# Patient Record
Sex: FEMALE | Race: WHITE | NOT HISPANIC OR LATINO | Employment: FULL TIME | URBAN - METROPOLITAN AREA
[De-identification: names, ages, dates, MRNs, and addresses within clinical notes are randomized per-mention and may not be internally consistent; named-entity substitution may affect disease eponyms.]

---

## 2017-11-04 ENCOUNTER — TELEPHONE (OUTPATIENT)
Dept: MEDICAL GROUP | Facility: MEDICAL CENTER | Age: 47
End: 2017-11-04

## 2017-11-06 ENCOUNTER — OFFICE VISIT (OUTPATIENT)
Dept: MEDICAL GROUP | Facility: MEDICAL CENTER | Age: 47
End: 2017-11-06
Payer: COMMERCIAL

## 2017-11-06 VITALS
BODY MASS INDEX: 27.94 KG/M2 | TEMPERATURE: 97.7 F | WEIGHT: 148 LBS | SYSTOLIC BLOOD PRESSURE: 122 MMHG | HEIGHT: 61 IN | HEART RATE: 97 BPM | OXYGEN SATURATION: 59 % | DIASTOLIC BLOOD PRESSURE: 74 MMHG

## 2017-11-06 DIAGNOSIS — Z87.898 HISTORY OF VERTIGO: ICD-10-CM

## 2017-11-06 DIAGNOSIS — Z12.31 ENCOUNTER FOR SCREENING MAMMOGRAM FOR BREAST CANCER: ICD-10-CM

## 2017-11-06 DIAGNOSIS — Z00.00 PREVENTATIVE HEALTH CARE: Chronic | ICD-10-CM

## 2017-11-06 DIAGNOSIS — Z90.710 S/P TAH (TOTAL ABDOMINAL HYSTERECTOMY): Chronic | ICD-10-CM

## 2017-11-06 DIAGNOSIS — L65.9 HAIR THINNING: ICD-10-CM

## 2017-11-06 DIAGNOSIS — B35.9 TINEA: ICD-10-CM

## 2017-11-06 PROCEDURE — 99396 PREV VISIT EST AGE 40-64: CPT | Performed by: INTERNAL MEDICINE

## 2017-11-06 RX ORDER — CLOTRIMAZOLE AND BETAMETHASONE DIPROPIONATE 10; .64 MG/G; MG/G
1 CREAM TOPICAL 2 TIMES DAILY
Qty: 45 G | Refills: 0 | Status: SHIPPED | OUTPATIENT
Start: 2017-11-06 | End: 2022-04-29

## 2017-11-06 ASSESSMENT — PATIENT HEALTH QUESTIONNAIRE - PHQ9: CLINICAL INTERPRETATION OF PHQ2 SCORE: 0

## 2017-11-06 ASSESSMENT — ENCOUNTER SYMPTOMS
WEIGHT LOSS: 0
BACK PAIN: 0
HEADACHES: 0
PALPITATIONS: 0
SHORTNESS OF BREATH: 0
DOUBLE VISION: 0
DIZZINESS: 0
INSOMNIA: 0
DEPRESSION: 0
COUGH: 0
HEARTBURN: 0
BLURRED VISION: 0

## 2017-11-06 NOTE — PROGRESS NOTES
Subjective:      Yadira Suarez is a 47 y.o. 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 and goes out of state to travel for 3-6 months, exercising 3-4 times per week 1.5 hours at a time, improved diet and limits sweets and candies, coffee 1 per day, no soda, tea on occasion  FH sisters 3 ,mother in [place], little brother in [place], one son, father   Did get flu shot and tetanus in [place]  Medications, allergies, medical history, surgical history, social history, family history reviewed and updated      Current Outpatient Prescriptions   Medication Sig Dispense Refill   • vitamin D, Ergocalciferol, (DRISDOL) 26989 UNIT CAPS capsule Take 50,000 Units by mouth every Monday.       • Multiple Vitamin (MULTIVITAMIN PO) Take 1 Tab by mouth every day.     • Calcium Carbonate-Vitamin D (CALCIUM 600+D HIGH POTENCY PO) Take 2 Tabs by mouth every day. 2 tabs in am  1 tab in pm       No current facility-administered medications for this visit.      Patient Active Problem List   Diagnosis   • S/P ERICA (total abdominal hysterectomy)   • History of vertigo   • Microscopic hematuria   • History of hypertension   • S/P laparoscopic cholecystectomy   • Preventative health care   • Dyslipidemia             Patient Care Team:  Jak Lacey M.D. as PCP - General    Depression Screening    Little interest or pleasure in doing things?  0 - not at all  Feeling down, depressed , or hopeless? 0 - not at all  Patient Health Questionnaire Score: 0            Health Maintenance Summary                IMM DTaP/Tdap/Td Vaccine Overdue 1990     PAP SMEAR Overdue 1992     MAMMOGRAM Overdue 2015      Done 2014 MA-SCREENING MAMMOGRAM W/ CAD      "Patient has more history with this topic...    IMM INFLUENZA Overdue 9/1/2017      Done 9/30/2014 Imm Admin: Influenza TIV (IM)     Patient has more history with this topic...        Status post ERICA  5/06 hysteroscopy and diagnostic laparoscopy  11/06 laparoscopic hysterectomy  1/11 BSO and lysis of adhesions  On bioidentical hormones estrogen, progesterone and testosterone cream per   11/13 on combipatch  12/26/14 on estrogen patch per gyn  gyn    Status post cholecystectomy    Preventative health  12/9/14 mammogram  12/27/14 vit d 30 on calcium 1000 mg daily, vit d 600 units daily and 5000 units MWF    Microscopic hematuria  1999 dr nino had negative IVP    History of vertigo    History hypertension  2/11 chol 184,trig 57,hdl 61,ldl 112  4/21/11 lisinopril 10 mg  7/11 off lisinopril    Dyslipidemia  12/27/14 chol 197,trig 95,hdl 65,ldl 113                Patient Care Team:  Jak Lacey M.D. as PCP - General    Review of Systems   Constitutional: Negative for weight loss.   Eyes: Negative for blurred vision and double vision.   Respiratory: Negative for cough and shortness of breath.    Cardiovascular: Negative for chest pain and palpitations.   Gastrointestinal: Negative for heartburn.   Genitourinary: Negative for frequency.   Musculoskeletal: Negative for back pain.   Neurological: Negative for dizziness and headaches.   Psychiatric/Behavioral: Negative for depression. The patient does not have insomnia.      Hair thinning no bald patches     Objective:     /74   Pulse 97   Temp 36.5 °C (97.7 °F)   Ht 1.549 m (5' 1\")   Wt 67.1 kg (148 lb)   SpO2 (!) 59%   BMI 27.96 kg/m²      Physical Exam   Constitutional: She appears well-developed and well-nourished. No distress.   HENT:   Head: Normocephalic and atraumatic.   Mouth/Throat: Oropharynx is clear and moist.   Eyes: Conjunctivae are normal. Right eye exhibits no discharge. Left eye exhibits no discharge.   Neck: Neck " supple. No JVD present.   Cardiovascular: Normal rate and regular rhythm.    Pulmonary/Chest: Effort normal and breath sounds normal. No respiratory distress. She has no wheezes.   Abdominal: She exhibits no distension.   Musculoskeletal: She exhibits no edema.   Neurological: She is alert.   Skin: Skin is warm. She is not diaphoretic.   Psychiatric: She has a normal mood and affect. Her behavior is normal.   Nursing note and vitals reviewed.    Right elbow rash consistent with tinea, area posterior occipital scalp mild tinea             Assessment/Plan:     Assessment  #! Annual exam    #2 History hypertension off lisinopril, has lost weight, working on diet and exercise, no medications    #3 Dyslipidemia most recent lipid panel 12/27/14 chol 197,trig 95,hdl 65,ldl 113    #4 Status post ERICA 11/06 laparoscopic hysterectomy, no HRT     #5 Status post cholecystectomy, asymptomatic    #6 Tinea right elbow and posterior occipital scalp region    #7 Preventative health  12/9/14 mammogram  12/27/14 vit d 30 on calcium 1000 mg daily, vit d 600 units daily and 5000 units MWF    #8 hair thinning    Plan  #! 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

## 2017-11-13 LAB
25(OH)D3+25(OH)D2 SERPL-MCNC: 87.6 NG/ML (ref 30–100)
ALBUMIN SERPL-MCNC: 4.3 G/DL (ref 3.5–5.5)
ALBUMIN/GLOB SERPL: 1.6 {RATIO} (ref 1.2–2.2)
ALP SERPL-CCNC: 120 IU/L (ref 39–117)
ALT SERPL-CCNC: 19 IU/L (ref 0–32)
APPEARANCE UR: CLEAR
AST SERPL-CCNC: 19 IU/L (ref 0–40)
BACTERIA #/AREA URNS HPF: ABNORMAL /[HPF]
BASOPHILS # BLD AUTO: 0 X10E3/UL (ref 0–0.2)
BASOPHILS NFR BLD AUTO: 1 %
BILIRUB SERPL-MCNC: 1.1 MG/DL (ref 0–1.2)
BILIRUB UR QL STRIP: NEGATIVE
BIOTIN SERPL-MCNC: 12.68 NG/ML (ref 0.05–0.83)
BUN SERPL-MCNC: 19 MG/DL (ref 6–24)
BUN/CREAT SERPL: 25 (ref 9–23)
CALCIUM SERPL-MCNC: 10.4 MG/DL (ref 8.7–10.2)
CASTS URNS MICRO: ABNORMAL
CASTS URNS QL MICRO: ABNORMAL
CHLORIDE SERPL-SCNC: 104 MMOL/L (ref 96–106)
CHOLEST SERPL-MCNC: 194 MG/DL (ref 100–199)
CO2 SERPL-SCNC: 23 MMOL/L (ref 18–29)
COLOR UR: YELLOW
COMMENT 011824: ABNORMAL
CREAT SERPL-MCNC: 0.75 MG/DL (ref 0.57–1)
CRYSTALS URNS MICRO: ABNORMAL
EOSINOPHIL # BLD AUTO: 0.1 X10E3/UL (ref 0–0.4)
EOSINOPHIL NFR BLD AUTO: 2 %
EPI CELLS #/AREA URNS HPF: ABNORMAL /HPF
ERYTHROCYTE [DISTWIDTH] IN BLOOD BY AUTOMATED COUNT: 13.4 % (ref 12.3–15.4)
GFR SERPLBLD CREATININE-BSD FMLA CKD-EPI: 111 ML/MIN/1.73
GFR SERPLBLD CREATININE-BSD FMLA CKD-EPI: 96 ML/MIN/1.73
GLOBULIN SER CALC-MCNC: 2.7 G/DL (ref 1.5–4.5)
GLUCOSE SERPL-MCNC: 92 MG/DL (ref 65–99)
GLUCOSE UR QL: NEGATIVE
HBA1C MFR BLD: 5.5 % (ref 4.8–5.6)
HCT VFR BLD AUTO: 41.7 % (ref 34–46.6)
HDLC SERPL-MCNC: 76 MG/DL
HGB BLD-MCNC: 14.2 G/DL (ref 11.1–15.9)
HGB UR QL STRIP: NEGATIVE
IMM GRANULOCYTES # BLD: 0 X10E3/UL (ref 0–0.1)
IMM GRANULOCYTES NFR BLD: 0 %
IMMATURE CELLS  115398: NORMAL
KETONES UR QL STRIP: NEGATIVE
LDLC SERPL CALC-MCNC: 102 MG/DL (ref 0–99)
LEUKOCYTE ESTERASE UR QL STRIP: NEGATIVE
LYMPHOCYTES # BLD AUTO: 2.6 X10E3/UL (ref 0.7–3.1)
LYMPHOCYTES NFR BLD AUTO: 50 %
MCH RBC QN AUTO: 31.8 PG (ref 26.6–33)
MCHC RBC AUTO-ENTMCNC: 34.1 G/DL (ref 31.5–35.7)
MCV RBC AUTO: 94 FL (ref 79–97)
MICRO URNS: NORMAL
MICRO URNS: NORMAL
MONOCYTES # BLD AUTO: 0.3 X10E3/UL (ref 0.1–0.9)
MONOCYTES NFR BLD AUTO: 6 %
MORPHOLOGY BLD-IMP: NORMAL
MUCOUS THREADS URNS QL MICRO: PRESENT
NEUTROPHILS # BLD AUTO: 2.1 X10E3/UL (ref 1.4–7)
NEUTROPHILS NFR BLD AUTO: 41 %
NITRITE UR QL STRIP: NEGATIVE
NRBC BLD AUTO-RTO: NORMAL %
PH UR STRIP: 6.5 [PH] (ref 5–7.5)
PLATELET # BLD AUTO: 197 X10E3/UL (ref 150–379)
POTASSIUM SERPL-SCNC: 4 MMOL/L (ref 3.5–5.2)
PROT SERPL-MCNC: 7 G/DL (ref 6–8.5)
PROT UR QL STRIP: NEGATIVE
RBC # BLD AUTO: 4.46 X10E6/UL (ref 3.77–5.28)
RBC #/AREA URNS HPF: ABNORMAL /HPF
RENAL EPI CELLS #/AREA URNS HPF: ABNORMAL /[HPF]
SODIUM SERPL-SCNC: 143 MMOL/L (ref 134–144)
SP GR UR: 1.02 (ref 1–1.03)
T VAGINALIS URNS QL MICRO: ABNORMAL
TRIGL SERPL-MCNC: 81 MG/DL (ref 0–149)
TSH SERPL DL<=0.005 MIU/L-ACNC: 2.75 UIU/ML (ref 0.45–4.5)
UNIDENT CRYS URNS QL MICRO: ABNORMAL
URINALYSIS REFLEX  377202: NORMAL
URNS CMNT MICRO: ABNORMAL
UROBILINOGEN UR STRIP-MCNC: 0.2 MG/DL (ref 0.2–1)
VIT B12 SERPL-MCNC: >2000 PG/ML (ref 211–946)
VLDLC SERPL CALC-MCNC: 16 MG/DL (ref 5–40)
WBC # BLD AUTO: 5.1 X10E3/UL (ref 3.4–10.8)
WBC #/AREA URNS HPF: ABNORMAL /HPF
YEAST #/AREA URNS HPF: ABNORMAL /[HPF]

## 2017-11-15 ENCOUNTER — TELEPHONE (OUTPATIENT)
Dept: MEDICAL GROUP | Facility: MEDICAL CENTER | Age: 47
End: 2017-11-15

## 2017-11-15 DIAGNOSIS — R31.29 MICROSCOPIC HEMATURIA: ICD-10-CM

## 2017-11-16 NOTE — TELEPHONE ENCOUNTER
Notified with results.  Calcium and alkaline phosphatase minimally elevated, will monitor  Microscopic blood in urine, has had previous workup by urology records, will repeat urinalysis  Decrease vitamin D to every other week

## 2017-11-23 ENCOUNTER — TELEPHONE (OUTPATIENT)
Dept: MEDICAL GROUP | Facility: MEDICAL CENTER | Age: 47
End: 2017-11-23

## 2017-11-23 LAB
APPEARANCE UR: CLEAR
BACTERIA #/AREA URNS HPF: ABNORMAL /[HPF]
BILIRUB UR QL STRIP: NEGATIVE
CASTS URNS MICRO: ABNORMAL
CASTS URNS QL MICRO: PRESENT /LPF
COLOR UR: YELLOW
CRYSTALS URNS MICRO: ABNORMAL
EPI CELLS #/AREA URNS HPF: ABNORMAL /HPF
GLUCOSE UR QL: NEGATIVE
HGB UR QL STRIP: NEGATIVE
KETONES UR QL STRIP: NEGATIVE
LEUKOCYTE ESTERASE UR QL STRIP: NEGATIVE
MICRO URNS: NORMAL
MICRO URNS: NORMAL
MUCOUS THREADS URNS QL MICRO: PRESENT
NITRITE UR QL STRIP: NEGATIVE
PH UR STRIP: 6.5 [PH] (ref 5–7.5)
PROT UR QL STRIP: NEGATIVE
RBC #/AREA URNS HPF: ABNORMAL /HPF
RENAL EPI CELLS #/AREA URNS HPF: ABNORMAL /[HPF]
SP GR UR: 1.03 (ref 1–1.03)
T VAGINALIS URNS QL MICRO: ABNORMAL
UNIDENT CRYS URNS QL MICRO: ABNORMAL
URINALYSIS REFLEX  377202: NORMAL
URNS CMNT MICRO: ABNORMAL
UROBILINOGEN UR STRIP-MCNC: 0.2 MG/DL (ref 0.2–1)
WBC #/AREA URNS HPF: ABNORMAL /HPF
YEAST #/AREA URNS HPF: ABNORMAL /[HPF]

## 2017-11-27 ENCOUNTER — TELEPHONE (OUTPATIENT)
Dept: MEDICAL GROUP | Facility: MEDICAL CENTER | Age: 47
End: 2017-11-27

## 2017-11-27 NOTE — TELEPHONE ENCOUNTER
----- Message from Jak Lacey M.D. sent at 11/23/2017  1:55 PM PST -----  Please notify patient that her repeat urinalysis is negative for blood

## 2018-01-29 ENCOUNTER — TELEPHONE (OUTPATIENT)
Dept: MEDICAL GROUP | Facility: MEDICAL CENTER | Age: 48
End: 2018-01-29

## 2018-01-29 DIAGNOSIS — R20.2 PARESTHESIAS: ICD-10-CM

## 2018-01-29 NOTE — TELEPHONE ENCOUNTER
I will order the MRI, but she should follow-up with a neurologist, I will place that referral    She should wait until the insurance approves the MRI before having the test done

## 2018-02-20 ENCOUNTER — HOSPITAL ENCOUNTER (OUTPATIENT)
Dept: RADIOLOGY | Facility: MEDICAL CENTER | Age: 48
End: 2018-02-20
Attending: INTERNAL MEDICINE
Payer: COMMERCIAL

## 2018-02-20 DIAGNOSIS — R20.2 PARESTHESIAS: ICD-10-CM

## 2018-02-20 PROCEDURE — 70551 MRI BRAIN STEM W/O DYE: CPT

## 2018-03-01 ENCOUNTER — TELEPHONE (OUTPATIENT)
Dept: MEDICAL GROUP | Facility: MEDICAL CENTER | Age: 48
End: 2018-03-01

## 2018-03-01 NOTE — TELEPHONE ENCOUNTER
1. Caller Name: Kamila                      Call Back Number: [phone] (home)       2. Message: Pt called and LM. Wants to cancel 3/5 appt and r/s for 3/12 or 3/26. Neither were available. LM for pt to call central scheduling.    3. Patient approves office to leave a detailed voicemail/MyChart message: no

## 2018-05-22 ENCOUNTER — APPOINTMENT (OUTPATIENT)
Dept: NEUROLOGY | Facility: MEDICAL CENTER | Age: 48
End: 2018-05-22
Payer: COMMERCIAL

## 2018-10-09 PROBLEM — G51.39 HEMIFACIAL SPASM: Status: ACTIVE | Noted: 2018-10-09

## 2019-01-23 PROBLEM — E21.3 HYPERPARATHYROIDISM (HCC): Status: ACTIVE | Noted: 2019-01-23

## 2021-02-02 ENCOUNTER — HOSPITAL ENCOUNTER (OUTPATIENT)
Facility: MEDICAL CENTER | Age: 51
End: 2021-02-02
Attending: NURSE PRACTITIONER
Payer: COMMERCIAL

## 2021-02-02 ENCOUNTER — EH NON-PROVIDER (OUTPATIENT)
Dept: OCCUPATIONAL MEDICINE | Facility: CLINIC | Age: 51
End: 2021-02-02

## 2021-02-02 ENCOUNTER — EMPLOYEE HEALTH (OUTPATIENT)
Dept: OCCUPATIONAL MEDICINE | Facility: CLINIC | Age: 51
End: 2021-02-02

## 2021-02-02 DIAGNOSIS — Z02.1 PRE-EMPLOYMENT HEALTH SCREENING EXAMINATION: ICD-10-CM

## 2021-02-02 DIAGNOSIS — Z02.1 PRE-EMPLOYMENT DRUG SCREENING: Primary | ICD-10-CM

## 2021-02-02 DIAGNOSIS — Z02.1 PRE-EMPLOYMENT DRUG SCREENING: ICD-10-CM

## 2021-02-02 LAB
AMP AMPHETAMINE: NORMAL
BAR BARBITURATES: NORMAL
BZO BENZODIAZEPINES: NORMAL
COC COCAINE: NORMAL
INT CON NEG: NORMAL
INT CON POS: NORMAL
MDMA ECSTASY: NORMAL
MET METHAMPHETAMINES: NORMAL
MTD METHADONE: NORMAL
OPI OPIATES: NORMAL
OXY OXYCODONE: NORMAL
PCP PHENCYCLIDINE: NORMAL
POC URINE DRUG SCREEN OCDRS: NEGATIVE
THC: NORMAL

## 2021-02-02 PROCEDURE — 80305 DRUG TEST PRSMV DIR OPT OBS: CPT | Performed by: NURSE PRACTITIONER

## 2021-02-02 PROCEDURE — 86480 TB TEST CELL IMMUN MEASURE: CPT | Performed by: NURSE PRACTITIONER

## 2021-02-02 PROCEDURE — 8915 PR COMPREHENSIVE PHYSICAL: Performed by: NURSE PRACTITIONER

## 2021-02-02 PROCEDURE — 90715 TDAP VACCINE 7 YRS/> IM: CPT | Performed by: NURSE PRACTITIONER

## 2021-02-02 PROCEDURE — 86787 VARICELLA-ZOSTER ANTIBODY: CPT | Performed by: NURSE PRACTITIONER

## 2021-02-02 PROCEDURE — 94375 RESPIRATORY FLOW VOLUME LOOP: CPT | Performed by: NURSE PRACTITIONER

## 2021-02-02 PROCEDURE — 90746 HEPB VACCINE 3 DOSE ADULT IM: CPT | Performed by: NURSE PRACTITIONER

## 2021-02-03 LAB — VZV IGG SER IA-ACNC: 2.47

## 2021-02-04 LAB
GAMMA INTERFERON BACKGROUND BLD IA-ACNC: 0.05 IU/ML
M TB IFN-G BLD-IMP: NEGATIVE
M TB IFN-G CD4+ BCKGRND COR BLD-ACNC: 0.03 IU/ML
MITOGEN IGNF BCKGRD COR BLD-ACNC: >10 IU/ML
QFT TB2 - NIL TBQ2: 0.02 IU/ML

## 2021-02-08 ENCOUNTER — EH NON-PROVIDER (OUTPATIENT)
Dept: OCCUPATIONAL MEDICINE | Facility: CLINIC | Age: 51
End: 2021-02-08

## 2021-02-08 DIAGNOSIS — Z71.85 IMMUNIZATION COUNSELING: ICD-10-CM

## 2021-03-17 ENCOUNTER — EH NON-PROVIDER (OUTPATIENT)
Dept: OCCUPATIONAL MEDICINE | Facility: CLINIC | Age: 51
End: 2021-03-17

## 2021-03-17 DIAGNOSIS — Z23 NEED FOR HEPATITIS B VACCINATION: Primary | ICD-10-CM

## 2021-03-17 PROCEDURE — 90746 HEPB VACCINE 3 DOSE ADULT IM: CPT | Performed by: NURSE PRACTITIONER

## 2021-05-07 ENCOUNTER — HOSPITAL ENCOUNTER (OUTPATIENT)
Dept: LAB | Facility: MEDICAL CENTER | Age: 51
End: 2021-05-07
Attending: NURSE PRACTITIONER
Payer: COMMERCIAL

## 2021-05-07 LAB
BASOPHILS # BLD AUTO: 0.8 % (ref 0–1.8)
BASOPHILS # BLD: 0.04 K/UL (ref 0–0.12)
EOSINOPHIL # BLD AUTO: 0.09 K/UL (ref 0–0.51)
EOSINOPHIL NFR BLD: 1.9 % (ref 0–6.9)
ERYTHROCYTE [DISTWIDTH] IN BLOOD BY AUTOMATED COUNT: 41.3 FL (ref 35.9–50)
HCT VFR BLD AUTO: 45.8 % (ref 37–47)
HGB BLD-MCNC: 15.3 G/DL (ref 12–16)
IMM GRANULOCYTES # BLD AUTO: 0.02 K/UL (ref 0–0.11)
IMM GRANULOCYTES NFR BLD AUTO: 0.4 % (ref 0–0.9)
LYMPHOCYTES # BLD AUTO: 1.47 K/UL (ref 1–4.8)
LYMPHOCYTES NFR BLD: 30.3 % (ref 22–41)
MCH RBC QN AUTO: 31.4 PG (ref 27–33)
MCHC RBC AUTO-ENTMCNC: 33.4 G/DL (ref 33.6–35)
MCV RBC AUTO: 94 FL (ref 81.4–97.8)
MONOCYTES # BLD AUTO: 0.33 K/UL (ref 0–0.85)
MONOCYTES NFR BLD AUTO: 6.8 % (ref 0–13.4)
NEUTROPHILS # BLD AUTO: 2.9 K/UL (ref 2–7.15)
NEUTROPHILS NFR BLD: 59.8 % (ref 44–72)
NRBC # BLD AUTO: 0 K/UL
NRBC BLD-RTO: 0 /100 WBC
PLATELET # BLD AUTO: 214 K/UL (ref 164–446)
PMV BLD AUTO: 10.3 FL (ref 9–12.9)
RBC # BLD AUTO: 4.87 M/UL (ref 4.2–5.4)
WBC # BLD AUTO: 4.9 K/UL (ref 4.8–10.8)

## 2021-05-07 PROCEDURE — 83735 ASSAY OF MAGNESIUM: CPT

## 2021-05-07 PROCEDURE — 84443 ASSAY THYROID STIM HORMONE: CPT

## 2021-05-07 PROCEDURE — 82330 ASSAY OF CALCIUM: CPT

## 2021-05-07 PROCEDURE — 84439 ASSAY OF FREE THYROXINE: CPT

## 2021-05-07 PROCEDURE — 36415 COLL VENOUS BLD VENIPUNCTURE: CPT

## 2021-05-07 PROCEDURE — 80053 COMPREHEN METABOLIC PANEL: CPT

## 2021-05-07 PROCEDURE — 82306 VITAMIN D 25 HYDROXY: CPT

## 2021-05-07 PROCEDURE — 85025 COMPLETE CBC W/AUTO DIFF WBC: CPT

## 2021-05-07 PROCEDURE — 80061 LIPID PANEL: CPT

## 2021-05-07 PROCEDURE — 84100 ASSAY OF PHOSPHORUS: CPT

## 2021-05-07 PROCEDURE — 83970 ASSAY OF PARATHORMONE: CPT

## 2021-05-08 LAB
ALBUMIN SERPL BCP-MCNC: 4.2 G/DL (ref 3.2–4.9)
ALBUMIN/GLOB SERPL: 1.4 G/DL
ALP SERPL-CCNC: 96 U/L (ref 30–99)
ALT SERPL-CCNC: 15 U/L (ref 2–50)
ANION GAP SERPL CALC-SCNC: 8 MMOL/L (ref 7–16)
AST SERPL-CCNC: 16 U/L (ref 12–45)
BILIRUB SERPL-MCNC: 1 MG/DL (ref 0.1–1.5)
BUN SERPL-MCNC: 19 MG/DL (ref 8–22)
CA-I SERPL-SCNC: 1.2 MMOL/L (ref 1.1–1.3)
CALCIUM SERPL-MCNC: 9.7 MG/DL (ref 8.5–10.5)
CHLORIDE SERPL-SCNC: 108 MMOL/L (ref 96–112)
CHOLEST SERPL-MCNC: 212 MG/DL (ref 100–199)
CO2 SERPL-SCNC: 26 MMOL/L (ref 20–33)
CREAT SERPL-MCNC: 0.85 MG/DL (ref 0.5–1.4)
FASTING STATUS PATIENT QL REPORTED: NORMAL
GLOBULIN SER CALC-MCNC: 3.1 G/DL (ref 1.9–3.5)
GLUCOSE SERPL-MCNC: 95 MG/DL (ref 65–99)
HDLC SERPL-MCNC: 66 MG/DL
LDLC SERPL CALC-MCNC: 129 MG/DL
MAGNESIUM SERPL-MCNC: 2.3 MG/DL (ref 1.5–2.5)
PHOSPHATE SERPL-MCNC: 3.7 MG/DL (ref 2.5–4.5)
POTASSIUM SERPL-SCNC: 4.5 MMOL/L (ref 3.6–5.5)
PROT SERPL-MCNC: 7.3 G/DL (ref 6–8.2)
PTH-INTACT SERPL-MCNC: 33.7 PG/ML (ref 14–72)
SODIUM SERPL-SCNC: 142 MMOL/L (ref 135–145)
T4 FREE SERPL-MCNC: 1.07 NG/DL (ref 0.93–1.7)
TRIGL SERPL-MCNC: 83 MG/DL (ref 0–149)
TSH SERPL DL<=0.005 MIU/L-ACNC: 2.09 UIU/ML (ref 0.38–5.33)

## 2021-05-10 LAB — 25(OH)D3 SERPL-MCNC: 30 NG/ML (ref 30–80)

## 2021-08-19 ENCOUNTER — EH NON-PROVIDER (OUTPATIENT)
Dept: OCCUPATIONAL MEDICINE | Facility: CLINIC | Age: 51
End: 2021-08-19

## 2021-08-19 DIAGNOSIS — Z02.89 ENCOUNTER FOR OCCUPATIONAL HEALTH ASSESSMENT: Primary | ICD-10-CM

## 2021-08-19 PROCEDURE — 90746 HEPB VACCINE 3 DOSE ADULT IM: CPT | Performed by: NURSE PRACTITIONER

## 2021-09-01 ENCOUNTER — HOSPITAL ENCOUNTER (EMERGENCY)
Facility: MEDICAL CENTER | Age: 51
End: 2021-09-01
Attending: EMERGENCY MEDICINE
Payer: COMMERCIAL

## 2021-09-01 VITALS
DIASTOLIC BLOOD PRESSURE: 80 MMHG | HEART RATE: 67 BPM | OXYGEN SATURATION: 97 % | RESPIRATION RATE: 20 BRPM | HEIGHT: 61 IN | SYSTOLIC BLOOD PRESSURE: 133 MMHG | WEIGHT: 166 LBS | BODY MASS INDEX: 31.34 KG/M2 | TEMPERATURE: 97.8 F

## 2021-09-01 DIAGNOSIS — R42 DIZZINESS: ICD-10-CM

## 2021-09-01 DIAGNOSIS — I10 HYPERTENSION, UNSPECIFIED TYPE: ICD-10-CM

## 2021-09-01 DIAGNOSIS — R11.0 NAUSEA: ICD-10-CM

## 2021-09-01 LAB
ALBUMIN SERPL BCP-MCNC: 4.2 G/DL (ref 3.2–4.9)
ALBUMIN/GLOB SERPL: 1.4 G/DL
ALP SERPL-CCNC: 112 U/L (ref 30–99)
ALT SERPL-CCNC: 16 U/L (ref 2–50)
ANION GAP SERPL CALC-SCNC: 16 MMOL/L (ref 7–16)
APPEARANCE UR: CLEAR
AST SERPL-CCNC: 16 U/L (ref 12–45)
BACTERIA #/AREA URNS HPF: ABNORMAL /HPF
BASOPHILS # BLD AUTO: 0.7 % (ref 0–1.8)
BASOPHILS # BLD: 0.05 K/UL (ref 0–0.12)
BILIRUB SERPL-MCNC: 1 MG/DL (ref 0.1–1.5)
BILIRUB UR QL STRIP.AUTO: NEGATIVE
BUN SERPL-MCNC: 20 MG/DL (ref 8–22)
CA-I SERPL-SCNC: 1.17 MMOL/L (ref 1.1–1.3)
CALCIUM SERPL-MCNC: 9.9 MG/DL (ref 8.4–10.2)
CHLORIDE SERPL-SCNC: 106 MMOL/L (ref 96–112)
CO2 SERPL-SCNC: 24 MMOL/L (ref 20–33)
COLOR UR: YELLOW
CREAT SERPL-MCNC: 0.88 MG/DL (ref 0.5–1.4)
EKG IMPRESSION: NORMAL
EOSINOPHIL # BLD AUTO: 0.16 K/UL (ref 0–0.51)
EOSINOPHIL NFR BLD: 2.2 % (ref 0–6.9)
EPI CELLS #/AREA URNS HPF: ABNORMAL /HPF
ERYTHROCYTE [DISTWIDTH] IN BLOOD BY AUTOMATED COUNT: 41.4 FL (ref 35.9–50)
GLOBULIN SER CALC-MCNC: 3.1 G/DL (ref 1.9–3.5)
GLUCOSE SERPL-MCNC: 111 MG/DL (ref 65–99)
GLUCOSE UR STRIP.AUTO-MCNC: NEGATIVE MG/DL
HCT VFR BLD AUTO: 46 % (ref 37–47)
HGB BLD-MCNC: 15.9 G/DL (ref 12–16)
IMM GRANULOCYTES # BLD AUTO: 0.03 K/UL (ref 0–0.11)
IMM GRANULOCYTES NFR BLD AUTO: 0.4 % (ref 0–0.9)
KETONES UR STRIP.AUTO-MCNC: NEGATIVE MG/DL
LEUKOCYTE ESTERASE UR QL STRIP.AUTO: NEGATIVE
LYMPHOCYTES # BLD AUTO: 2.51 K/UL (ref 1–4.8)
LYMPHOCYTES NFR BLD: 34.5 % (ref 22–41)
MAGNESIUM SERPL-MCNC: 2.4 MG/DL (ref 1.5–2.5)
MCH RBC QN AUTO: 31.7 PG (ref 27–33)
MCHC RBC AUTO-ENTMCNC: 34.6 G/DL (ref 33.6–35)
MCV RBC AUTO: 91.8 FL (ref 81.4–97.8)
MICRO URNS: ABNORMAL
MONOCYTES # BLD AUTO: 0.51 K/UL (ref 0–0.85)
MONOCYTES NFR BLD AUTO: 7 % (ref 0–13.4)
MUCOUS THREADS #/AREA URNS HPF: ABNORMAL /HPF
NEUTROPHILS # BLD AUTO: 4.01 K/UL (ref 2–7.15)
NEUTROPHILS NFR BLD: 55.2 % (ref 44–72)
NITRITE UR QL STRIP.AUTO: NEGATIVE
NRBC # BLD AUTO: 0 K/UL
NRBC BLD-RTO: 0 /100 WBC
PH UR STRIP.AUTO: 6.5 [PH] (ref 5–8)
PHOSPHATE SERPL-MCNC: 3.9 MG/DL (ref 2.5–4.5)
PLATELET # BLD AUTO: 244 K/UL (ref 164–446)
PMV BLD AUTO: 9.6 FL (ref 9–12.9)
POTASSIUM SERPL-SCNC: 3.8 MMOL/L (ref 3.6–5.5)
PROT SERPL-MCNC: 7.3 G/DL (ref 6–8.2)
PROT UR QL STRIP: NEGATIVE MG/DL
RBC # BLD AUTO: 5.01 M/UL (ref 4.2–5.4)
RBC # URNS HPF: ABNORMAL /HPF
RBC UR QL AUTO: ABNORMAL
SODIUM SERPL-SCNC: 146 MMOL/L (ref 135–145)
SP GR UR STRIP.AUTO: 1.02
TROPONIN T SERPL-MCNC: <6 NG/L (ref 6–19)
WBC # BLD AUTO: 7.3 K/UL (ref 4.8–10.8)
WBC #/AREA URNS HPF: ABNORMAL /HPF

## 2021-09-01 PROCEDURE — 700105 HCHG RX REV CODE 258: Performed by: EMERGENCY MEDICINE

## 2021-09-01 PROCEDURE — 700102 HCHG RX REV CODE 250 W/ 637 OVERRIDE(OP): Performed by: EMERGENCY MEDICINE

## 2021-09-01 PROCEDURE — 96374 THER/PROPH/DIAG INJ IV PUSH: CPT

## 2021-09-01 PROCEDURE — 93005 ELECTROCARDIOGRAM TRACING: CPT

## 2021-09-01 PROCEDURE — 84100 ASSAY OF PHOSPHORUS: CPT

## 2021-09-01 PROCEDURE — 700111 HCHG RX REV CODE 636 W/ 250 OVERRIDE (IP): Performed by: EMERGENCY MEDICINE

## 2021-09-01 PROCEDURE — 80053 COMPREHEN METABOLIC PANEL: CPT

## 2021-09-01 PROCEDURE — U0005 INFEC AGEN DETEC AMPLI PROBE: HCPCS

## 2021-09-01 PROCEDURE — 81001 URINALYSIS AUTO W/SCOPE: CPT

## 2021-09-01 PROCEDURE — 99285 EMERGENCY DEPT VISIT HI MDM: CPT

## 2021-09-01 PROCEDURE — 82330 ASSAY OF CALCIUM: CPT

## 2021-09-01 PROCEDURE — 84484 ASSAY OF TROPONIN QUANT: CPT

## 2021-09-01 PROCEDURE — 93005 ELECTROCARDIOGRAM TRACING: CPT | Performed by: EMERGENCY MEDICINE

## 2021-09-01 PROCEDURE — U0003 INFECTIOUS AGENT DETECTION BY NUCLEIC ACID (DNA OR RNA); SEVERE ACUTE RESPIRATORY SYNDROME CORONAVIRUS 2 (SARS-COV-2) (CORONAVIRUS DISEASE [COVID-19]), AMPLIFIED PROBE TECHNIQUE, MAKING USE OF HIGH THROUGHPUT TECHNOLOGIES AS DESCRIBED BY CMS-2020-01-R: HCPCS

## 2021-09-01 PROCEDURE — 85025 COMPLETE CBC W/AUTO DIFF WBC: CPT

## 2021-09-01 PROCEDURE — A9270 NON-COVERED ITEM OR SERVICE: HCPCS | Performed by: EMERGENCY MEDICINE

## 2021-09-01 PROCEDURE — 83735 ASSAY OF MAGNESIUM: CPT

## 2021-09-01 RX ORDER — ONDANSETRON 2 MG/ML
4 INJECTION INTRAMUSCULAR; INTRAVENOUS ONCE
Status: COMPLETED | OUTPATIENT
Start: 2021-09-01 | End: 2021-09-01

## 2021-09-01 RX ORDER — SODIUM CHLORIDE 9 MG/ML
1000 INJECTION, SOLUTION INTRAVENOUS ONCE
Status: COMPLETED | OUTPATIENT
Start: 2021-09-01 | End: 2021-09-01

## 2021-09-01 RX ORDER — ONDANSETRON 4 MG/1
4 TABLET, ORALLY DISINTEGRATING ORAL EVERY 8 HOURS PRN
Qty: 10 TABLET | Refills: 1 | Status: SHIPPED | OUTPATIENT
Start: 2021-09-01 | End: 2022-04-29

## 2021-09-01 RX ORDER — MECLIZINE HYDROCHLORIDE 25 MG/1
25 TABLET ORAL ONCE
Status: COMPLETED | OUTPATIENT
Start: 2021-09-01 | End: 2021-09-01

## 2021-09-01 RX ADMIN — SODIUM CHLORIDE 1000 ML: 9 INJECTION, SOLUTION INTRAVENOUS at 19:13

## 2021-09-01 RX ADMIN — MECLIZINE HYDROCHLORIDE 25 MG: 25 TABLET ORAL at 19:13

## 2021-09-01 RX ADMIN — ONDANSETRON 4 MG: 2 INJECTION INTRAMUSCULAR; INTRAVENOUS at 19:14

## 2021-09-01 ASSESSMENT — FIBROSIS 4 INDEX: FIB4 SCORE: 0.97

## 2021-09-02 LAB
SARS-COV-2 RNA RESP QL NAA+PROBE: NOTDETECTED
SPECIMEN SOURCE: NORMAL

## 2021-09-02 NOTE — ED PROVIDER NOTES
"ED Provider Note    CHIEF COMPLAINT  Chief Complaint   Patient presents with   • Lightheadedness   • Nausea       HPI  Yadira Suarez is a 51 y.o. female who presents with complaint of dizziness, nausea.  She has a feeling of \"buzzed\" with unsteadiness in her head, denies confusion however.  She has been trying to work but with physical symptoms, has checked into be evaluated.  Patient found to be hypertensive, she states no history of this.  Patient has history of hyperparathyroid tumor removed in 2018.  She has nausea associated.  No diarrhea, no fever.  She denies trauma.  No chest pain, no syncope     REVIEW OF SYSTEMS    Constitutional: Dizziness upon standing  Respiratory: No shortness of breath  Cardiac: Chest pain or syncope  Gastrointestinal: Nausea  Musculoskeletal: No acute neck or back pain  Neurologic: Mild headache  Endocrine: Parathyroid problems       All other systems are negative.       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   • Other specified symptom associated with female genital organs     hysterectomy   • Pain    • Unspecified disorder of thyroid        FAMILY HISTORY  Family History   Problem Relation Age of Onset   • Diabetes Mother         diet controlled   • Hypertension Father    • Heart Disease Father         MI age 61   • Cancer Father         prostate ca   • Cancer Sister         ovarian lesion   • Heart Disease Brother         blood clot heart       SOCIAL HISTORY  Social History     Socioeconomic History   • Marital status:      Spouse name: Not on file   • Number of children: Not on file   • Years of education: Not on file   • Highest education level: Not on file   Occupational History   • Occupation: ER      Employer: [employer]   Tobacco Use   • Smoking status: Never Smoker   • Smokeless tobacco: Never Used   Vaping Use   • Vaping Use: Never used   Substance and Sexual " Activity   • Alcohol use: Yes     Comment: glass of wine every six months   • Drug use: No   • Sexual activity: Yes     Partners: Male     Birth control/protection: Surgical   Other Topics Concern   • Not on file   Social History Narrative   • Not on file     Social Determinants of Health     Financial Resource Strain:    • Difficulty of Paying Living Expenses:    Food Insecurity:    • Worried About Running Out of Food in the Last Year:    • Ran Out of Food in the Last Year:    Transportation Needs:    • Lack of Transportation (Medical):    • Lack of Transportation (Non-Medical):    Physical Activity:    • Days of Exercise per Week:    • Minutes of Exercise per Session:    Stress:    • Feeling of Stress :    Social Connections:    • Frequency of Communication with Friends and Family:    • Frequency of Social Gatherings with Friends and Family:    • Attends [employer] Services:    • Active Member of Clubs or Organizations:    • Attends Club or Organization Meetings:    • Marital Status:    Intimate Partner Violence:    • Fear of Current or Ex-Partner:    • Emotionally Abused:    • Physically Abused:    • Sexually Abused:        SURGICAL HISTORY  Past Surgical History:   Procedure Laterality Date   • ABDOMINOPLASTY  12/11/2013    Performed by Sherrie Knott M.D. at SURGERY [facility] ORS   • FAITH BY LAPAROSCOPY  9/27/2012    Performed by Jana Henriquez M.D. at SURGERY [facility] ORS   • PELVISCOPY  9/16/2010    Performed by LALY FALLON at SURGERY [facility]   • ABDOMINAL HYSTERECTOMY TOTAL  2006    fibroid removal        CURRENT MEDICATIONS  Home Medications     Reviewed by Tonia Cárdenas R.N. (Registered Nurse) on 09/01/21 at 1831  Med List Status: Not Addressed   Medication Last Dose Status   BIOTIN PO  Active   Calcium Carbonate-Vitamin D (CALCIUM 600+D HIGH POTENCY PO)  Active   clotrimazole-betamethasone (LOTRISONE) 1-0.05 % Cream  Active   Cyanocobalamin (VITAMIN B 12 PO)  Active  "  Multiple Vitamin (MULTIVITAMIN PO)  Active   vitamin D, Ergocalciferol, (DRISDOL) 89523 UNIT CAPS capsule  Active                ALLERGIES  Allergies   Allergen Reactions   • Pork Allergy      itching       PHYSICAL EXAM  VITAL SIGNS: /93   Pulse 73   Temp 36.6 °C (97.8 °F) (Temporal)   Resp 17   Ht 1.549 m (5' 1\")   Wt 75.3 kg (166 lb)   SpO2 97%   BMI 31.37 kg/m²   Constitutional:  Non-toxic appearance.   HENT: No facial swelling  Eyes: Anicteric, no conjunctivitis.     Cardiovascular: Normal heart rate, Normal rhythm, no murmur  Pulmonary:  No wheezing, No rales.   Gastrointestinal: Soft, No tenderness, No distention  Skin: Warm, Dry, No cyanosis.  No asymmetric edema  Neurologic: Speech is clear, follows commands, facial expression is symmetrical.  Strength intact.  Psychiatric: Affect normal,  Mood normal.  Patient is calm and cooperative  Musculoskeletal: Neck is nontender    EKG/Labs  Results for orders placed or performed during the hospital encounter of 09/01/21   CBC WITH DIFFERENTIAL   Result Value Ref Range    WBC 7.3 4.8 - 10.8 K/uL    RBC 5.01 4.20 - 5.40 M/uL    Hemoglobin 15.9 12.0 - 16.0 g/dL    Hematocrit 46.0 37.0 - 47.0 %    MCV 91.8 81.4 - 97.8 fL    MCH 31.7 27.0 - 33.0 pg    MCHC 34.6 33.6 - 35.0 g/dL    RDW 41.4 35.9 - 50.0 fL    Platelet Count 244 164 - 446 K/uL    MPV 9.6 9.0 - 12.9 fL    Neutrophils-Polys 55.20 44.00 - 72.00 %    Lymphocytes 34.50 22.00 - 41.00 %    Monocytes 7.00 0.00 - 13.40 %    Eosinophils 2.20 0.00 - 6.90 %    Basophils 0.70 0.00 - 1.80 %    Immature Granulocytes 0.40 0.00 - 0.90 %    Nucleated RBC 0.00 /100 WBC    Neutrophils (Absolute) 4.01 2.00 - 7.15 K/uL    Lymphs (Absolute) 2.51 1.00 - 4.80 K/uL    Monos (Absolute) 0.51 0.00 - 0.85 K/uL    Eos (Absolute) 0.16 0.00 - 0.51 K/uL    Baso (Absolute) 0.05 0.00 - 0.12 K/uL    Immature Granulocytes (abs) 0.03 0.00 - 0.11 K/uL    NRBC (Absolute) 0.00 K/uL   COMP METABOLIC PANEL   Result Value Ref Range    " Sodium 146 (H) 135 - 145 mmol/L    Potassium 3.8 3.6 - 5.5 mmol/L    Chloride 106 96 - 112 mmol/L    Co2 24 20 - 33 mmol/L    Anion Gap 16.0 7.0 - 16.0    Glucose 111 (H) 65 - 99 mg/dL    Bun 20 8 - 22 mg/dL    Creatinine 0.88 0.50 - 1.40 mg/dL    Calcium 9.9 8.4 - 10.2 mg/dL    AST(SGOT) 16 12 - 45 U/L    ALT(SGPT) 16 2 - 50 U/L    Alkaline Phosphatase 112 (H) 30 - 99 U/L    Total Bilirubin 1.0 0.1 - 1.5 mg/dL    Albumin 4.2 3.2 - 4.9 g/dL    Total Protein 7.3 6.0 - 8.2 g/dL    Globulin 3.1 1.9 - 3.5 g/dL    A-G Ratio 1.4 g/dL   TROPONIN   Result Value Ref Range    Troponin T <6 6 - 19 ng/L   URINALYSIS (UA)    Specimen: Urine   Result Value Ref Range    Color Yellow     Character Clear     Specific Gravity 1.020 <1.035    Ph 6.5 5.0 - 8.0    Glucose Negative Negative mg/dL    Ketones Negative Negative mg/dL    Protein Negative Negative mg/dL    Bilirubin Negative Negative    Nitrite Negative Negative    Leukocyte Esterase Negative Negative    Occult Blood Trace (A) Negative    Micro Urine Req Microscopic    PHOSPHORUS   Result Value Ref Range    Phosphorus 3.9 2.5 - 4.5 mg/dL   MAGNESIUM   Result Value Ref Range    Magnesium 2.4 1.5 - 2.5 mg/dL   IONIZED CALCIUM   Result Value Ref Range    Ionized Calcium 1.17 1.10 - 1.30 mmol/L   ESTIMATED GFR   Result Value Ref Range    GFR If African American >60 >60 mL/min/1.73 m 2    GFR If Non African American >60 >60 mL/min/1.73 m 2   URINE MICROSCOPIC (W/UA)   Result Value Ref Range    WBC Rare /hpf    RBC 2-5 (A) /hpf    Bacteria Rare (A) None /hpf    Epithelial Cells Few Few /hpf    Mucous Threads Few /hpf   SARS-CoV-2 PCR (24 hour In-House): Collect NP swab in VTM    Specimen: Respirate   Result Value Ref Range    SARS-CoV-2 Source Nasal Swab    EKG   Result Value Ref Range    Report       [facility] Emergency Dept.    Test Date:  2021-09-01  Pt Name:    ORA LORENZ               Department: EDS  MRN:        [id number]                       [facility]  Gender:     Female                       Technician: IRA  :        [id number]                   Requested By:ER TRIAGE PROTOCOL  Order #:    [id number]                    Reading MD: SIDNEY CONNER MD    Measurements  Intervals                                Axis  Rate:       79                           P:          20  TX:         141                          QRS:        20  QRSD:       99                           T:          37  QT:         410  QTc:        471    Interpretive Statements  Pacemaker spikes or artifacts  Sinus rhythm  Borderline T wave abnormalities  Compared to ECG 2012 15:20:29  T-wave abnormality now present  Sinus bradycardia no longer present  No ischemia  Improved rate, otherwise unchanged from previous  Electronically Signed On 2021 20:19:52 PDT by  SIDNEY CONNER MD             COURSE & MEDICAL DECISION MAKING  Pertinent Labs & Imaging studies reviewed. (See chart for details)  Patient with normal blood counts, normal liver and renal function.  Her calcium is normal.  She states with history of low magnesium, this was also checked, normal.  Blood pressure has gradually improved during her stay, was down to 133/80 prior to discharge.  She is advised to recheck blood pressure follow-up the primary care doctor for recheck.  Etiology of her dizziness, nausea is unknown.  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  COVID-19 test is sent, pending.  She will check the results tomorrow and act accordingly.  Patient treated with Antivert emergency department, Zofran for nausea with some improvement.  She was given IV hydration with some symptomatic relief.    FINAL IMPRESSION     1. Nausea     2. Dizziness     3. Hypertension, unspecified type                     Electronically signed by: Sidney Conner M.D., 2021 8:18 PM

## 2021-09-02 NOTE — ED TRIAGE NOTES
"Pt presents from work on unit for dizziness that began this morning around 0330. Pt feels \"buzzed.\" A&Ox4. High /110 prior to signing in as pt. No hx of htn. Goes on to report posterior left lower rib pain.    hx hyperparathyroid tumor removed in 2018.    Has this patient been vaccinated for COVID yes  If not, would they like to be vaccinated while in the ER if eligible?  n/a  Would the patient like to speak with the ERP about the possibility of receiving the COVID vaccine today before making a decision? n/a     "

## 2021-09-02 NOTE — DISCHARGE INSTRUCTIONS
Return if worse or for any concerns    Check your COVID-19 test results tomorrow, then act accordingly    See your primary doctor soon as possible for blood pressure recheck

## 2021-09-28 ENCOUNTER — IMMUNIZATION (OUTPATIENT)
Dept: OCCUPATIONAL MEDICINE | Facility: CLINIC | Age: 51
End: 2021-09-28

## 2021-09-28 DIAGNOSIS — Z23 NEED FOR VACCINATION: Primary | ICD-10-CM

## 2021-09-28 DIAGNOSIS — Z23 ENCOUNTER FOR VACCINATION: Primary | ICD-10-CM

## 2021-09-28 PROCEDURE — 91300 PFIZER SARS-COV-2 VACCINE: CPT | Performed by: INTERNAL MEDICINE

## 2021-09-28 PROCEDURE — 0003A PFIZER SARS-COV-2 VACCINE: CPT | Performed by: INTERNAL MEDICINE

## 2021-09-28 PROCEDURE — 90686 IIV4 VACC NO PRSV 0.5 ML IM: CPT | Performed by: NURSE PRACTITIONER

## 2022-01-21 ENCOUNTER — HOSPITAL ENCOUNTER (OUTPATIENT)
Dept: RADIOLOGY | Facility: MEDICAL CENTER | Age: 52
End: 2022-01-21
Attending: NURSE PRACTITIONER
Payer: COMMERCIAL

## 2022-01-21 ENCOUNTER — HOSPITAL ENCOUNTER (OUTPATIENT)
Dept: LAB | Facility: MEDICAL CENTER | Age: 52
End: 2022-01-21
Attending: NURSE PRACTITIONER
Payer: COMMERCIAL

## 2022-01-21 DIAGNOSIS — Z12.31 VISIT FOR SCREENING MAMMOGRAM: ICD-10-CM

## 2022-01-21 LAB
25(OH)D3 SERPL-MCNC: 25 NG/ML (ref 30–100)
ALBUMIN SERPL BCP-MCNC: 3.9 G/DL (ref 3.2–4.9)
ALBUMIN/GLOB SERPL: 1.4 G/DL
ALP SERPL-CCNC: 101 U/L (ref 30–99)
ALT SERPL-CCNC: 17 U/L (ref 2–50)
ANION GAP SERPL CALC-SCNC: 11 MMOL/L (ref 7–16)
AST SERPL-CCNC: 18 U/L (ref 12–45)
BASOPHILS # BLD AUTO: 0.7 % (ref 0–1.8)
BASOPHILS # BLD: 0.04 K/UL (ref 0–0.12)
BILIRUB SERPL-MCNC: 0.5 MG/DL (ref 0.1–1.5)
BUN SERPL-MCNC: 17 MG/DL (ref 8–22)
CA-I SERPL-SCNC: 1.2 MMOL/L (ref 1.1–1.3)
CALCIUM SERPL-MCNC: 9.4 MG/DL (ref 8.5–10.5)
CHLORIDE SERPL-SCNC: 108 MMOL/L (ref 96–112)
CO2 SERPL-SCNC: 23 MMOL/L (ref 20–33)
CREAT SERPL-MCNC: 0.86 MG/DL (ref 0.5–1.4)
EOSINOPHIL # BLD AUTO: 0.17 K/UL (ref 0–0.51)
EOSINOPHIL NFR BLD: 2.9 % (ref 0–6.9)
ERYTHROCYTE [DISTWIDTH] IN BLOOD BY AUTOMATED COUNT: 43.1 FL (ref 35.9–50)
GLOBULIN SER CALC-MCNC: 2.8 G/DL (ref 1.9–3.5)
GLUCOSE SERPL-MCNC: 115 MG/DL (ref 65–99)
HCT VFR BLD AUTO: 39.4 % (ref 37–47)
HGB BLD-MCNC: 13.5 G/DL (ref 12–16)
IMM GRANULOCYTES # BLD AUTO: 0.02 K/UL (ref 0–0.11)
IMM GRANULOCYTES NFR BLD AUTO: 0.3 % (ref 0–0.9)
LYMPHOCYTES # BLD AUTO: 1.9 K/UL (ref 1–4.8)
LYMPHOCYTES NFR BLD: 32.5 % (ref 22–41)
MAGNESIUM SERPL-MCNC: 2.3 MG/DL (ref 1.5–2.5)
MCH RBC QN AUTO: 32.3 PG (ref 27–33)
MCHC RBC AUTO-ENTMCNC: 34.3 G/DL (ref 33.6–35)
MCV RBC AUTO: 94.3 FL (ref 81.4–97.8)
MONOCYTES # BLD AUTO: 0.44 K/UL (ref 0–0.85)
MONOCYTES NFR BLD AUTO: 7.5 % (ref 0–13.4)
NEUTROPHILS # BLD AUTO: 3.27 K/UL (ref 2–7.15)
NEUTROPHILS NFR BLD: 56.1 % (ref 44–72)
NRBC # BLD AUTO: 0 K/UL
NRBC BLD-RTO: 0 /100 WBC
PHOSPHATE SERPL-MCNC: 4.5 MG/DL (ref 2.5–4.5)
PLATELET # BLD AUTO: 202 K/UL (ref 164–446)
PMV BLD AUTO: 10.5 FL (ref 9–12.9)
POTASSIUM SERPL-SCNC: 4.4 MMOL/L (ref 3.6–5.5)
PROT SERPL-MCNC: 6.7 G/DL (ref 6–8.2)
PTH-INTACT SERPL-MCNC: 29.8 PG/ML (ref 14–72)
RBC # BLD AUTO: 4.18 M/UL (ref 4.2–5.4)
SODIUM SERPL-SCNC: 142 MMOL/L (ref 135–145)
T3 SERPL-MCNC: 87.3 NG/DL (ref 60–181)
T4 FREE SERPL-MCNC: 1.16 NG/DL (ref 0.93–1.7)
TSH SERPL DL<=0.005 MIU/L-ACNC: 2 UIU/ML (ref 0.38–5.33)
WBC # BLD AUTO: 5.8 K/UL (ref 4.8–10.8)

## 2022-01-21 PROCEDURE — 84480 ASSAY TRIIODOTHYRONINE (T3): CPT

## 2022-01-21 PROCEDURE — 84439 ASSAY OF FREE THYROXINE: CPT

## 2022-01-21 PROCEDURE — 84100 ASSAY OF PHOSPHORUS: CPT

## 2022-01-21 PROCEDURE — 80053 COMPREHEN METABOLIC PANEL: CPT

## 2022-01-21 PROCEDURE — 83970 ASSAY OF PARATHORMONE: CPT

## 2022-01-21 PROCEDURE — 36415 COLL VENOUS BLD VENIPUNCTURE: CPT

## 2022-01-21 PROCEDURE — 82330 ASSAY OF CALCIUM: CPT

## 2022-01-21 PROCEDURE — 84443 ASSAY THYROID STIM HORMONE: CPT

## 2022-01-21 PROCEDURE — 82306 VITAMIN D 25 HYDROXY: CPT

## 2022-01-21 PROCEDURE — 83735 ASSAY OF MAGNESIUM: CPT

## 2022-01-21 PROCEDURE — 85025 COMPLETE CBC W/AUTO DIFF WBC: CPT

## 2022-01-21 PROCEDURE — 77063 BREAST TOMOSYNTHESIS BI: CPT

## 2022-04-16 ENCOUNTER — OFFICE VISIT (OUTPATIENT)
Dept: URGENT CARE | Facility: PHYSICIAN GROUP | Age: 52
End: 2022-04-16
Payer: COMMERCIAL

## 2022-04-16 VITALS
BODY MASS INDEX: 32.65 KG/M2 | OXYGEN SATURATION: 97 % | HEIGHT: 62 IN | SYSTOLIC BLOOD PRESSURE: 122 MMHG | WEIGHT: 177.4 LBS | TEMPERATURE: 97.4 F | DIASTOLIC BLOOD PRESSURE: 84 MMHG | RESPIRATION RATE: 16 BRPM | HEART RATE: 66 BPM

## 2022-04-16 DIAGNOSIS — R11.0 NAUSEA: ICD-10-CM

## 2022-04-16 DIAGNOSIS — K52.9 ACUTE GASTROENTERITIS: ICD-10-CM

## 2022-04-16 DIAGNOSIS — R19.7 DIARRHEA, UNSPECIFIED TYPE: ICD-10-CM

## 2022-04-16 DIAGNOSIS — Z11.52 ENCOUNTER FOR SCREENING FOR COVID-19: ICD-10-CM

## 2022-04-16 LAB
EXTERNAL QUALITY CONTROL: NORMAL
SARS-COV+SARS-COV-2 AG RESP QL IA.RAPID: NEGATIVE

## 2022-04-16 PROCEDURE — 87426 SARSCOV CORONAVIRUS AG IA: CPT | Performed by: FAMILY MEDICINE

## 2022-04-16 PROCEDURE — 99203 OFFICE O/P NEW LOW 30 MIN: CPT | Mod: CS | Performed by: FAMILY MEDICINE

## 2022-04-16 RX ORDER — LOSARTAN POTASSIUM 25 MG/1
25 TABLET ORAL DAILY
COMMUNITY
Start: 2022-02-07 | End: 2023-11-15 | Stop reason: SDUPTHER

## 2022-04-16 RX ORDER — ONDANSETRON 4 MG/1
TABLET, ORALLY DISINTEGRATING ORAL
Qty: 20 TABLET | Refills: 0 | Status: SHIPPED | OUTPATIENT
Start: 2022-04-16 | End: 2022-04-29

## 2022-04-16 RX ORDER — DICYCLOMINE HCL 20 MG
TABLET ORAL
Qty: 20 TABLET | Refills: 0 | Status: SHIPPED | OUTPATIENT
Start: 2022-04-16 | End: 2022-04-29

## 2022-04-16 RX ORDER — VITAMIN E 268 MG
400 CAPSULE ORAL DAILY
COMMUNITY

## 2022-04-16 RX ORDER — OMEPRAZOLE 20 MG/1
20 CAPSULE, DELAYED RELEASE ORAL DAILY
COMMUNITY

## 2022-04-16 ASSESSMENT — FIBROSIS 4 INDEX: FIB4 SCORE: 1.08

## 2022-04-16 NOTE — PROGRESS NOTES
"Chief Complaint:    Chief Complaint   Patient presents with   • Abdominal Pain     All symptoms began Wednesday. Pt states she also feels \"foggy\" in her head.    • Nausea          • Diarrhea       History of Present Illness:    She has chills, nausea, queasy feeling in stomach, and diarrhea for few days. No fever, nasal symptoms, sore throat, cough, vomiting, or urine symptoms. Would like to check Covid test. Reports one time she had similar symptoms, her calcium was low.      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 at SURGERY [facility]   • FAITH BY LAPAROSCOPY  9/27/2012    Performed by Jana Henriquez M.D. at SURGERY [facility]   • PELVISCOPY  9/16/2010    Performed by LALY FALLON at SURGERY [facility]   • ABDOMINAL HYSTERECTOMY TOTAL  2006    fibroid removal      Social History:    Social History     Socioeconomic History   • Marital status:      Spouse name: Not on file   • Number of children: Not on file   • Years of education: Not on file   • Highest education level: Not on file   Occupational History   • Occupation: ER      Employer: [employer]   Tobacco Use   • Smoking status: Never Smoker   • Smokeless tobacco: Never Used   Vaping Use   • Vaping Use: Never used   Substance and Sexual Activity   • Alcohol use: Yes     Comment: glass of wine every six months   • Drug use: No   • Sexual activity: Yes     Partners: Male     Birth control/protection: Surgical   Other Topics Concern   • Not on file   Social History Narrative   • Not on file     Social Determinants of Health     Financial Resource " "Strain: Not on file   Food Insecurity: Not on file   Transportation Needs: Not on file   Physical Activity: Not on file   Stress: Not on file   Social Connections: Not on file   Intimate Partner Violence: Not on file   Housing Stability: Not on file     Family History:    Family History   Problem Relation Age of Onset   • Diabetes Mother         diet controlled   • Hypertension Father    • Heart Disease Father         MI age 61   • Cancer Father         prostate ca   • Cancer Sister         ovarian lesion   • Heart Disease Brother         blood clot heart     Medications:    Current Outpatient Medications on File Prior to Visit   Medication Sig Dispense Refill   • losartan (COZAAR) 25 MG Tab Take 25 mg by mouth every day. FOR 90 DAYS     • omeprazole (PRILOSEC) 20 MG delayed-release capsule      • vitamin e (VITAMIN E) 400 UNIT Cap vitamin E 400 unit capsule   Take 1 capsule every day by oral route.     • BIOTIN PO Take  by mouth.     • Cyanocobalamin (VITAMIN B 12 PO) Take  by mouth.     • Multiple Vitamin (MULTIVITAMIN PO) Take 1 Tab by mouth every day.     • Calcium Carbonate-Vitamin D (CALCIUM 600+D HIGH POTENCY PO) Take 2 Tablets by mouth every day. 2 tabs in am  1 tab in pm     • ondansetron (ZOFRAN ODT) 4 MG TABLET DISPERSIBLE Take 1 Tablet by mouth every 8 hours as needed. 10 Tablet 1   • clotrimazole-betamethasone (LOTRISONE) 1-0.05 % Cream Apply 1 Application to affected area(s) 2 times a day. 45 g 0   • vitamin D, Ergocalciferol, (DRISDOL) 38161 UNIT CAPS capsule Take 50,000 Units by mouth every Monday.         No current facility-administered medications on file prior to visit.     Allergies:    Allergies   Allergen Reactions   • Pork Allergy      itching       Vitals:    Vitals:    04/16/22 1427   BP: 122/84   Pulse: 66   Resp: 16   Temp: 36.3 °C (97.4 °F)   TempSrc: Temporal   SpO2: 97%   Weight: 80.5 kg (177 lb 6.4 oz)   Height: 1.575 m (5' 2\")       Physical Exam:    Constitutional: Vital signs " reviewed. Appears well-developed and well-nourished. No acute distress.   Eyes: Sclera white, conjunctivae clear.   ENT: External ears normal. Hearing normal.  Neck: Neck supple.   Cardiovascular: Regular rate and rhythm. No murmur.  Pulmonary/Chest: Respirations non-labored. Clear to auscultation bilaterally.  Abdomen: Bowel sounds are normal active. Soft, non-distended, and non-tender to palpation.  Musculoskeletal: Normal gait. No muscular atrophy or weakness.  Neurological: Alert and oriented to person, place, and time. Muscle tone normal. Coordination normal.   Skin: No rashes or lesions. Warm, dry, normal turgor.  Psychiatric: Normal mood and affect. Behavior is normal. Judgment and thought content normal.       Diagnostics:    POCT SARS-COV Antigen FABIAN (Symptomatic only)  Order: [id number]   Status: Final result     Visible to patient: No (scheduled for 2022  1:26 PM)     Next appt: 2022 at 08:30 AM in Endocrinology (Eran Regalado M.D.)     Dx: Encounter for screening for COVID-19     0 Result Notes     Ref Range & Units  3:26 PM   Internal   Valid    SARS-COV ANTIGEN FABIAN Negative, Indeterminate, None Detected, Valid, Invalid, Pass Negative    Resulting Agency  [facility]              Specimen Collected: 22  3:26 PM Last Resulted: 22  3:26 PM             Medical Decision Makin. Acute gastroenteritis  - ondansetron (ZOFRAN ODT) 4 MG TABLET DISPERSIBLE; 1 TAB, ALLOW TO DISINTEGRATE IN MOUTH, EVERY 8 HOURS ONLY IF NEEDED FOR NAUSEA OR VOMITING.  Dispense: 20 Tablet; Refill: 0  - dicyclomine (BENTYL) 20 MG Tab; 1 TAB BY MOUTH EVERY 6 HOURS ONLY IF NEEDED FOR DIARRHEA AND/OR ABDOMINAL CRAMPS.  Dispense: 20 Tablet; Refill: 0    2. Nausea  - ondansetron (ZOFRAN ODT) 4 MG TABLET DISPERSIBLE; 1 TAB, ALLOW TO DISINTEGRATE IN MOUTH, EVERY 8 HOURS ONLY IF NEEDED FOR NAUSEA OR VOMITING.  Dispense: 20 Tablet; Refill: 0    3. Diarrhea, unspecified type  - dicyclomine (BENTYL)  20 MG Tab; 1 TAB BY MOUTH EVERY 6 HOURS ONLY IF NEEDED FOR DIARRHEA AND/OR ABDOMINAL CRAMPS.  Dispense: 20 Tablet; Refill: 0    4. Encounter for screening for COVID-19  - POCT SARS-COV Antigen FABIAN (Symptomatic only)      Work note given - She had a negative Covid test in our clinic today. Excuse for 4/15 thru and including 4/18/22. May return sooner if feeling better.    Discussed with her DDX, management options, and risks, benefits, and alternatives to treatment plan agreed upon.    She has chills, nausea, queasy feeling in stomach, and diarrhea for few days. No fever, nasal symptoms, sore throat, cough, vomiting, or urine symptoms. Would like to check Covid test. Reports one time she had similar symptoms, her calcium was low.    POC Covid test is negative.    Advised our lab is not back here until 4/18/22, if she feels need to get her calcium level checked, she should go to Emergency Room. Advised most of the time, her GI symptoms are due to self-limited condition.    Recommended treat symptoms with medication as needed, otherwise further observation and see if symptoms will self-resolve as likely viral etiology at this time.    Agreeable to medications prescribed for symptomatic treatment.    Discussed expected course of duration, time for improvement, and to seek follow-up in Emergency Room, urgent care, or with PCP if getting worse at any time or not improving within expected time frame.

## 2022-04-16 NOTE — LETTER
April 16, 2022         Patient: Yadira Suarez   YOB: 1970   Date of Visit: 4/16/2022           To Whom it May Concern:    Yadira Suarez was seen in my clinic on 4/16/2022.     She had a negative Covid test in our clinic today.    Please excuse from work for 4/15 thru and including 4/18/22 due to medical condition.    May return sooner if feeling better.    If you have any questions or concerns, please don't hesitate to call.        Sincerely,           Abdoulaye Rosales M.D.  Electronically Signed

## 2022-04-29 ENCOUNTER — OFFICE VISIT (OUTPATIENT)
Dept: ENDOCRINOLOGY | Facility: MEDICAL CENTER | Age: 52
End: 2022-04-29
Attending: INTERNAL MEDICINE
Payer: COMMERCIAL

## 2022-04-29 VITALS
DIASTOLIC BLOOD PRESSURE: 70 MMHG | WEIGHT: 180 LBS | OXYGEN SATURATION: 97 % | BODY MASS INDEX: 33.99 KG/M2 | SYSTOLIC BLOOD PRESSURE: 108 MMHG | HEIGHT: 61 IN | HEART RATE: 60 BPM

## 2022-04-29 DIAGNOSIS — E55.9 VITAMIN D DEFICIENCY: ICD-10-CM

## 2022-04-29 DIAGNOSIS — M85.80 OSTEOPENIA, UNSPECIFIED LOCATION: ICD-10-CM

## 2022-04-29 DIAGNOSIS — E89.2 S/P PARATHYROIDECTOMY: ICD-10-CM

## 2022-04-29 PROBLEM — Z90.89 S/P PARATHYROIDECTOMY: Status: ACTIVE | Noted: 2022-04-29

## 2022-04-29 PROBLEM — Z98.890 S/P PARATHYROIDECTOMY: Status: ACTIVE | Noted: 2022-04-29

## 2022-04-29 PROCEDURE — 99204 OFFICE O/P NEW MOD 45 MIN: CPT | Performed by: INTERNAL MEDICINE

## 2022-04-29 PROCEDURE — 99212 OFFICE O/P EST SF 10 MIN: CPT | Performed by: INTERNAL MEDICINE

## 2022-04-29 RX ORDER — CHOLECALCIFEROL (VITAMIN D3) 50 MCG
2000 TABLET ORAL DAILY
COMMUNITY

## 2022-04-29 ASSESSMENT — PATIENT HEALTH QUESTIONNAIRE - PHQ9: CLINICAL INTERPRETATION OF PHQ2 SCORE: 0

## 2022-04-29 ASSESSMENT — FIBROSIS 4 INDEX: FIB4 SCORE: 1.08

## 2022-04-29 NOTE — PROGRESS NOTES
Chief Complaint: Consult requested by KISHOR Castorena for evaluation of primary hyperparathyroidism status post parathyroidectomy, osteoporosis/osteopenia, vitamin D deficiency      HPI:     Yadira Suarez is a 51 y.o. female with history of hypercalcemia secondary to primary hyperparathyroidism diagnosed approximately in [id number] with serum calcium levels of 10.4 and inappropriately elevated intact PTH levels of 90.  Surprisingly she was presenting with blepharospasm and Scout facial spasm and lower extremity spasms.  She was seen by endocrinologist at [facility] and had a complete work-up and was diagnosed with primary hyperparathyroidism secondary to a right inferior parathyroid adenoma and underwent parathyroidectomy on May 2019 by Dr. Castro at [facility]    Prior to surgery she had a complete work-up ruling out thyroid nodules and multiple myeloma as well as FHH.  Her bone density at [facility] on March 2019 showed low bone mass with the lowest T score of -3.6 for the left distal radius.    She is currently not on antiresorptive therapy.    She is taking vitamin D 50,000 units weekly    She is not taking calcium supplements    She denies any falls or fractures.    Her most recent labs show stable normal serum calcium of 9.4 with an ionized calcium 1.2 and normal PTH of 29.  Her vitamin D was low at 25 on January 21, 2022 TSH was normal 2.0.      Currently she reports feeling well she still has some joint pains from her mid torso extending to the hips and lower extremities not explained by her normal calcium levels.  She no longer has the blepharospasm and hemifacial spasms and spasms of her lower extremities        Patient's medications, allergies, and social histories were reviewed and updated as appropriate.      ROS:     CONS:     No fever, no chills, no weight loss, no fatigue   EYES:      No diplopia, no blurry vision, no redness of eyes, no swelling of eyelids   ENT:    No hearing loss, No ear pain, No sore  throat, no dysphagia, no neck swelling   CV:     No chest pain, no palpitations, no claudication, no orthopnea, no PND   PULM:    No SOB, no cough, no hemoptysis, no wheezing    GI:   No nausea, no vomiting, no diarrhea, no constipation, no bloody stools   :  Passing urine well, no dysuria, no hematuria   ENDO:   No polyuria, no polydipsia, no heat intolerance, no cold intolerance   NEURO: No headaches, no dizziness, no convulsions, no tremors   MUSC:  No joint swellings, reports arthralgias, no myalgias, no weakness   SKIN:   No rash, no ulcers, no dry skin   PSYCH:   No depression, no anxiety, no difficulty sleeping       Past Medical History:  Patient Active Problem List    Diagnosis Date Noted   • Osteopenia 04/29/2022   • S/P parathyroidectomy ([facility]) 04/29/2022   • Hyperparathyroidism ([facility]) 01/23/2019   • Hemifacial spasm 10/09/2018   • Dyslipidemia 12/28/2014   • Preventative health care 10/24/2013   • S/P laparoscopic cholecystectomy 09/25/2012   • History of hypertension 04/21/2011   • S/P ERICA (total abdominal hysterectomy) 01/14/2011   • Vitamin D deficiency 01/14/2011   • History of vertigo 01/14/2011   • Microscopic hematuria 01/14/2011       Past Surgical History:  Past Surgical History:   Procedure Laterality Date   • PARATHYROIDECTOMY Right 05/02/2019   • ABDOMINOPLASTY  12/11/2013    Performed by Sherrie Knott M.D. at SURGERY [facility]   • FAITH BY LAPAROSCOPY  9/27/2012    Performed by Jana Henriquez M.D. at SURGERY [facility]   • PELVISCOPY  9/16/2010    Performed by LALY FALLON at SURGERY [facility]   • ABDOMINAL HYSTERECTOMY TOTAL  2006    fibroid removal         Allergies:  Pork allergy     Current Medications:    Current Outpatient Medications:   •  Cholecalciferol (VITAMIN D) 2000 UNIT Tab, Take 2,000 Units by mouth every day., Disp: , Rfl:   •  losartan (COZAAR) 25 MG Tab, Take 25 mg by mouth every day. FOR 90 DAYS, Disp: , Rfl:   •  omeprazole (PRILOSEC) 20  "MG delayed-release capsule, Take 20 mg by mouth every day., Disp: , Rfl:   •  vitamin e (VITAMIN E) 400 UNIT Cap, vitamin E 400 unit capsule  Take 1 capsule every day by oral route., Disp: , Rfl:   •  Cyanocobalamin (VITAMIN B 12 PO), Take  by mouth., Disp: , Rfl:   •  Multiple Vitamin (MULTIVITAMIN PO), Take 1 Tab by mouth every day., Disp: , Rfl:   •  vitamin D, Ergocalciferol, (DRISDOL) 30627 UNIT CAPS capsule, Take 50,000 Units by mouth every Monday.  , Disp: , Rfl:     Social History:  Social History     Socioeconomic History   • Marital status:      Spouse name: Not on file   • Number of children: Not on file   • Years of education: Not on file   • Highest education level: Not on file   Occupational History   • Occupation: ER      Employer: [employer]   Tobacco Use   • Smoking status: Never Smoker   • Smokeless tobacco: Never Used   Vaping Use   • Vaping Use: Never used   Substance and Sexual Activity   • Alcohol use: Yes     Comment: glass of wine every six months   • Drug use: No   • Sexual activity: Yes     Partners: Male     Birth control/protection: Surgical   Other Topics Concern   • Not on file   Social History Narrative   • Not on file     Social Determinants of Health     Financial Resource Strain: Not on file   Food Insecurity: Not on file   Transportation Needs: Not on file   Physical Activity: Not on file   Stress: Not on file   Social Connections: Not on file   Intimate Partner Violence: Not on file   Housing Stability: Not on file        Family History:   Family History   Problem Relation Age of Onset   • Diabetes Mother         diet controlled   • Hypertension Father    • Heart Disease Father         MI age 61   • Cancer Father         prostate ca   • Cancer Sister         ovarian lesion   • Heart Disease Brother         blood clot heart         PHYSICAL EXAM:   Vital signs: /70   Pulse 60   Ht 1.549 m (5' 1\")   Wt 81.6 kg (180 lb)   SpO2 97%   BMI 34.01 kg/m²   GENERAL: " Well-developed, well-nourished  in no apparent distress.   EYE: No ocular and eyelid asymmetry, Anicteric sclerae,  PERRL  HENT: Hearing grossly intact, Normocephalic, atraumatic. Pink, moist mucous membranes, No exudate  NECK: Supple. Trachea midline. thyroid is normal in size without nodules or tenderness, there is a well-healed transverse scar from her parathyroid surgery.  CARDIOVASCULAR: Regular rate and rhythm. No murmurs, rubs, or gallops.   LUNGS: Clear to auscultation bilaterally   ABDOMEN: Soft, nontender with positive bowel sounds.   EXTREMITIES: No clubbing, cyanosis, or edema.   NEUROLOGICAL: Cranial nerves II-XII are grossly intact   Symmetric reflexes at the patella no proximal muscle weakness  LYMPH: No cervical, supraclavicular,  adenopathy palpated.   SKIN: No rashes, lesions. Turgor is normal.    Labs:  Lab Results   Component Value Date/Time    WBC 5.8 01/21/2022 04:40 PM    WBC 5.1 02/04/2011 07:47 AM    RBC 4.18 (L) 01/21/2022 04:40 PM    RBC 4.46 02/04/2011 07:47 AM    HEMOGLOBIN 13.5 01/21/2022 04:40 PM    MCV 94.3 01/21/2022 04:40 PM    MCV 96 02/04/2011 07:47 AM    MCH 32.3 01/21/2022 04:40 PM    MCH 32.1 02/04/2011 07:47 AM    MCHC 34.3 01/21/2022 04:40 PM    RDW 43.1 01/21/2022 04:40 PM    RDW 13.2 02/04/2011 07:47 AM    MPV 10.5 01/21/2022 04:40 PM       Lab Results   Component Value Date/Time    SODIUM 142 01/21/2022 04:40 PM    POTASSIUM 4.4 01/21/2022 04:40 PM    CHLORIDE 108 01/21/2022 04:40 PM    CO2 23 01/21/2022 04:40 PM    ANION 11.0 01/21/2022 04:40 PM    GLUCOSE 115 (H) 01/21/2022 04:40 PM    BUN 17 01/21/2022 04:40 PM    CREATININE 0.86 01/21/2022 04:40 PM    CREATININE 0.91 02/04/2011 07:47 AM    CALCIUM 9.4 01/21/2022 04:40 PM    ASTSGOT 18 01/21/2022 04:40 PM    ALTSGPT 17 01/21/2022 04:40 PM    TBILIRUBIN 0.5 01/21/2022 04:40 PM    ALBUMIN 3.9 01/21/2022 04:40 PM    TOTPROTEIN 6.7 01/21/2022 04:40 PM    GLOBULIN 2.8 01/21/2022 04:40 PM    AGRATIO 1.4 01/21/2022 04:40 PM        Lab Results   Component Value Date/Time    CHOLSTRLTOT 212 (H) 05/07/2021 1143    TRIGLYCERIDE 83 05/07/2021 1143    HDL 66 05/07/2021 1143     (H) 05/07/2021 1143    CHOLHDLRAT 3.1 02/04/2011 0747       Lab Results   Component Value Date/Time    TSHULTRASEN 2.000 01/21/2022 1640     Lab Results   Component Value Date/Time    FREET4 1.16 01/21/2022 1640     No results found for: FREET3  No results found for: THYSTIMIG    No results found for: MICROSOMALA      Imaging:      ASSESSMENT/PLAN:     1. S/P parathyroidectomy (HCC)  Stable  Durable cure of primary hyperparathyroidism noted after parathyroidectomy  Calcium levels are normal  I do not expect her normal calcium levels to explain her joint pains therefore I recommend that she follow-up with her primary care for her joint pains  Recommend follow-up in 1 year with repeat calcium, vitamin D and intact PTH levels    2. Vitamin D deficiency  Recommend she take vitamin D3 5000 IU daily  We will repeat calcium vitamin D levels in 3 months and I will update her  Repeat labs in 1 year    3. Osteopenia, unspecified location  We will schedule for bone density and I will update her and make additional treatment recommendations  Recommend that she take calcium 600 mg twice a day and vitamin D3 5000 IU daily      Return in about 1 year (around 4/29/2023).        Thank you kindly for allowing me to participate in the thyroid care plan for this patient.    Eran Regalado MD, [facility], [facility]  04/29/22    CC:   Lg Carballo, A.P.N.

## 2022-06-07 ENCOUNTER — HOSPITAL ENCOUNTER (OUTPATIENT)
Dept: RADIOLOGY | Facility: MEDICAL CENTER | Age: 52
End: 2022-06-07
Attending: INTERNAL MEDICINE
Payer: COMMERCIAL

## 2022-06-07 DIAGNOSIS — M85.80 OSTEOPENIA, UNSPECIFIED LOCATION: ICD-10-CM

## 2022-06-07 PROCEDURE — 77080 DXA BONE DENSITY AXIAL: CPT

## 2022-08-17 NOTE — TELEPHONE ENCOUNTER
Pt left message returning call. Left message for pt to call back.    [Subsequent Evaluation] : a subsequent evaluation for [FreeTextEntry2] : ears  /  sinuses

## 2022-09-28 ENCOUNTER — IMMUNIZATION (OUTPATIENT)
Dept: OCCUPATIONAL MEDICINE | Facility: CLINIC | Age: 52
End: 2022-09-28

## 2022-09-28 DIAGNOSIS — Z23 NEED FOR VACCINATION: Primary | ICD-10-CM

## 2022-09-28 PROCEDURE — 90686 IIV4 VACC NO PRSV 0.5 ML IM: CPT | Performed by: PREVENTIVE MEDICINE

## 2022-11-09 ENCOUNTER — NON-PROVIDER VISIT (OUTPATIENT)
Dept: OCCUPATIONAL MEDICINE | Facility: CLINIC | Age: 52
End: 2022-11-09

## 2022-11-09 DIAGNOSIS — Z23 ENCOUNTER FOR IMMUNIZATION: ICD-10-CM

## 2022-11-09 DIAGNOSIS — Z71.84 TRAVEL ADVICE ENCOUNTER: ICD-10-CM

## 2022-11-09 PROCEDURE — 90632 HEPA VACCINE ADULT IM: CPT | Performed by: NURSE PRACTITIONER

## 2022-11-09 PROCEDURE — 90472 IMMUNIZATION ADMIN EACH ADD: CPT | Performed by: NURSE PRACTITIONER

## 2022-11-09 PROCEDURE — 90691 TYPHOID VACCINE IM: CPT | Performed by: NURSE PRACTITIONER

## 2022-11-09 PROCEDURE — 90471 IMMUNIZATION ADMIN: CPT | Performed by: NURSE PRACTITIONER

## 2022-11-10 NOTE — PROGRESS NOTES
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 No  Sick today? No  Allergies: Pork    The screening intake form was reviewed with the traveler. Health risks associated with their travel plans have been reviewed and discussed with the traveler. The traveler has been provided with vaccine information statements for the vaccines that are recommended and given the opportunity to discuss risks and benefits of vaccination and or medications. The traveler has received education on the travel itinerary provided and on the following topics.    Personal safety precautions: Yes  Food and water precautions: Yes  Management of traveler's diarrhea: Yes  Mosquito/insect bite prevention:Yes  Animal bites/Rabies prevention: No  High altitude precautions: No      RN comments:     Hep A #1 and typhoid vaccines administered, vis given.    Malaria prophylaxis recommended. Patient is aware of risks and benefits but declines medication at this time.  Will call for an appointment later in December once she knows how many days she will be in a transmission area.           Physician consultation required: no

## 2022-11-15 ENCOUNTER — PHARMACY VISIT (OUTPATIENT)
Dept: PHARMACY | Facility: MEDICAL CENTER | Age: 52
End: 2022-11-15
Payer: COMMERCIAL

## 2022-11-15 PROCEDURE — RXMED WILLOW AMBULATORY MEDICATION CHARGE: Performed by: INTERNAL MEDICINE

## 2023-01-16 ENCOUNTER — HOSPITAL ENCOUNTER (OUTPATIENT)
Dept: LAB | Facility: MEDICAL CENTER | Age: 53
End: 2023-01-16
Attending: INTERNAL MEDICINE
Payer: COMMERCIAL

## 2023-01-16 DIAGNOSIS — M85.80 OSTEOPENIA, UNSPECIFIED LOCATION: ICD-10-CM

## 2023-01-16 DIAGNOSIS — E89.2 S/P PARATHYROIDECTOMY: ICD-10-CM

## 2023-01-16 DIAGNOSIS — E55.9 VITAMIN D DEFICIENCY: ICD-10-CM

## 2023-01-16 LAB
25(OH)D3 SERPL-MCNC: 27 NG/ML (ref 30–100)
ALBUMIN SERPL BCP-MCNC: 4.2 G/DL (ref 3.2–4.9)
ALBUMIN/GLOB SERPL: 1.4 G/DL
ALP SERPL-CCNC: 92 U/L (ref 30–99)
ALT SERPL-CCNC: 16 U/L (ref 2–50)
ANION GAP SERPL CALC-SCNC: 12 MMOL/L (ref 7–16)
AST SERPL-CCNC: 15 U/L (ref 12–45)
BILIRUB SERPL-MCNC: 0.9 MG/DL (ref 0.1–1.5)
BUN SERPL-MCNC: 19 MG/DL (ref 8–22)
CALCIUM ALBUM COR SERPL-MCNC: 9.1 MG/DL (ref 8.5–10.5)
CALCIUM SERPL-MCNC: 9.3 MG/DL (ref 8.5–10.5)
CHLORIDE SERPL-SCNC: 113 MMOL/L (ref 96–112)
CO2 SERPL-SCNC: 23 MMOL/L (ref 20–33)
CREAT SERPL-MCNC: 0.79 MG/DL (ref 0.5–1.4)
GFR SERPLBLD CREATININE-BSD FMLA CKD-EPI: 90 ML/MIN/1.73 M 2
GLOBULIN SER CALC-MCNC: 2.9 G/DL (ref 1.9–3.5)
GLUCOSE SERPL-MCNC: 96 MG/DL (ref 65–99)
PHOSPHATE SERPL-MCNC: 2.9 MG/DL (ref 2.5–4.5)
POTASSIUM SERPL-SCNC: 4.2 MMOL/L (ref 3.6–5.5)
PROT SERPL-MCNC: 7.1 G/DL (ref 6–8.2)
PTH-INTACT SERPL-MCNC: 42.6 PG/ML (ref 14–72)
SODIUM SERPL-SCNC: 148 MMOL/L (ref 135–145)
T4 FREE SERPL-MCNC: 1.16 NG/DL (ref 0.93–1.7)
TSH SERPL DL<=0.005 MIU/L-ACNC: 1.29 UIU/ML (ref 0.38–5.33)

## 2023-01-16 PROCEDURE — 84100 ASSAY OF PHOSPHORUS: CPT

## 2023-01-16 PROCEDURE — 84165 PROTEIN E-PHORESIS SERUM: CPT

## 2023-01-16 PROCEDURE — 84443 ASSAY THYROID STIM HORMONE: CPT

## 2023-01-16 PROCEDURE — 83970 ASSAY OF PARATHORMONE: CPT

## 2023-01-16 PROCEDURE — 80053 COMPREHEN METABOLIC PANEL: CPT

## 2023-01-16 PROCEDURE — 84155 ASSAY OF PROTEIN SERUM: CPT

## 2023-01-16 PROCEDURE — 82306 VITAMIN D 25 HYDROXY: CPT

## 2023-01-16 PROCEDURE — 36415 COLL VENOUS BLD VENIPUNCTURE: CPT

## 2023-01-16 PROCEDURE — 84439 ASSAY OF FREE THYROXINE: CPT

## 2023-01-17 ENCOUNTER — OFFICE VISIT (OUTPATIENT)
Dept: ENDOCRINOLOGY | Facility: MEDICAL CENTER | Age: 53
End: 2023-01-17
Attending: INTERNAL MEDICINE
Payer: COMMERCIAL

## 2023-01-17 VITALS
WEIGHT: 178.3 LBS | DIASTOLIC BLOOD PRESSURE: 78 MMHG | OXYGEN SATURATION: 96 % | HEART RATE: 89 BPM | BODY MASS INDEX: 33.66 KG/M2 | SYSTOLIC BLOOD PRESSURE: 128 MMHG | HEIGHT: 61 IN

## 2023-01-17 DIAGNOSIS — M81.0 AGE-RELATED OSTEOPOROSIS WITHOUT CURRENT PATHOLOGICAL FRACTURE: ICD-10-CM

## 2023-01-17 DIAGNOSIS — E89.2 S/P PARATHYROIDECTOMY: ICD-10-CM

## 2023-01-17 DIAGNOSIS — E55.9 VITAMIN D DEFICIENCY: ICD-10-CM

## 2023-01-17 DIAGNOSIS — G51.4 MYOKYMIA OF LEFT SIDE OF FACE: ICD-10-CM

## 2023-01-17 PROBLEM — M85.80 OSTEOPENIA: Status: RESOLVED | Noted: 2022-04-29 | Resolved: 2023-01-17

## 2023-01-17 PROCEDURE — 99214 OFFICE O/P EST MOD 30 MIN: CPT | Performed by: INTERNAL MEDICINE

## 2023-01-17 PROCEDURE — 99211 OFF/OP EST MAY X REQ PHY/QHP: CPT | Performed by: INTERNAL MEDICINE

## 2023-01-17 RX ORDER — ALENDRONATE SODIUM 70 MG/1
70 TABLET ORAL
Qty: 12 TABLET | Refills: 3 | Status: SHIPPED | OUTPATIENT
Start: 2023-01-17 | End: 2023-06-20 | Stop reason: SDUPTHER

## 2023-01-17 ASSESSMENT — PATIENT HEALTH QUESTIONNAIRE - PHQ9: CLINICAL INTERPRETATION OF PHQ2 SCORE: 0

## 2023-01-17 ASSESSMENT — FIBROSIS 4 INDEX: FIB4 SCORE: 0.95

## 2023-01-17 NOTE — PROGRESS NOTES
Chief Complaint: Follow up for Hypercalcemia from Primary Hyperparathyroidism    HPI:     Yadira Suarez is a 52 y.o. female here for follow up of hypercalcemia from primary hyperparathyroidism status post parathyroid surgery in 2019 at an outside facility.    She has a history of left-sided hemifacial spasm and originally her other providers thought that this was related to her parathyroid but obviously it is not as her left hemifacial spasm has persisted despite resolution of her primary hyperparathyroidism and hypercalcemia    She had a surgical complication with a previous dental surgery and this could be the underlying cause of her left hemifacial spasm and she has been referred to Dr. Del Cid who then referred her to Dr. Schumacher, neurology      Her most recent labs show that her calcium and PTH levels are normal  She denies interval falls and fractures  No interval kidney stones  She feels good otherwise but is still concerned about her hemifacial spasm      Her vitamin D is low at 27 we talked about  Increasing her vitamin D to daily administration    She does have osteoporosis based upon the bone density on June 7, 2022 showing the lowest T score of -3.4 for the left femur    We discussed treatment of osteoporosis today  And starting oral bisphosphonate    Patient's medications, allergies, and social histories were reviewed and updated as appropriate.      ROS:       CONS:     No fever, no chills   EYES:     No diplopia, no blurry vision   CV:           No chest pain, no palpitations   PULM:     No SOB, no cough, no hemoptysis.   GI:            No nausea, no vomiting, no diarrhea, no constipation   ENDO:     No polyuria, no polydipsia, no heat intolerance, no cold intolerance     Past Medical History:  Problem List:  2023-01: Age-related osteoporosis without current pathological   fracture  2023-01: Myokymia of left side of face  2022-04: Osteopenia  2022-04: S/P parathyroidectomy (HCC)  2019-01:  "Hyperparathyroidism (HCC)  2018-10: Hemifacial spasm  2014-12: Dyslipidemia  2013-12: Elective procedure for unacceptable cosmetic appearance  2013-10: Preventative health care  2012-09: S/P laparoscopic cholecystectomy  2012-09: Abdominal pain, other specified site  2012-09: S/P laparoscopic cholecystectomy  2012-09: Bradycardia  2012-09: Nausea & vomiting  2012-09: Elevated brain natriuretic peptide (BNP) level  2011-04: History of hypertension  2011-01: S/P ERICA (total abdominal hysterectomy)  2011-01: Vitamin D deficiency  2011-01: History of vertigo  2011-01: Microscopic hematuria      Past Surgical History:  Past Surgical History:   Procedure Laterality Date    PARATHYROIDECTOMY Right 05/02/2019    ABDOMINOPLASTY  12/11/2013    Performed by Sherrie Knott M.D. at SURGERY [facility]    FAITH BY LAPAROSCOPY  9/27/2012    Performed by Jana Henriquez M.D. at SURGERY [facility]    PELVISCOPY  9/16/2010    Performed by LALY FALLON at SURGERY [facility]    ABDOMINAL HYSTERECTOMY TOTAL  2006    fibroid removal         Allergies:  Pork allergy     Social History:  Social History     Tobacco Use    Smoking status: Never    Smokeless tobacco: Never   Vaping Use    Vaping Use: Never used   Substance Use Topics    Alcohol use: Yes     Comment: glass of wine every six months    Drug use: No        Family History:   family history includes Cancer in her father and sister; Diabetes in her mother; Heart Disease in her brother and father; Hypertension in her father.      PHYSICAL EXAM:   Vital signs: /78 (BP Location: Left arm, Patient Position: Sitting, BP Cuff Size: Large adult long)   Pulse 89   Ht 1.549 m (5' 1\")   Wt 80.9 kg (178 lb 4.8 oz)   SpO2 96%   BMI 33.69 kg/m²   GENERAL: Well-developed, well-nourished in no apparent distress.   EYE:  No ocular asymmetry, PERRLA  HENT: Pink, moist mucous membranes.    NECK: No thyromegaly.   CARDIOVASCULAR:  No murmurs  LUNGS: Clear breath " sounds  ABDOMEN: Soft, nontender   EXTREMITIES: No clubbing, cyanosis, or edema.   NEUROLOGICAL: No gross focal motor abnormalities   LYMPH: No cervical adenopathy palpated.   SKIN: No rashes, lesions.       Labs:  Lab Results   Component Value Date/Time    WBC 5.8 2022 04:40 PM    WBC 5.1 2011 07:47 AM    RBC 4.18 (L) 2022 04:40 PM    RBC 4.46 2011 07:47 AM    HEMOGLOBIN 13.5 2022 04:40 PM    MCV 94.3 2022 04:40 PM    MCV 96 2011 07:47 AM    MCH 32.3 2022 04:40 PM    MCH 32.1 2011 07:47 AM    MCHC 34.3 2022 04:40 PM    RDW 43.1 2022 04:40 PM    RDW 13.2 2011 07:47 AM    MPV 10.5 2022 04:40 PM       Lab Results   Component Value Date/Time    SODIUM 148 (H) 2023 12:36 PM    POTASSIUM 4.2 2023 12:36 PM    CHLORIDE 113 (H) 2023 12:36 PM    CO2 23 2023 12:36 PM    ANION 12.0 2023 12:36 PM    GLUCOSE 96 2023 12:36 PM    BUN 19 2023 12:36 PM    CREATININE 0.79 2023 12:36 PM    CREATININE 0.91 2011 07:47 AM    CALCIUM 9.3 2023 12:36 PM    ASTSGOT 15 2023 12:36 PM    ALTSGPT 16 2023 12:36 PM    TBILIRUBIN 0.9 2023 12:36 PM    ALBUMIN 4.2 2023 12:36 PM    TOTPROTEIN 7.1 2023 12:36 PM    GLOBULIN 2.9 2023 12:36 PM    AGRATIO 1.4 2023 12:36 PM       Lab Results   Component Value Date/Time    TSHULTRASEN 1.290 2023 1236     No results found for: FREEDIR  No results found for: FREET3  No results found for: THYSTIMIG      Imagin2022 3:41 PM     HISTORY/REASON FOR EXAM:  Follow up for osteopenia L1-L4 levels, previous DEXA 3/2019 at [place] (see Care Everywhere), hysterectomy, adult bone fracture, hyperparathyroidism, hypertension.     TECHNIQUE/EXAM DESCRIPTION AND NUMBER OF VIEWS:  Dual x-ray bone densitometry was performed from the L1 through L4 levels and from the proximal left femur and distal left forearm utilizing the GE Prodigy  unit.     COMPARISON: Bone densitometry scan 3/26/2019 at [place].     FINDINGS:  The lumbar spine has a mean bone mineral density of 1.019 g/cm2, with a T score of -1.3 and a Z score of -1.4.     The proximal left femur has a mean bone mineral density of 0.919 g/cm2, with a T score of -0.7 and a Z score of -0.6.     The distal left forearm has a mean bone mineral density of 0.578 g/cm2, with a T score of -3.4 and a Z score of -3.3.     When compared with the most recent study dated 3/26/2019, there has been a 1.7% decrease in the bone mineral density of the proximal left femur and a 4.0% increase in the bone mineral density of the distal left forearm and a 2.8% decrease in the bone   mineral density of the lumbar spine.     IMPRESSION:     According to the World Health Organization classification, bone mineral density of this patient is osteopenic in the lumbar spine, osteoporotic in the left forearm and normal in the left femur.     10-year Probability of Fracture:  Major Osteoporotic     7.4%  Hip     0.4%  Population      [place] ()     Based on left femur neck BMD     INTERPRETING LOCATION: [address] [place] [address], [place], [address]      ASSESSMENT/PLAN:       1. Age-related osteoporosis without current pathological fracture  Stable  No interval fall or fracture   start alendronate 70 mg weekly  Reviewed side effects and alternatives  She should notify me if she has reactions to the medication  Discussed the importance of regular weightbearing exercise  Discussed the importance of good dental hygiene and regular dental visits  Discussed the importance of adequate calcium and vitamin supplementation  Reviewed fall precautions  She should notify me if she has any falls or fractures  Follow-up in 7 months with labs      2. S/P parathyroidectomy (HCC)  Durable cure post parathyroidectomy continue monitoring    3. Vitamin D deficiency  Uncontrolled  Increase vitamin D3 to 2000 IU daily continue monitoring    4.  Myokymia of left side of face  Unstable  Recommend follow-up with neurology      Return in about 7 months (around 8/17/2023).        Thank you kindly for allowing me to participate in the endocrine care plan for this patient.    Eran Regalado MD, [place], [facility]  01/17/23    CC:   KISHOR Castorena

## 2023-01-19 LAB
ALBUMIN SERPL ELPH-MCNC: 4.4 G/DL (ref 3.75–5.01)
ALPHA1 GLOB SERPL ELPH-MCNC: 0.24 G/DL (ref 0.19–0.46)
ALPHA2 GLOB SERPL ELPH-MCNC: 0.51 G/DL (ref 0.48–1.05)
B-GLOBULIN SERPL ELPH-MCNC: 0.87 G/DL (ref 0.48–1.1)
GAMMA GLOB SERPL ELPH-MCNC: 0.99 G/DL (ref 0.62–1.51)
INTERPRETATION SERPL IFE-IMP: NORMAL
MONOCLON BAND OBS SERPL: NORMAL
MONOCLONAL PROTEIN NL11656: NORMAL G/DL
PATHOLOGY STUDY: NORMAL
PROT SERPL-MCNC: 7 G/DL (ref 6.3–8.2)

## 2023-01-23 ENCOUNTER — PHARMACY VISIT (OUTPATIENT)
Dept: PHARMACY | Facility: MEDICAL CENTER | Age: 53
End: 2023-01-23
Payer: COMMERCIAL

## 2023-01-23 PROCEDURE — RXMED WILLOW AMBULATORY MEDICATION CHARGE: Performed by: INTERNAL MEDICINE

## 2023-03-31 ENCOUNTER — HOSPITAL ENCOUNTER (OUTPATIENT)
Dept: RADIOLOGY | Facility: MEDICAL CENTER | Age: 53
End: 2023-03-31
Attending: PSYCHIATRY & NEUROLOGY
Payer: COMMERCIAL

## 2023-03-31 DIAGNOSIS — D35.1 BENIGN NEOPLASM OF PARATHYROID GLAND: ICD-10-CM

## 2023-03-31 DIAGNOSIS — G99.2: ICD-10-CM

## 2023-03-31 DIAGNOSIS — R26.81 UNSTEADY: ICD-10-CM

## 2023-03-31 DIAGNOSIS — E13.40: ICD-10-CM

## 2023-03-31 DIAGNOSIS — G24.5 BLEPHAROSPASM: ICD-10-CM

## 2023-03-31 PROCEDURE — 72157 MRI CHEST SPINE W/O & W/DYE: CPT

## 2023-03-31 PROCEDURE — 700102 HCHG RX REV CODE 250 W/ 637 OVERRIDE(OP): Performed by: RADIOLOGY

## 2023-03-31 PROCEDURE — A9270 NON-COVERED ITEM OR SERVICE: HCPCS | Performed by: RADIOLOGY

## 2023-03-31 PROCEDURE — 700117 HCHG RX CONTRAST REV CODE 255: Performed by: PSYCHIATRY & NEUROLOGY

## 2023-03-31 PROCEDURE — A9579 GAD-BASE MR CONTRAST NOS,1ML: HCPCS | Performed by: PSYCHIATRY & NEUROLOGY

## 2023-03-31 PROCEDURE — 70553 MRI BRAIN STEM W/O & W/DYE: CPT

## 2023-03-31 PROCEDURE — 72156 MRI NECK SPINE W/O & W/DYE: CPT

## 2023-03-31 RX ORDER — DIAZEPAM 5 MG/1
5 TABLET ORAL
Status: COMPLETED | OUTPATIENT
Start: 2023-03-31 | End: 2023-03-31

## 2023-03-31 RX ADMIN — GADOTERIDOL 15 ML: 279.3 INJECTION, SOLUTION INTRAVENOUS at 11:04

## 2023-03-31 RX ADMIN — DIAZEPAM 5 MG: 5 TABLET ORAL at 09:15

## 2023-03-31 NOTE — DISCHARGE INSTRUCTIONS
MRI ADULT DISCHARGE INSTRUCTIONS    You have been medicated today for your scan. Please follow the instructions below to ensure your safe recovery. If you have any questions or problems, feel free to call us at [phone] or [phone].     1.   Have someone stay with you to assist you as needed.    2.   Do not drive or operate any mechanical devices.    3.   Do not perform any activity that requires concentration. Make no major decisions over the next 24 hours.     4.   Be careful changing positions from laying down to sitting or standing, as you may become dizzy.     5.   Do not drink alcohol for 48 hours.    6.   There are no restrictions for eating your normal meals. Drink fluids.    7.   You may continue your usual medications for pain, tranquilizers, muscle relaxants or sedatives when awake.     8.   Tomorrow, you may continue your normal daily activities.     9.   Pressure dressing on 10 - 15 minutes. If swelling or bleeding occurs when removed, continue placing direct pressure on injection site for another 5 minutes, or until bleeding stops.     I have been informed of and understand the above discharge instructions. Diazepam (VALIUM) Oral solution  What is this medicine?  You were prescribed DIAZEPAM (dye AZ e susana) for the procedure you had today. This medication is a benzodiazepine. It is used to treat anxiety and nervousness. It also can help treat alcohol withdrawal, relax muscles, and treat certain types of seizures.  This medicine may be used for other purposes; ask your health care provider or pharmacist if you have questions.  What side effects may I notice from receiving this medicine?  Side effects that you should report to your doctor or health care professional as soon as possible:  allergic reactions like skin rash, itching or hives, swelling of the face, lips, or tongue  angry, confused, depressed, other mood changes  breathing problems  feeling faint or lightheaded, falls  muscle cramps  problems  with balance, talking, walking  restlessness  tremors  trouble passing urine or change in the amount of urine  unusually weak or tired  Side effects that usually do not require medical attention (report to your doctor or health care professional if they continue or are bothersome):  difficulty sleeping, nightmares  dizziness, drowsiness, clumsiness, or unsteadiness, a hangover effect  headache  nausea, vomiting  This list may not describe all possible side effects. Call your doctor for medical advice about side effects. You may report side effects to FDA at 3-620-RMF-6703.

## 2023-04-05 ENCOUNTER — HOSPITAL ENCOUNTER (OUTPATIENT)
Dept: RADIOLOGY | Facility: MEDICAL CENTER | Age: 53
End: 2023-04-05
Attending: CHIROPRACTOR
Payer: COMMERCIAL

## 2023-04-05 DIAGNOSIS — M99.03 SOMATIC DYSFUNCTION OF LUMBAR REGION: ICD-10-CM

## 2023-04-05 DIAGNOSIS — M99.01 CERVICAL SEGMENT DYSFUNCTION: ICD-10-CM

## 2023-04-05 PROCEDURE — 72100 X-RAY EXAM L-S SPINE 2/3 VWS: CPT

## 2023-04-05 PROCEDURE — 72040 X-RAY EXAM NECK SPINE 2-3 VW: CPT

## 2023-04-09 SDOH — ECONOMIC STABILITY: FOOD INSECURITY: WITHIN THE PAST 12 MONTHS, THE FOOD YOU BOUGHT JUST DIDN'T LAST AND YOU DIDN'T HAVE MONEY TO GET MORE.: NEVER TRUE

## 2023-04-09 SDOH — ECONOMIC STABILITY: FOOD INSECURITY: WITHIN THE PAST 12 MONTHS, YOU WORRIED THAT YOUR FOOD WOULD RUN OUT BEFORE YOU GOT MONEY TO BUY MORE.: NEVER TRUE

## 2023-04-09 SDOH — ECONOMIC STABILITY: INCOME INSECURITY: IN THE LAST 12 MONTHS, WAS THERE A TIME WHEN YOU WERE NOT ABLE TO PAY THE MORTGAGE OR RENT ON TIME?: NO

## 2023-04-09 SDOH — ECONOMIC STABILITY: INCOME INSECURITY: HOW HARD IS IT FOR YOU TO PAY FOR THE VERY BASICS LIKE FOOD, HOUSING, MEDICAL CARE, AND HEATING?: NOT VERY HARD

## 2023-04-09 SDOH — ECONOMIC STABILITY: HOUSING INSECURITY
IN THE LAST 12 MONTHS, WAS THERE A TIME WHEN YOU DID NOT HAVE A STEADY PLACE TO SLEEP OR SLEPT IN A SHELTER (INCLUDING NOW)?: NO

## 2023-04-09 SDOH — ECONOMIC STABILITY: HOUSING INSECURITY: IN THE LAST 12 MONTHS, HOW MANY PLACES HAVE YOU LIVED?: 1

## 2023-04-09 SDOH — ECONOMIC STABILITY: TRANSPORTATION INSECURITY
IN THE PAST 12 MONTHS, HAS THE LACK OF TRANSPORTATION KEPT YOU FROM MEDICAL APPOINTMENTS OR FROM GETTING MEDICATIONS?: NO

## 2023-04-09 SDOH — HEALTH STABILITY: PHYSICAL HEALTH: ON AVERAGE, HOW MANY DAYS PER WEEK DO YOU ENGAGE IN MODERATE TO STRENUOUS EXERCISE (LIKE A BRISK WALK)?: 2 DAYS

## 2023-04-09 SDOH — HEALTH STABILITY: PHYSICAL HEALTH: ON AVERAGE, HOW MANY MINUTES DO YOU ENGAGE IN EXERCISE AT THIS LEVEL?: 20 MIN

## 2023-04-09 SDOH — ECONOMIC STABILITY: TRANSPORTATION INSECURITY
IN THE PAST 12 MONTHS, HAS LACK OF TRANSPORTATION KEPT YOU FROM MEETINGS, WORK, OR FROM GETTING THINGS NEEDED FOR DAILY LIVING?: NO

## 2023-04-09 ASSESSMENT — LIFESTYLE VARIABLES
SKIP TO QUESTIONS 9-10: 1
HOW OFTEN DO YOU HAVE A DRINK CONTAINING ALCOHOL: MONTHLY OR LESS
HOW OFTEN DO YOU HAVE SIX OR MORE DRINKS ON ONE OCCASION: NEVER
HOW MANY STANDARD DRINKS CONTAINING ALCOHOL DO YOU HAVE ON A TYPICAL DAY: 1 OR 2
AUDIT-C TOTAL SCORE: 1

## 2023-04-09 ASSESSMENT — SOCIAL DETERMINANTS OF HEALTH (SDOH)
IN A TYPICAL WEEK, HOW MANY TIMES DO YOU TALK ON THE PHONE WITH FAMILY, FRIENDS, OR NEIGHBORS?: MORE THAN THREE TIMES A WEEK
DO YOU BELONG TO ANY CLUBS OR ORGANIZATIONS SUCH AS CHURCH GROUPS UNIONS, FRATERNAL OR ATHLETIC GROUPS, OR SCHOOL GROUPS?: NO
HOW OFTEN DO YOU ATTEND CHURCH OR RELIGIOUS SERVICES?: 1 TO 4 TIMES PER YEAR
HOW OFTEN DO YOU ATTENT MEETINGS OF THE CLUB OR ORGANIZATION YOU BELONG TO?: NEVER
HOW OFTEN DO YOU GET TOGETHER WITH FRIENDS OR RELATIVES?: ONCE A WEEK

## 2023-04-11 ENCOUNTER — HOSPITAL ENCOUNTER (OUTPATIENT)
Dept: LAB | Facility: MEDICAL CENTER | Age: 53
End: 2023-04-11
Attending: INTERNAL MEDICINE
Payer: COMMERCIAL

## 2023-04-11 DIAGNOSIS — E89.2 S/P PARATHYROIDECTOMY: ICD-10-CM

## 2023-04-11 DIAGNOSIS — M85.80 OSTEOPENIA, UNSPECIFIED LOCATION: ICD-10-CM

## 2023-04-11 DIAGNOSIS — E55.9 VITAMIN D DEFICIENCY: ICD-10-CM

## 2023-04-11 LAB
25(OH)D3 SERPL-MCNC: 23 NG/ML (ref 30–100)
ALBUMIN SERPL BCP-MCNC: 3.9 G/DL (ref 3.2–4.9)
ALBUMIN/GLOB SERPL: 1.4 G/DL
ALP SERPL-CCNC: 92 U/L (ref 30–99)
ALT SERPL-CCNC: 19 U/L (ref 2–50)
ANION GAP SERPL CALC-SCNC: 10 MMOL/L (ref 7–16)
AST SERPL-CCNC: 16 U/L (ref 12–45)
BILIRUB SERPL-MCNC: 0.9 MG/DL (ref 0.1–1.5)
BUN SERPL-MCNC: 18 MG/DL (ref 8–22)
CALCIUM ALBUM COR SERPL-MCNC: 9.4 MG/DL (ref 8.5–10.5)
CALCIUM SERPL-MCNC: 9.3 MG/DL (ref 8.4–10.2)
CHLORIDE SERPL-SCNC: 102 MMOL/L (ref 96–112)
CO2 SERPL-SCNC: 24 MMOL/L (ref 20–33)
CREAT SERPL-MCNC: 0.82 MG/DL (ref 0.5–1.4)
GFR SERPLBLD CREATININE-BSD FMLA CKD-EPI: 86 ML/MIN/1.73 M 2
GLOBULIN SER CALC-MCNC: 2.7 G/DL (ref 1.9–3.5)
GLUCOSE SERPL-MCNC: 125 MG/DL (ref 65–99)
PHOSPHATE SERPL-MCNC: 4.1 MG/DL (ref 2.5–4.5)
POTASSIUM SERPL-SCNC: 4.1 MMOL/L (ref 3.6–5.5)
PROT SERPL-MCNC: 6.6 G/DL (ref 6–8.2)
PTH-INTACT SERPL-MCNC: 36.3 PG/ML (ref 14–72)
SODIUM SERPL-SCNC: 136 MMOL/L (ref 135–145)

## 2023-04-11 PROCEDURE — 80053 COMPREHEN METABOLIC PANEL: CPT

## 2023-04-11 PROCEDURE — 83970 ASSAY OF PARATHORMONE: CPT

## 2023-04-11 PROCEDURE — 82306 VITAMIN D 25 HYDROXY: CPT

## 2023-04-11 PROCEDURE — 36415 COLL VENOUS BLD VENIPUNCTURE: CPT

## 2023-04-11 PROCEDURE — 84100 ASSAY OF PHOSPHORUS: CPT

## 2023-04-11 SDOH — ECONOMIC STABILITY: INCOME INSECURITY: HOW HARD IS IT FOR YOU TO PAY FOR THE VERY BASICS LIKE FOOD, HOUSING, MEDICAL CARE, AND HEATING?: NOT VERY HARD

## 2023-04-11 SDOH — HEALTH STABILITY: PHYSICAL HEALTH: ON AVERAGE, HOW MANY MINUTES DO YOU ENGAGE IN EXERCISE AT THIS LEVEL?: 20 MIN

## 2023-04-11 SDOH — HEALTH STABILITY: MENTAL HEALTH
STRESS IS WHEN SOMEONE FEELS TENSE, NERVOUS, ANXIOUS, OR CAN'T SLEEP AT NIGHT BECAUSE THEIR MIND IS TROUBLED. HOW STRESSED ARE YOU?: ONLY A LITTLE

## 2023-04-11 SDOH — ECONOMIC STABILITY: HOUSING INSECURITY: IN THE LAST 12 MONTHS, HOW MANY PLACES HAVE YOU LIVED?: 1

## 2023-04-11 SDOH — ECONOMIC STABILITY: FOOD INSECURITY: WITHIN THE PAST 12 MONTHS, THE FOOD YOU BOUGHT JUST DIDN'T LAST AND YOU DIDN'T HAVE MONEY TO GET MORE.: NEVER TRUE

## 2023-04-11 SDOH — ECONOMIC STABILITY: INCOME INSECURITY: IN THE LAST 12 MONTHS, WAS THERE A TIME WHEN YOU WERE NOT ABLE TO PAY THE MORTGAGE OR RENT ON TIME?: NO

## 2023-04-11 SDOH — ECONOMIC STABILITY: FOOD INSECURITY: WITHIN THE PAST 12 MONTHS, YOU WORRIED THAT YOUR FOOD WOULD RUN OUT BEFORE YOU GOT MONEY TO BUY MORE.: NEVER TRUE

## 2023-04-11 SDOH — ECONOMIC STABILITY: TRANSPORTATION INSECURITY
IN THE PAST 12 MONTHS, HAS LACK OF RELIABLE TRANSPORTATION KEPT YOU FROM MEDICAL APPOINTMENTS, MEETINGS, WORK OR FROM GETTING THINGS NEEDED FOR DAILY LIVING?: NO

## 2023-04-11 SDOH — HEALTH STABILITY: PHYSICAL HEALTH: ON AVERAGE, HOW MANY DAYS PER WEEK DO YOU ENGAGE IN MODERATE TO STRENUOUS EXERCISE (LIKE A BRISK WALK)?: 2 DAYS

## 2023-04-11 ASSESSMENT — LIFESTYLE VARIABLES
HOW OFTEN DO YOU HAVE SIX OR MORE DRINKS ON ONE OCCASION: NEVER
AUDIT-C TOTAL SCORE: 1
SKIP TO QUESTIONS 9-10: 1
HOW OFTEN DO YOU HAVE A DRINK CONTAINING ALCOHOL: MONTHLY OR LESS
HOW MANY STANDARD DRINKS CONTAINING ALCOHOL DO YOU HAVE ON A TYPICAL DAY: 1 OR 2

## 2023-04-11 ASSESSMENT — SOCIAL DETERMINANTS OF HEALTH (SDOH)
HOW OFTEN DO YOU ATTEND CHURCH OR RELIGIOUS SERVICES?: 1 TO 4 TIMES PER YEAR
HOW OFTEN DO YOU HAVE SIX OR MORE DRINKS ON ONE OCCASION: NEVER
HOW OFTEN DO YOU ATTEND CHURCH OR RELIGIOUS SERVICES?: 1 TO 4 TIMES PER YEAR
HOW MANY DRINKS CONTAINING ALCOHOL DO YOU HAVE ON A TYPICAL DAY WHEN YOU ARE DRINKING: 1 OR 2
DO YOU BELONG TO ANY CLUBS OR ORGANIZATIONS SUCH AS CHURCH GROUPS UNIONS, FRATERNAL OR ATHLETIC GROUPS, OR SCHOOL GROUPS?: NO
HOW HARD IS IT FOR YOU TO PAY FOR THE VERY BASICS LIKE FOOD, HOUSING, MEDICAL CARE, AND HEATING?: NOT VERY HARD
HOW OFTEN DO YOU HAVE A DRINK CONTAINING ALCOHOL: MONTHLY OR LESS
HOW OFTEN DO YOU GET TOGETHER WITH FRIENDS OR RELATIVES?: ONCE A WEEK
HOW OFTEN DO YOU ATTENT MEETINGS OF THE CLUB OR ORGANIZATION YOU BELONG TO?: NEVER
DO YOU BELONG TO ANY CLUBS OR ORGANIZATIONS SUCH AS CHURCH GROUPS UNIONS, FRATERNAL OR ATHLETIC GROUPS, OR SCHOOL GROUPS?: NO
HOW OFTEN DO YOU GET TOGETHER WITH FRIENDS OR RELATIVES?: ONCE A WEEK
IN A TYPICAL WEEK, HOW MANY TIMES DO YOU TALK ON THE PHONE WITH FAMILY, FRIENDS, OR NEIGHBORS?: MORE THAN THREE TIMES A WEEK
IN A TYPICAL WEEK, HOW MANY TIMES DO YOU TALK ON THE PHONE WITH FAMILY, FRIENDS, OR NEIGHBORS?: MORE THAN THREE TIMES A WEEK
WITHIN THE PAST 12 MONTHS, YOU WORRIED THAT YOUR FOOD WOULD RUN OUT BEFORE YOU GOT THE MONEY TO BUY MORE: NEVER TRUE
HOW OFTEN DO YOU ATTENT MEETINGS OF THE CLUB OR ORGANIZATION YOU BELONG TO?: NEVER

## 2023-04-12 ENCOUNTER — HOSPITAL ENCOUNTER (OUTPATIENT)
Dept: LAB | Facility: MEDICAL CENTER | Age: 53
End: 2023-04-12
Attending: STUDENT IN AN ORGANIZED HEALTH CARE EDUCATION/TRAINING PROGRAM
Payer: COMMERCIAL

## 2023-04-12 ENCOUNTER — OFFICE VISIT (OUTPATIENT)
Dept: MEDICAL GROUP | Facility: LAB | Age: 53
End: 2023-04-12
Payer: COMMERCIAL

## 2023-04-12 VITALS
RESPIRATION RATE: 14 BRPM | HEIGHT: 61 IN | SYSTOLIC BLOOD PRESSURE: 122 MMHG | BODY MASS INDEX: 33.05 KG/M2 | OXYGEN SATURATION: 98 % | WEIGHT: 175.04 LBS | HEART RATE: 60 BPM | DIASTOLIC BLOOD PRESSURE: 68 MMHG | TEMPERATURE: 97.3 F

## 2023-04-12 DIAGNOSIS — R59.1 LYMPHADENOPATHY: ICD-10-CM

## 2023-04-12 DIAGNOSIS — M62.81 MUSCLE WEAKNESS: ICD-10-CM

## 2023-04-12 DIAGNOSIS — M81.0 AGE-RELATED OSTEOPOROSIS WITHOUT CURRENT PATHOLOGICAL FRACTURE: ICD-10-CM

## 2023-04-12 LAB
BASOPHILS # BLD AUTO: 0.4 % (ref 0–1.8)
BASOPHILS # BLD: 0.02 K/UL (ref 0–0.12)
EOSINOPHIL # BLD AUTO: 0.16 K/UL (ref 0–0.51)
EOSINOPHIL NFR BLD: 2.8 % (ref 0–6.9)
ERYTHROCYTE [DISTWIDTH] IN BLOOD BY AUTOMATED COUNT: 42.6 FL (ref 35.9–50)
HCT VFR BLD AUTO: 41.7 % (ref 37–47)
HGB BLD-MCNC: 14.4 G/DL (ref 12–16)
IMM GRANULOCYTES # BLD AUTO: 0.02 K/UL (ref 0–0.11)
IMM GRANULOCYTES NFR BLD AUTO: 0.4 % (ref 0–0.9)
LYMPHOCYTES # BLD AUTO: 1.82 K/UL (ref 1–4.8)
LYMPHOCYTES NFR BLD: 31.9 % (ref 22–41)
MCH RBC QN AUTO: 31.9 PG (ref 27–33)
MCHC RBC AUTO-ENTMCNC: 34.5 G/DL (ref 33.6–35)
MCV RBC AUTO: 92.3 FL (ref 81.4–97.8)
MONOCYTES # BLD AUTO: 0.38 K/UL (ref 0–0.85)
MONOCYTES NFR BLD AUTO: 6.7 % (ref 0–13.4)
NEUTROPHILS # BLD AUTO: 3.3 K/UL (ref 2–7.15)
NEUTROPHILS NFR BLD: 57.8 % (ref 44–72)
NRBC # BLD AUTO: 0 K/UL
NRBC BLD-RTO: 0 /100 WBC
PLATELET # BLD AUTO: 228 K/UL (ref 164–446)
PMV BLD AUTO: 9.8 FL (ref 9–12.9)
RBC # BLD AUTO: 4.52 M/UL (ref 4.2–5.4)
WBC # BLD AUTO: 5.7 K/UL (ref 4.8–10.8)

## 2023-04-12 PROCEDURE — 99204 OFFICE O/P NEW MOD 45 MIN: CPT | Performed by: STUDENT IN AN ORGANIZED HEALTH CARE EDUCATION/TRAINING PROGRAM

## 2023-04-12 PROCEDURE — 85025 COMPLETE CBC W/AUTO DIFF WBC: CPT

## 2023-04-12 PROCEDURE — 83519 RIA NONANTIBODY: CPT

## 2023-04-12 PROCEDURE — 36415 COLL VENOUS BLD VENIPUNCTURE: CPT

## 2023-04-12 ASSESSMENT — FIBROSIS 4 INDEX: FIB4 SCORE: 0.93

## 2023-04-12 ASSESSMENT — ENCOUNTER SYMPTOMS
FEVER: 0
CHILLS: 0
SHORTNESS OF BREATH: 0

## 2023-04-12 NOTE — ASSESSMENT & PLAN NOTE
Acute unknown prognosis  -refer to ENT for lymph node biopsy, considering FNA may not be affective in diagnosis lymph node etiology

## 2023-04-12 NOTE — PROGRESS NOTES
"Subjective:     CC: Transferring to new provider.    HPI:   ORA presents today for the following;    Problem   Lymphadenopathy    Cervical lymph node measuring 1.3cm right submandibular per MRI     Age-Related Osteoporosis Without Current Pathological Fracture    Hx of parathyroidectomy. Following with endocrinology and currently on fosamax           Current Outpatient Medications Ordered in Epic   Medication Sig Dispense Refill    alendronate (FOSAMAX) 70 MG Tab Take 1 Tablet by mouth every 7 days. 12 Tablet 3    Cholecalciferol (VITAMIN D) 2000 UNIT Tab Take 2,000 Units by mouth every 48 hours.      losartan (COZAAR) 25 MG Tab Take 25 mg by mouth every day. FOR 90 DAYS      omeprazole (PRILOSEC) 20 MG delayed-release capsule Take 20 mg by mouth every day.      vitamin e (VITAMIN E) 400 UNIT Cap vitamin E 400 unit capsule   Take 1 capsule every day by oral route.      Multiple Vitamin (MULTIVITAMIN PO) Take 1 Tab by mouth every day.      Zoster Vac Recomb Adjuvanted (SHINGRIX) 50 MCG/0.5ML Recon Susp Inject  into the shoulder, thigh, or buttocks. 0.5 mL 0    COVID-19mRNA Bival Vac Moderna (MODERNA COVID-19 BIVALENT) 50 MCG/0.5ML Suspension injection Inject  into the shoulder, thigh, or buttocks. 0.5 mL 0     No current Epic-ordered facility-administered medications on file.           ROS:  Review of Systems   Constitutional:  Negative for chills and fever.   Respiratory:  Negative for shortness of breath.    Cardiovascular:  Negative for chest pain.     Objective:     Exam:  /68 (BP Location: Right arm, Patient Position: Sitting, BP Cuff Size: Adult long)   Pulse 60   Temp 36.3 °C (97.3 °F)   Resp 14   Ht 1.549 m (5' 1\")   Wt 79.4 kg (175 lb 0.7 oz)   SpO2 98%   BMI 33.07 kg/m²  Body mass index is 33.07 kg/m².    Physical Exam  Constitutional:       General: She is not in acute distress.     Appearance: She is not ill-appearing.   Pulmonary:      Effort: Pulmonary effort is normal.   Neurological: "      Mental Status: She is alert.   Psychiatric:         Mood and Affect: Mood normal.         Behavior: Behavior normal.         Thought Content: Thought content normal.         Judgment: Judgment normal.             Assessment & Plan:     Problem List Items Addressed This Visit       Age-related osteoporosis without current pathological fracture     Chronic  -continue to follow up with endocrinology   -continue fosomax          Lymphadenopathy     Acute unknown prognosis  -refer to ENT for lymph node biopsy, considering FNA may not be affective in diagnosis lymph node etiology          Relevant Orders    Referral to ENT    CBC WITH DIFFERENTIAL     Other Visit Diagnoses       Muscle weakness        Relevant Orders    ACETYLCHOLINE RECEP BINDING AB                Return in about 2 weeks (around 4/26/2023) for finish establishing discuss shaking .    Please note that this dictation was created using voice recognition software. I have made every reasonable attempt to correct obvious errors, but I expect that there are errors of grammar and possibly content that I did not discover before finalizing the note.

## 2023-04-13 DIAGNOSIS — R59.1 LYMPHADENOPATHY: ICD-10-CM

## 2023-04-16 LAB — ACHR BIND AB SER-SCNC: 0.1 NMOL/L (ref 0–0.4)

## 2023-04-17 DIAGNOSIS — R59.1 LYMPHADENOPATHY: ICD-10-CM

## 2023-04-20 ENCOUNTER — HOSPITAL ENCOUNTER (OUTPATIENT)
Dept: RADIOLOGY | Facility: MEDICAL CENTER | Age: 53
End: 2023-04-20
Attending: EMERGENCY MEDICINE
Payer: COMMERCIAL

## 2023-04-20 ENCOUNTER — OFFICE VISIT (OUTPATIENT)
Dept: MEDICAL GROUP | Facility: LAB | Age: 53
End: 2023-04-20
Payer: COMMERCIAL

## 2023-04-20 VITALS
SYSTOLIC BLOOD PRESSURE: 122 MMHG | DIASTOLIC BLOOD PRESSURE: 70 MMHG | RESPIRATION RATE: 12 BRPM | WEIGHT: 177 LBS | TEMPERATURE: 98.2 F | BODY MASS INDEX: 33.42 KG/M2 | OXYGEN SATURATION: 97 % | HEART RATE: 74 BPM | HEIGHT: 61 IN

## 2023-04-20 DIAGNOSIS — D36.0 BENIGN NEOPLASM OF LYMPH NODES: ICD-10-CM

## 2023-04-20 DIAGNOSIS — R59.1 LYMPHADENOPATHY: ICD-10-CM

## 2023-04-20 DIAGNOSIS — I10 ESSENTIAL HYPERTENSION: ICD-10-CM

## 2023-04-20 PROCEDURE — 99214 OFFICE O/P EST MOD 30 MIN: CPT | Performed by: STUDENT IN AN ORGANIZED HEALTH CARE EDUCATION/TRAINING PROGRAM

## 2023-04-20 PROCEDURE — 76536 US EXAM OF HEAD AND NECK: CPT

## 2023-04-20 ASSESSMENT — ENCOUNTER SYMPTOMS
WEIGHT LOSS: 0
DIAPHORESIS: 0
SHORTNESS OF BREATH: 0
FEVER: 0
CHILLS: 0

## 2023-04-20 ASSESSMENT — FIBROSIS 4 INDEX: FIB4 SCORE: 0.82

## 2023-04-20 NOTE — PROGRESS NOTES
"Subjective:     CC: follow up    HPI:   ORA presents today for the following;    Problem   Lymphadenopathy    Cervical lymph node measuring 1.3cm right submandibular per MRI    4/23/23  -US confirmed enlarged lymph nodes   -patient has an appointment with head and neck surgery in May      Essential Hypertension    BP is controlled with losartan 25mg          Current Outpatient Medications Ordered in Epic   Medication Sig Dispense Refill    Zoster Vac Recomb Adjuvanted (SHINGRIX) 50 MCG/0.5ML Recon Susp Inject  into the shoulder, thigh, or buttocks. 0.5 mL 0    alendronate (FOSAMAX) 70 MG Tab Take 1 Tablet by mouth every 7 days. 12 Tablet 3    COVID-19mRNA Bival Vac Moderna (MODERNA COVID-19 BIVALENT) 50 MCG/0.5ML Suspension injection Inject  into the shoulder, thigh, or buttocks. 0.5 mL 0    Cholecalciferol (VITAMIN D) 2000 UNIT Tab Take 2,000 Units by mouth every 48 hours.      losartan (COZAAR) 25 MG Tab Take 25 mg by mouth every day. FOR 90 DAYS      omeprazole (PRILOSEC) 20 MG delayed-release capsule Take 20 mg by mouth every day.      vitamin e (VITAMIN E) 400 UNIT Cap vitamin E 400 unit capsule   Take 1 capsule every day by oral route.      Multiple Vitamin (MULTIVITAMIN PO) Take 1 Tab by mouth every day.       No current [facility]-ordered facility-administered medications on file.           ROS:  Review of Systems   Constitutional:  Negative for chills, diaphoresis, fever and weight loss.   Respiratory:  Negative for shortness of breath.    Cardiovascular:  Negative for chest pain.     Objective:     Exam:  /70 (BP Location: Right arm, Patient Position: Sitting, BP Cuff Size: Adult)   Pulse 74   Temp 36.8 °C (98.2 °F) (Temporal)   Resp 12   Ht 1.549 m (5' 1\")   Wt 80.3 kg (177 lb)   SpO2 97%   BMI 33.44 kg/m²  Body mass index is 33.44 kg/m².    Physical Exam  Constitutional:       General: She is not in acute distress.     Appearance: She is not ill-appearing.   Pulmonary:      Effort: Pulmonary " effort is normal.   Neurological:      Mental Status: She is alert.   Psychiatric:         Mood and Affect: Mood normal.         Behavior: Behavior normal.         Thought Content: Thought content normal.         Judgment: Judgment normal.             Assessment & Plan:     Problem List Items Addressed This Visit       Essential hypertension     Chronic  -continue losartan 25mg          Lymphadenopathy     Acute unknown diagnosis   --follow up with surgery for possibly biopsy                 Please note that this dictation was created using voice recognition software. I have made every reasonable attempt to correct obvious errors, but I expect that there are errors of grammar and possibly content that I did not discover before finalizing the note.

## 2023-05-11 ENCOUNTER — HOSPITAL ENCOUNTER (OUTPATIENT)
Dept: LAB | Facility: MEDICAL CENTER | Age: 53
End: 2023-05-11
Attending: STUDENT IN AN ORGANIZED HEALTH CARE EDUCATION/TRAINING PROGRAM
Payer: COMMERCIAL

## 2023-05-11 DIAGNOSIS — M62.81 MUSCLE WEAKNESS: ICD-10-CM

## 2023-05-11 LAB
CK SERPL-CCNC: 87 U/L (ref 0–154)
CRP SERPL HS-MCNC: <0.3 MG/DL (ref 0–0.75)
ERYTHROCYTE [SEDIMENTATION RATE] IN BLOOD BY WESTERGREN METHOD: 15 MM/HOUR (ref 0–25)

## 2023-05-11 PROCEDURE — 85652 RBC SED RATE AUTOMATED: CPT

## 2023-05-11 PROCEDURE — 82550 ASSAY OF CK (CPK): CPT

## 2023-05-11 PROCEDURE — 86038 ANTINUCLEAR ANTIBODIES: CPT

## 2023-05-11 PROCEDURE — 36415 COLL VENOUS BLD VENIPUNCTURE: CPT

## 2023-05-11 PROCEDURE — 86140 C-REACTIVE PROTEIN: CPT

## 2023-05-13 LAB — NUCLEAR IGG SER QL IA: NORMAL

## 2023-05-16 ENCOUNTER — HOSPITAL ENCOUNTER (OUTPATIENT)
Dept: RADIOLOGY | Facility: MEDICAL CENTER | Age: 53
End: 2023-05-16
Attending: OTOLARYNGOLOGY
Payer: COMMERCIAL

## 2023-05-16 DIAGNOSIS — R59.0 LOCALIZED ENLARGED LYMPH NODES: ICD-10-CM

## 2023-05-16 PROCEDURE — 10005 FNA BX W/US GDN 1ST LES: CPT

## 2023-05-16 PROCEDURE — 88173 CYTOPATH EVAL FNA REPORT: CPT

## 2023-05-16 NOTE — PROGRESS NOTES
US guided left cervical lymph node fine needle aspiration done by Dr. Means; NON-SEDATION (no H&P required as this is a NON SEDATION procedure) left lateral aspect of neck access site, dressing CDI; x1 jar of cytolyt obtained, x1 vial RPMI obtained and hand delivered to pathology lab. Pt tolerated the procedure well. Pt hemodynamically stable pre/intra/post procedure; all questions and concerns answered prior to being d/c; patient provided with appropriate education for procedure; pt d/c home.

## 2023-05-17 LAB — CYTOLOGY REG CYTOL: NORMAL

## 2023-06-20 ENCOUNTER — OFFICE VISIT (OUTPATIENT)
Dept: MEDICAL GROUP | Facility: LAB | Age: 53
End: 2023-06-20
Payer: COMMERCIAL

## 2023-06-20 ENCOUNTER — TELEPHONE (OUTPATIENT)
Dept: MEDICAL GROUP | Facility: LAB | Age: 53
End: 2023-06-20

## 2023-06-20 VITALS
SYSTOLIC BLOOD PRESSURE: 126 MMHG | HEIGHT: 61 IN | WEIGHT: 180.8 LBS | DIASTOLIC BLOOD PRESSURE: 80 MMHG | RESPIRATION RATE: 14 BRPM | HEART RATE: 74 BPM | TEMPERATURE: 97.4 F | OXYGEN SATURATION: 96 % | BODY MASS INDEX: 34.14 KG/M2

## 2023-06-20 DIAGNOSIS — E66.9 OBESITY (BMI 30-39.9): ICD-10-CM

## 2023-06-20 DIAGNOSIS — M81.0 AGE-RELATED OSTEOPOROSIS WITHOUT CURRENT PATHOLOGICAL FRACTURE: ICD-10-CM

## 2023-06-20 PROCEDURE — 99214 OFFICE O/P EST MOD 30 MIN: CPT | Performed by: STUDENT IN AN ORGANIZED HEALTH CARE EDUCATION/TRAINING PROGRAM

## 2023-06-20 PROCEDURE — 3079F DIAST BP 80-89 MM HG: CPT | Performed by: STUDENT IN AN ORGANIZED HEALTH CARE EDUCATION/TRAINING PROGRAM

## 2023-06-20 PROCEDURE — 3074F SYST BP LT 130 MM HG: CPT | Performed by: STUDENT IN AN ORGANIZED HEALTH CARE EDUCATION/TRAINING PROGRAM

## 2023-06-20 RX ORDER — PHENTERMINE HYDROCHLORIDE 15 MG/1
15 CAPSULE ORAL EVERY MORNING
Qty: 30 CAPSULE | Refills: 0 | Status: SHIPPED | OUTPATIENT
Start: 2023-06-20 | End: 2023-07-21 | Stop reason: SDUPTHER

## 2023-06-20 ASSESSMENT — ENCOUNTER SYMPTOMS
CHILLS: 0
SHORTNESS OF BREATH: 0
FEVER: 0

## 2023-06-20 ASSESSMENT — FIBROSIS 4 INDEX: FIB4 SCORE: 0.82

## 2023-06-20 NOTE — PROGRESS NOTES
"Subjective:     CC: weight     HPI:   ORA presents today for the following;    Problem   Obesity (Bmi 30-39.9)    -patient over the past 2 years has gained 48 pounds   -due her leg condition she has not been able to exercise as she used.   -currently around 180lbs     -tries to eat healthy          Current Outpatient Medications Ordered in Epic   Medication Sig Dispense Refill    phentermine 15 MG capsule Take 1 Capsule by mouth every morning for 30 days. 30 Capsule 0    Zoster Vac Recomb Adjuvanted (SHINGRIX) 50 MCG/0.5ML Recon Susp Inject  into the shoulder, thigh, or buttocks. 0.5 mL 0    alendronate (FOSAMAX) 70 MG Tab Take 1 Tablet by mouth every 7 days. 12 Tablet 3    COVID-19mRNA Bival Vac Moderna (MODERNA COVID-19 BIVALENT) 50 MCG/0.5ML Suspension injection Inject  into the shoulder, thigh, or buttocks. 0.5 mL 0    Cholecalciferol (VITAMIN D) 2000 UNIT Tab Take 2,000 Units by mouth every 48 hours.      losartan (COZAAR) 25 MG Tab Take 25 mg by mouth every day. FOR 90 DAYS      omeprazole (PRILOSEC) 20 MG delayed-release capsule Take 20 mg by mouth every day.      vitamin e (VITAMIN E) 400 UNIT Cap vitamin E 400 unit capsule   Take 1 capsule every day by oral route.      Multiple Vitamin (MULTIVITAMIN PO) Take 1 Tab by mouth every day.       No current [facility]-ordered facility-administered medications on file.           ROS:  Review of Systems   Constitutional:  Negative for chills and fever.   Respiratory:  Negative for shortness of breath.    Cardiovascular:  Negative for chest pain.       Objective:     Exam:  /80 (BP Location: Right arm, Patient Position: Sitting, BP Cuff Size: Adult long)   Pulse 74   Temp 36.3 °C (97.4 °F)   Resp 14   Ht 1.549 m (5' 1\")   Wt 82 kg (180 lb 12.8 oz)   SpO2 96%   BMI 34.16 kg/m²  Body mass index is 34.16 kg/m².    Physical Exam  Constitutional:       General: She is not in acute distress.     Appearance: She is not ill-appearing.   Pulmonary:      Effort: " Pulmonary effort is normal.   Neurological:      Mental Status: She is alert.   Psychiatric:         Mood and Affect: Mood normal.         Behavior: Behavior normal.         Thought Content: Thought content normal.         Judgment: Judgment normal.               Assessment & Plan:     Problem List Items Addressed This Visit       Obesity (BMI 30-39.9)     Chronic-not controlled   -will start phentermine, discussed with patient   -PDMP checked, CS signed          Relevant Medications    phentermine 15 MG capsule    Other Relevant Orders    Controlled Substance Treatment Agreement           Return in about 4 weeks (around 7/18/2023) for Weight.    Please note that this dictation was created using voice recognition software. I have made every reasonable attempt to correct obvious errors, but I expect that there are errors of grammar and possibly content that I did not discover before finalizing the note.

## 2023-06-20 NOTE — ASSESSMENT & PLAN NOTE
Chronic-not controlled   -will start phentermine, discussed with patient   -PDMP checked, CS signed

## 2023-06-21 RX ORDER — ALENDRONATE SODIUM 70 MG/1
70 TABLET ORAL
Qty: 12 TABLET | Refills: 3 | Status: SHIPPED | OUTPATIENT
Start: 2023-06-21 | End: 2023-08-21 | Stop reason: SDUPTHER

## 2023-06-22 NOTE — TELEPHONE ENCOUNTER
I called Dr. Peraza's office and pt last exam was 11/17/2022.  I faxed records release to them at [email or web address] because their fax was down. Pt has upcoming appt 8/2023.    6/27/23: I called to confirm they received rcrds rqst and they had. They are faxing it to us today.     Records in chart

## 2023-07-20 ENCOUNTER — HOSPITAL ENCOUNTER (OUTPATIENT)
Dept: RADIOLOGY | Facility: MEDICAL CENTER | Age: 53
End: 2023-07-20
Attending: SURGERY
Payer: COMMERCIAL

## 2023-07-20 DIAGNOSIS — R59.0 LOCALIZED ENLARGED LYMPH NODES: ICD-10-CM

## 2023-07-20 PROCEDURE — 76536 US EXAM OF HEAD AND NECK: CPT

## 2023-07-21 ENCOUNTER — HOSPITAL ENCOUNTER (OUTPATIENT)
Dept: LAB | Facility: MEDICAL CENTER | Age: 53
End: 2023-07-21
Attending: INTERNAL MEDICINE
Payer: COMMERCIAL

## 2023-07-21 ENCOUNTER — OFFICE VISIT (OUTPATIENT)
Dept: MEDICAL GROUP | Facility: LAB | Age: 53
End: 2023-07-21
Payer: COMMERCIAL

## 2023-07-21 ENCOUNTER — HOSPITAL ENCOUNTER (OUTPATIENT)
Dept: LAB | Facility: MEDICAL CENTER | Age: 53
End: 2023-07-21
Attending: STUDENT IN AN ORGANIZED HEALTH CARE EDUCATION/TRAINING PROGRAM
Payer: COMMERCIAL

## 2023-07-21 VITALS
TEMPERATURE: 97.7 F | HEIGHT: 61 IN | DIASTOLIC BLOOD PRESSURE: 78 MMHG | SYSTOLIC BLOOD PRESSURE: 116 MMHG | WEIGHT: 172 LBS | OXYGEN SATURATION: 100 % | RESPIRATION RATE: 12 BRPM | BODY MASS INDEX: 32.47 KG/M2 | HEART RATE: 67 BPM

## 2023-07-21 DIAGNOSIS — E55.9 VITAMIN D DEFICIENCY: ICD-10-CM

## 2023-07-21 DIAGNOSIS — E66.9 OBESITY (BMI 30-39.9): ICD-10-CM

## 2023-07-21 DIAGNOSIS — G51.4 MYOKYMIA OF LEFT SIDE OF FACE: ICD-10-CM

## 2023-07-21 DIAGNOSIS — E89.2 S/P PARATHYROIDECTOMY: ICD-10-CM

## 2023-07-21 DIAGNOSIS — M81.0 AGE-RELATED OSTEOPOROSIS WITHOUT CURRENT PATHOLOGICAL FRACTURE: ICD-10-CM

## 2023-07-21 LAB
25(OH)D3 SERPL-MCNC: 40 NG/ML (ref 30–100)
ALBUMIN SERPL BCP-MCNC: 4.4 G/DL (ref 3.2–4.9)
ALBUMIN/GLOB SERPL: 1.6 G/DL
ALP SERPL-CCNC: 90 U/L (ref 30–99)
ALT SERPL-CCNC: 22 U/L (ref 2–50)
ANION GAP SERPL CALC-SCNC: 14 MMOL/L (ref 7–16)
AST SERPL-CCNC: 19 U/L (ref 12–45)
BILIRUB SERPL-MCNC: 0.7 MG/DL (ref 0.1–1.5)
BUN SERPL-MCNC: 20 MG/DL (ref 8–22)
CALCIUM ALBUM COR SERPL-MCNC: 9 MG/DL (ref 8.5–10.5)
CALCIUM SERPL-MCNC: 9.3 MG/DL (ref 8.5–10.5)
CHLORIDE SERPL-SCNC: 106 MMOL/L (ref 96–112)
CO2 SERPL-SCNC: 23 MMOL/L (ref 20–33)
CORTIS SERPL-MCNC: 15.2 UG/DL (ref 0–23)
CREAT SERPL-MCNC: 0.87 MG/DL (ref 0.5–1.4)
GFR SERPLBLD CREATININE-BSD FMLA CKD-EPI: 80 ML/MIN/1.73 M 2
GLOBULIN SER CALC-MCNC: 2.8 G/DL (ref 1.9–3.5)
GLUCOSE SERPL-MCNC: 101 MG/DL (ref 65–99)
PHOSPHATE SERPL-MCNC: 2.7 MG/DL (ref 2.5–4.5)
POTASSIUM SERPL-SCNC: 3.9 MMOL/L (ref 3.6–5.5)
PROT SERPL-MCNC: 7.2 G/DL (ref 6–8.2)
PTH-INTACT SERPL-MCNC: 36.1 PG/ML (ref 14–72)
SODIUM SERPL-SCNC: 143 MMOL/L (ref 135–145)

## 2023-07-21 PROCEDURE — 82533 TOTAL CORTISOL: CPT

## 2023-07-21 PROCEDURE — 83970 ASSAY OF PARATHORMONE: CPT

## 2023-07-21 PROCEDURE — 82306 VITAMIN D 25 HYDROXY: CPT

## 2023-07-21 PROCEDURE — 36415 COLL VENOUS BLD VENIPUNCTURE: CPT

## 2023-07-21 PROCEDURE — 99214 OFFICE O/P EST MOD 30 MIN: CPT | Performed by: STUDENT IN AN ORGANIZED HEALTH CARE EDUCATION/TRAINING PROGRAM

## 2023-07-21 PROCEDURE — 80053 COMPREHEN METABOLIC PANEL: CPT

## 2023-07-21 PROCEDURE — 3074F SYST BP LT 130 MM HG: CPT | Performed by: STUDENT IN AN ORGANIZED HEALTH CARE EDUCATION/TRAINING PROGRAM

## 2023-07-21 PROCEDURE — 84100 ASSAY OF PHOSPHORUS: CPT

## 2023-07-21 PROCEDURE — 3078F DIAST BP <80 MM HG: CPT | Performed by: STUDENT IN AN ORGANIZED HEALTH CARE EDUCATION/TRAINING PROGRAM

## 2023-07-21 RX ORDER — PHENTERMINE HYDROCHLORIDE 15 MG/1
15 CAPSULE ORAL EVERY MORNING
Qty: 30 CAPSULE | Refills: 0 | Status: SHIPPED | OUTPATIENT
Start: 2023-07-21 | End: 2023-08-20

## 2023-07-21 ASSESSMENT — ENCOUNTER SYMPTOMS
FEVER: 0
CHILLS: 0
SHORTNESS OF BREATH: 0

## 2023-07-21 ASSESSMENT — FIBROSIS 4 INDEX: FIB4 SCORE: 0.82

## 2023-07-21 NOTE — ASSESSMENT & PLAN NOTE
Chronic-not completely controlled  -continue phent 15mg, will increase in the future if she plateaus    -pdmp checked   -cs previously signed

## 2023-07-21 NOTE — PROGRESS NOTES
Subjective:     CC: weight check    HPI:   Yadira presents today for the following;    Problem   Obesity (Bmi 30-39.9)    -patient over the past 2 years has gained 48 pounds   -due her leg condition she has not been able to exercise as she used.   -currently around 180lbs     -tries to eat healthy     7/21/23  -original weight: 180lbs with clothes, BMI 34.16  -current weight: 172lbs with clothes, BMI 32.  -patient lost 8lbs   -patient was started on phent 15mg previously, she only complains of tolerable dry mouth  -patient is also consuming foods with prebiotics   -patient is also walking           Current Outpatient Medications Ordered in Epic   Medication Sig Dispense Refill    phentermine 15 MG capsule Take 1 Capsule by mouth every morning for 30 days. 30 Capsule 0    alendronate (FOSAMAX) 70 MG Tab Take 1 Tablet by mouth every 7 days. 12 Tablet 3    Cholecalciferol (VITAMIN D) 2000 UNIT Tab Take 2,000 Units by mouth every 48 hours.      losartan (COZAAR) 25 MG Tab Take 25 mg by mouth every day. FOR 90 DAYS      omeprazole (PRILOSEC) 20 MG delayed-release capsule Take 20 mg by mouth every day.      vitamin e (VITAMIN E) 400 UNIT Cap vitamin E 400 unit capsule   Take 1 capsule every day by oral route.      Multiple Vitamin (MULTIVITAMIN PO) Take 1 Tab by mouth every day.      Zoster Vac Recomb Adjuvanted (SHINGRIX) 50 MCG/0.5ML Recon Susp Inject  into the shoulder, thigh, or buttocks. 0.5 mL 0    COVID-19mRNA Bival Vac Moderna (MODERNA COVID-19 BIVALENT) 50 MCG/0.5ML Suspension injection Inject  into the shoulder, thigh, or buttocks. 0.5 mL 0     No current Epic-ordered facility-administered medications on file.           ROS:  Review of Systems   Constitutional:  Negative for chills and fever.   Respiratory:  Negative for shortness of breath.    Cardiovascular:  Negative for chest pain.       Objective:     Exam:  /78 (BP Location: Right arm, Patient Position: Sitting, BP Cuff Size: Adult)   Pulse 67    "Temp 36.5 °C (97.7 °F)   Resp 12   Ht 1.549 m (5' 1\")   Wt 78 kg (172 lb)   SpO2 100%   BMI 32.50 kg/m²  Body mass index is 32.5 kg/m².    Physical Exam  Constitutional:       General: She is not in acute distress.     Appearance: She is not ill-appearing.   Pulmonary:      Effort: Pulmonary effort is normal.   Neurological:      Mental Status: She is alert.   Psychiatric:         Mood and Affect: Mood normal.         Behavior: Behavior normal.         Thought Content: Thought content normal.         Judgment: Judgment normal.               Assessment & Plan:     Problem List Items Addressed This Visit       Obesity (BMI 30-39.9)     Chronic-not completely controlled  -continue phent 15mg, will increase in the future if she plateaus    -pdmp checked   -cs previously signed          Relevant Medications    phentermine 15 MG capsule       1 month weight check    Please note that this dictation was created using voice recognition software. I have made every reasonable attempt to correct obvious errors, but I expect that there are errors of grammar and possibly content that I did not discover before finalizing the note.        "

## 2023-08-21 ENCOUNTER — OFFICE VISIT (OUTPATIENT)
Dept: ENDOCRINOLOGY | Facility: MEDICAL CENTER | Age: 53
End: 2023-08-21
Attending: INTERNAL MEDICINE
Payer: COMMERCIAL

## 2023-08-21 VITALS
WEIGHT: 176.9 LBS | DIASTOLIC BLOOD PRESSURE: 74 MMHG | OXYGEN SATURATION: 98 % | RESPIRATION RATE: 20 BRPM | HEIGHT: 61 IN | BODY MASS INDEX: 33.4 KG/M2 | HEART RATE: 96 BPM | SYSTOLIC BLOOD PRESSURE: 122 MMHG

## 2023-08-21 DIAGNOSIS — M81.0 AGE-RELATED OSTEOPOROSIS WITHOUT CURRENT PATHOLOGICAL FRACTURE: ICD-10-CM

## 2023-08-21 DIAGNOSIS — E89.2 S/P PARATHYROIDECTOMY: ICD-10-CM

## 2023-08-21 DIAGNOSIS — E55.9 VITAMIN D DEFICIENCY: ICD-10-CM

## 2023-08-21 PROCEDURE — 99214 OFFICE O/P EST MOD 30 MIN: CPT | Performed by: INTERNAL MEDICINE

## 2023-08-21 PROCEDURE — 3074F SYST BP LT 130 MM HG: CPT | Performed by: INTERNAL MEDICINE

## 2023-08-21 PROCEDURE — 3078F DIAST BP <80 MM HG: CPT | Performed by: INTERNAL MEDICINE

## 2023-08-21 PROCEDURE — 99211 OFF/OP EST MAY X REQ PHY/QHP: CPT | Performed by: INTERNAL MEDICINE

## 2023-08-21 RX ORDER — ALENDRONATE SODIUM 70 MG/1
70 TABLET ORAL
Qty: 12 TABLET | Refills: 3 | Status: SHIPPED | OUTPATIENT
Start: 2023-08-21 | End: 2023-11-14 | Stop reason: SDUPTHER

## 2023-08-21 RX ORDER — PHENTERMINE HYDROCHLORIDE 15 MG/1
CAPSULE ORAL
COMMUNITY
End: 2023-09-01

## 2023-08-21 RX ORDER — MAGNESIUM GLYCINATE 100 MG
200 CAPSULE ORAL DAILY
COMMUNITY
Start: 2023-07-31

## 2023-08-21 RX ORDER — LOSARTAN POTASSIUM 25 MG/1
1 TABLET ORAL DAILY
COMMUNITY
End: 2023-08-21

## 2023-08-21 RX ORDER — ALENDRONATE SODIUM 70 MG/1
1 TABLET ORAL
COMMUNITY
End: 2023-08-21

## 2023-08-21 RX ORDER — OMEPRAZOLE 20 MG/1
TABLET, DELAYED RELEASE ORAL
COMMUNITY
End: 2023-08-21

## 2023-08-21 RX ORDER — BIOTIN 1000 MCG
1 TABLET,CHEWABLE ORAL
COMMUNITY
End: 2023-08-21

## 2023-08-21 ASSESSMENT — FIBROSIS 4 INDEX: FIB4 SCORE: 0.92

## 2023-08-21 NOTE — PROGRESS NOTES
Chief Complaint: Follow up for Hypercalcemia from Primary Hyperparathyroidism s/p parathyroidectomy    HPI:     Yadira Suarez is a 52 y.o. female here for follow up of hypercalcemia from primary hyperparathyroidism status post parathyroid surgery in 2019 at an outside facility.    She has a history of left-sided hemifacial spasm and originally her other providers thought that this was related to her parathyroid but obviously it is not as her left hemifacial spasm has persisted despite resolution of her primary hyperparathyroidism and hypercalcemia    She had a surgical complication with a previous dental surgery and this could be the underlying cause of her left hemifacial spasm and she has been referred to Dr. Del Cid who then referred her to Dr. Schumacher, neurology      She does have osteoporosis based upon the bone density on June 7, 2022 showing the lowest T score of -3.4 for the left forearm  Risk factors include the hyperparathyroidism and early surgical menopause          She is now on Alendronate 70mg weekly (started early 2022)  She reports upset stomach once  in a while but is tolerating the medication so far  Her most recent labs show that her calcium and PTH levels are normal  She is not taking calcium   She is taking vitamin D3 2000IU daily  She exercises daily       Latest Reference Range & Units 07/21/23 09:56   Sodium 135 - 145 mmol/L 143   Potassium 3.6 - 5.5 mmol/L 3.9   Chloride 96 - 112 mmol/L 106   Co2 20 - 33 mmol/L 23   Anion Gap 7.0 - 16.0  14.0   Glucose 65 - 99 mg/dL 101 (H)   Bun 8 - 22 mg/dL 20   Creatinine 0.50 - 1.40 mg/dL 0.87   GFR (CKD-EPI) >60 mL/min/1.73 m 2 80   Calcium 8.5 - 10.5 mg/dL 9.3   Correct Calcium 8.5 - 10.5 mg/dL 9.0   AST(SGOT) 12 - 45 U/L 19   ALT(SGPT) 2 - 50 U/L 22   Alkaline Phosphatase 30 - 99 U/L 90   Total Bilirubin 0.1 - 1.5 mg/dL 0.7   Albumin 3.2 - 4.9 g/dL 4.4   Total Protein 6.0 - 8.2 g/dL 7.2   Globulin 1.9 - 3.5 g/dL 2.8   A-G Ratio g/dL 1.6   Phosphorus  2.5 - 4.5 mg/dL 2.7        Latest Reference Range & Units 07/21/23 09:56   25-Hydroxy   Vitamin D 25 30 - 100 ng/mL 40   Cortisol 0.0 - 23.0 ug/dL 15.2   Pth, Intact 14.0 - 72.0 pg/mL 36.1      Latest Reference Range & Units 01/16/23 12:36   TSH 0.380 - 5.330 uIU/mL 1.290   Free T-4 0.93 - 1.70 ng/dL 1.16         Patient's medications, allergies, and social histories were reviewed and updated as appropriate.      ROS:       CONS:     No fever, no chills   EYES:     No diplopia, no blurry vision   CV:           No chest pain, no palpitations   PULM:     No SOB, no cough, no hemoptysis.   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  2022-04: Osteopenia  2022-04: S/P parathyroidectomy ([facility])  2019-01: Hyperparathyroidism ([facility])  2018-10: Hemifacial spasm  2014-12: Dyslipidemia  2013-12: Elective procedure for unacceptable cosmetic appearance  2013-10: Preventative health care  2012-09: S/P laparoscopic cholecystectomy  2012-09: Abdominal pain, other specified site  2012-09: S/P laparoscopic cholecystectomy  2012-09: Bradycardia  2012-09: Nausea & vomiting  2012-09: Elevated brain natriuretic peptide (BNP) level  2011-04: Essential hypertension  2011-01: S/P ERICA (total abdominal hysterectomy)  2011-01: Vitamin D deficiency  2011-01: History of vertigo  2011-01: Microscopic hematuria      Past Surgical History:  Past Surgical History:   Procedure Laterality Date    PARATHYROIDECTOMY Right 05/02/2019    ABDOMINOPLASTY  12/11/2013    Performed by Sherrie Knott M.D. at SURGERY [facility]    FAITH BY LAPAROSCOPY  9/27/2012    Performed by Jana Henriquez M.D. at SURGERY [facility]    PELVISCOPY   "9/16/2010    Performed by LALY FALLON at SURGERY [facility]    ABDOMINAL HYSTERECTOMY TOTAL  2006    fibroid removal         Allergies:  Pork allergy     Social History:  Social History     Tobacco Use    Smoking status: Never    Smokeless tobacco: Never   Vaping Use    Vaping Use: Never used   Substance Use Topics    Alcohol use: Yes     Comment: glass of wine every six months    Drug use: No        Family History:   family history includes Cancer in her father and sister; Diabetes in her mother; Heart Disease in her brother and father; Hypertension in her father.      PHYSICAL EXAM:   Vital signs: /74 (BP Location: Left arm, Patient Position: Sitting, BP Cuff Size: Adult)   Pulse 96   Resp 20   Ht 1.549 m (5' 1\")   Wt 80.2 kg (176 lb 14.4 oz)   SpO2 98%   BMI 33.42 kg/m²   GENERAL: Well-developed, well-nourished in no apparent distress.   EYE:  No ocular asymmetry, PERRLA  HENT: Pink, moist mucous membranes.    NECK: No thyromegaly.   CARDIOVASCULAR:  No murmurs  LUNGS: Clear breath sounds  ABDOMEN: Soft, nontender   EXTREMITIES: No clubbing, cyanosis, or edema.   NEUROLOGICAL: No gross focal motor abnormalities   LYMPH: No cervical adenopathy palpated.   SKIN: No rashes, lesions.       Labs:  Lab Results   Component Value Date/Time    WBC 5.7 04/12/2023 11:35 AM    WBC 5.1 02/04/2011 07:47 AM    RBC 4.52 04/12/2023 11:35 AM    RBC 4.46 02/04/2011 07:47 AM    HEMOGLOBIN 14.4 04/12/2023 11:35 AM    MCV 92.3 04/12/2023 11:35 AM    MCV 96 02/04/2011 07:47 AM    MCH 31.9 04/12/2023 11:35 AM    MCH 32.1 02/04/2011 07:47 AM    MCHC 34.5 04/12/2023 11:35 AM    RDW 42.6 04/12/2023 11:35 AM    RDW 13.2 02/04/2011 07:47 AM    MPV 9.8 04/12/2023 11:35 AM       Lab Results   Component Value Date/Time    SODIUM 143 07/21/2023 09:56 AM    POTASSIUM 3.9 07/21/2023 09:56 AM    CHLORIDE 106 07/21/2023 09:56 AM    CO2 23 07/21/2023 09:56 AM    ANION 14.0 07/21/2023 09:56 AM    GLUCOSE 101 (H) 07/21/2023 " 09:56 AM    BUN 20 2023 09:56 AM    CREATININE 0.87 2023 09:56 AM    CREATININE 0.91 2011 07:47 AM    CALCIUM 9.3 2023 09:56 AM    ASTSGOT 19 2023 09:56 AM    ALTSGPT 22 2023 09:56 AM    TBILIRUBIN 0.7 2023 09:56 AM    ALBUMIN 4.4 2023 09:56 AM    ALBUMIN 4.40 2023 12:36 PM    TOTPROTEIN 7.2 2023 09:56 AM    TOTPROTEIN 7.0 2023 12:36 PM    GLOBULIN 2.8 2023 09:56 AM    AGRATIO 1.6 2023 09:56 AM       Lab Results   Component Value Date/Time    TSHULTRASEN 1.290 2023 1236     No results found for: FREEDIR  No results found for: FREET3  No results found for: THYSTIMIG      Imagin2022 3:41 PM     HISTORY/REASON FOR EXAM:  Follow up for osteopenia L1-L4 levels, previous DEXA 3/2019 at [place] (see Care Everywhere), hysterectomy, adult bone fracture, hyperparathyroidism, hypertension.     TECHNIQUE/EXAM DESCRIPTION AND NUMBER OF VIEWS:  Dual x-ray bone densitometry was performed from the L1 through L4 levels and from the proximal left femur and distal left forearm utilizing the GE Prodigy unit.     COMPARISON: Bone densitometry scan 3/26/2019 at [place].     FINDINGS:  The lumbar spine has a mean bone mineral density of 1.019 g/cm2, with a T score of -1.3 and a Z score of -1.4.     The proximal left femur has a mean bone mineral density of 0.919 g/cm2, with a T score of -0.7 and a Z score of -0.6.     The distal left forearm has a mean bone mineral density of 0.578 g/cm2, with a T score of -3.4 and a Z score of -3.3.     When compared with the most recent study dated 3/26/2019, there has been a 1.7% decrease in the bone mineral density of the proximal left femur and a 4.0% increase in the bone mineral density of the distal left forearm and a 2.8% decrease in the bone   mineral density of the lumbar spine.     IMPRESSION:     According to the World Health Organization classification, bone mineral density of this patient is osteopenic  in the lumbar spine, osteoporotic in the left forearm and normal in the left femur.     10-year Probability of Fracture:  Major Osteoporotic     7.4%  Hip     0.4%  Population      [place] ()     Based on left femur neck BMD     INTERPRETING LOCATION: [address] [place], [place] [address], [address]      ASSESSMENT/PLAN:       1. Age-related osteoporosis without current pathological fracture  Stable  No interval fall or fracture   Continue alendronate 70 mg weekly  Reviewed side effects and alternatives  She should notify me if she has reactions to the medication  Discussed the importance of regular weightbearing exercise  Discussed the importance of good dental hygiene and regular dental visits  Discussed the importance of adequate calcium and vitamin supplementation  Reviewed fall precautions  She should notify me if she has any falls or fractures  I am ordering a DEXA  Follow-up in 7 months with labs      2. S/P parathyroidectomy (HCC)  Durable cure post parathyroidectomy continue monitoring    3. Vitamin D deficiency  Controlled   Continue vitamin D3 2000 IU daily continue monitoring        Return in about 7 months (around 3/21/2024).        Thank you kindly for allowing me to participate in the endocrine care plan for this patient.    Eran Regalado MD, FACE, [facility]      CC:   KISHOR Castorena

## 2023-09-01 ENCOUNTER — TELEPHONE (OUTPATIENT)
Dept: MEDICAL GROUP | Facility: LAB | Age: 53
End: 2023-09-01

## 2023-09-01 ENCOUNTER — TELEMEDICINE (OUTPATIENT)
Dept: MEDICAL GROUP | Facility: LAB | Age: 53
End: 2023-09-01
Payer: COMMERCIAL

## 2023-09-01 VITALS
SYSTOLIC BLOOD PRESSURE: 138 MMHG | DIASTOLIC BLOOD PRESSURE: 89 MMHG | BODY MASS INDEX: 32.55 KG/M2 | HEART RATE: 60 BPM | WEIGHT: 172.4 LBS | HEIGHT: 61 IN

## 2023-09-01 DIAGNOSIS — E66.9 OBESITY (BMI 30-39.9): ICD-10-CM

## 2023-09-01 PROCEDURE — 99213 OFFICE O/P EST LOW 20 MIN: CPT | Mod: 95 | Performed by: STUDENT IN AN ORGANIZED HEALTH CARE EDUCATION/TRAINING PROGRAM

## 2023-09-01 RX ORDER — PHENTERMINE HYDROCHLORIDE 37.5 MG/1
37.5 TABLET ORAL
Qty: 20 TABLET | Refills: 0 | Status: SHIPPED | OUTPATIENT
Start: 2023-09-01 | End: 2023-09-04 | Stop reason: SDUPTHER

## 2023-09-01 RX ORDER — PHENTERMINE HYDROCHLORIDE 37.5 MG/1
37.5 CAPSULE ORAL EVERY MORNING
Qty: 30 CAPSULE | Refills: 0 | Status: SHIPPED | OUTPATIENT
Start: 2023-09-22 | End: 2023-10-17 | Stop reason: SDUPTHER

## 2023-09-01 ASSESSMENT — FIBROSIS 4 INDEX: FIB4 SCORE: 0.92

## 2023-09-01 ASSESSMENT — ENCOUNTER SYMPTOMS
FEVER: 0
CHILLS: 0
SHORTNESS OF BREATH: 0

## 2023-09-01 NOTE — ASSESSMENT & PLAN NOTE
Chronic-not improving  - Increase phentermine to 37.5 mg  - PDMP checked, controlled substance previously signed  - Discussed with patient making small changes to her exercise where she does this first followed by cardio

## 2023-09-01 NOTE — PROGRESS NOTES
Subjective:   This evaluation was conducted via Zoom using secure and encrypted videoconferencing technology. The patient was in their home in the [facility].    The patient's identity was confirmed and verbal consent was obtained for this virtual visit.    CC: weight    HPI:   ORA presents today for the following;    Problem   Obesity (Bmi 30-39.9)    -patient over the past 2 years has gained 48 pounds   -due her leg condition she has not been able to exercise as she used.   -currently around 180lbs     -tries to eat healthy     7/21/23  -original weight: 180lbs with clothes, BMI 34.16  -current weight: 172lbs with clothes, BMI 32.  -patient lost 8lbs   -patient was started on phent 15mg previously, she only complains of tolerable dry mouth  -patient is also consuming foods with prebiotics   -patient is also walking    9/1/23  -original weight: 180lbs with clothes, BMI 34.16  -current weight: 172lbs with clothes, BMI 32.  -she works out 3x/week. She does 20 minutes of cardio followed by weights after with alternating with lower body and upper body          Current Outpatient Medications Ordered in Epic   Medication Sig Dispense Refill    phentermine (ADIPEX-P) 37.5 MG tablet Take 1 Tablet by mouth every morning before breakfast for 20 days. 20 Tablet 0    [START ON 9/22/2023] phentermine 37.5 MG capsule Take 1 Capsule by mouth every morning for 30 days. 30 Capsule 0    MAGNESIUM GLYCINATE PO       alendronate (FOSAMAX) 70 MG Tab Take 1 Tablet by mouth every 7 days. 12 Tablet 3    Cholecalciferol (VITAMIN D) 2000 UNIT Tab Take 2,000 Units by mouth every day.      losartan (COZAAR) 25 MG Tab Take 25 mg by mouth every day. FOR 90 DAYS      omeprazole (PRILOSEC) 20 MG delayed-release capsule Take 20 mg by mouth every day.      vitamin e (VITAMIN E) 400 UNIT Cap vitamin E 400 unit capsule   Take 1 capsule every day by oral route.      Multiple Vitamin (MULTIVITAMIN PO) Take 1 Tab by mouth every day.       No  "current [facility]-ordered facility-administered medications on file.           ROS:  Review of Systems   Constitutional:  Negative for chills and fever.   Respiratory:  Negative for shortness of breath.    Cardiovascular:  Negative for chest pain.       Objective:     Exam:  /89   Pulse 60   Ht 1.549 m (5' 1\")   Wt 78.2 kg (172 lb 6.4 oz)   BMI 32.57 kg/m²  Body mass index is 32.57 kg/m².    Physical Exam  Constitutional:       General: She is not in acute distress.     Appearance: She is not ill-appearing.   Pulmonary:      Effort: Pulmonary effort is normal.   Neurological:      Mental Status: She is alert.   Psychiatric:         Mood and Affect: Mood normal.         Behavior: Behavior normal.         Thought Content: Thought content normal.         Judgment: Judgment normal.               Assessment & Plan:     Problem List Items Addressed This Visit       Obesity (BMI 30-39.9)     Chronic-not improving  - Increase phentermine to 37.5 mg  - PDMP checked, controlled substance previously signed  - Discussed with patient making small changes to her exercise where she does this first followed by cardio         Relevant Medications    phentermine (ADIPEX-P) 37.5 MG tablet    phentermine 37.5 MG capsule (Start on 9/22/2023)         1 month follow up okay    Please note that this dictation was created using voice recognition software. I have made every reasonable attempt to correct obvious errors, but I expect that there are errors of grammar and possibly content that I did not discover before finalizing the note.        "

## 2023-09-01 NOTE — TELEPHONE ENCOUNTER
[facility] asking for clarification:  Phentermine, is patient taking both 3735 MG tablets and 37.5 MG capsules?

## 2023-09-04 DIAGNOSIS — E66.9 OBESITY (BMI 30-39.9): ICD-10-CM

## 2023-09-06 RX ORDER — PHENTERMINE HYDROCHLORIDE 37.5 MG/1
37.5 TABLET ORAL
Qty: 20 TABLET | Refills: 0 | Status: SHIPPED | OUTPATIENT
Start: 2023-09-06 | End: 2023-09-26

## 2023-10-17 ENCOUNTER — TELEMEDICINE (OUTPATIENT)
Dept: MEDICAL GROUP | Facility: LAB | Age: 53
End: 2023-10-17
Payer: COMMERCIAL

## 2023-10-17 ENCOUNTER — HOSPITAL ENCOUNTER (OUTPATIENT)
Dept: RADIOLOGY | Facility: MEDICAL CENTER | Age: 53
End: 2023-10-17
Attending: INTERNAL MEDICINE
Payer: COMMERCIAL

## 2023-10-17 VITALS
HEIGHT: 61 IN | HEART RATE: 76 BPM | RESPIRATION RATE: 16 BRPM | BODY MASS INDEX: 31.49 KG/M2 | SYSTOLIC BLOOD PRESSURE: 132 MMHG | TEMPERATURE: 97.3 F | OXYGEN SATURATION: 98 % | WEIGHT: 166.8 LBS | DIASTOLIC BLOOD PRESSURE: 88 MMHG

## 2023-10-17 DIAGNOSIS — Z12.11 COLON CANCER SCREENING: ICD-10-CM

## 2023-10-17 DIAGNOSIS — E66.9 OBESITY (BMI 30-39.9): ICD-10-CM

## 2023-10-17 DIAGNOSIS — M81.0 AGE-RELATED OSTEOPOROSIS WITHOUT CURRENT PATHOLOGICAL FRACTURE: ICD-10-CM

## 2023-10-17 PROCEDURE — 77080 DXA BONE DENSITY AXIAL: CPT

## 2023-10-17 PROCEDURE — 99213 OFFICE O/P EST LOW 20 MIN: CPT | Mod: 95 | Performed by: STUDENT IN AN ORGANIZED HEALTH CARE EDUCATION/TRAINING PROGRAM

## 2023-10-17 RX ORDER — PHENTERMINE HYDROCHLORIDE 37.5 MG/1
37.5 CAPSULE ORAL EVERY MORNING
Qty: 30 CAPSULE | Refills: 0 | Status: SHIPPED | OUTPATIENT
Start: 2023-10-17 | End: 2023-11-14 | Stop reason: SDUPTHER

## 2023-10-17 ASSESSMENT — ENCOUNTER SYMPTOMS
FEVER: 0
SHORTNESS OF BREATH: 0
CHILLS: 0

## 2023-10-17 ASSESSMENT — FIBROSIS 4 INDEX: FIB4 SCORE: 0.92

## 2023-10-17 NOTE — ASSESSMENT & PLAN NOTE
Chronic-improving  - Continue phentermine 37.5 mg  - PDMP checked, controlled substance previously signed  - Continue positive lifestyle modifications with exercise and diet  -Patient will be off phentermine for 1 month and we will follow-up and see how she is doing with her weight afterwards

## 2023-10-17 NOTE — PROGRESS NOTES
Subjective:   This evaluation was conducted via Zoom using secure and encrypted videoconferencing technology. The patient was in a private location in the state of [place].    The patient's identity was confirmed and verbal consent was obtained for this virtual visit.    CC: follow up    HPI:   ORA presents today for the following;    Problem   Obesity (Bmi 30-39.9)    -patient over the past 2 years has gained 48 pounds   -due her leg condition she has not been able to exercise as she used.   -currently around 180lbs     -tries to eat healthy     7/21/23  -original weight: 180lbs with clothes, BMI 34.16  -current weight: 172lbs with clothes, BMI 32.  -patient lost 8lbs   -patient was started on phent 15mg previously, she only complains of tolerable dry mouth  -patient is also consuming foods with prebiotics   -patient is also walking    9/1/23  -original weight: 180lbs with clothes, BMI 34.16  -current weight: 172lbs with clothes, BMI 32.  -she works out 3x/week. She does 20 minutes of cardio followed by weights after with alternating with lower body and upper body     10/17/23  -original weight: 180lbs with clothes, BMI 34.16  -current weight: 166.8lbs without clothes, BMI 31.52  -patient has lost roughly 5 lbs   -patient is staying active, she was traveling for the past couple of weeks but was hiking quite bit            Current Outpatient Medications Ordered in Epic   Medication Sig Dispense Refill    phentermine 37.5 MG capsule Take 1 Capsule by mouth every morning for 30 days. 30 Capsule 0    MAGNESIUM GLYCINATE PO       alendronate (FOSAMAX) 70 MG Tab Take 1 Tablet by mouth every 7 days. 12 Tablet 3    Cholecalciferol (VITAMIN D) 2000 UNIT Tab Take 2,000 Units by mouth every day.      losartan (COZAAR) 25 MG Tab Take 25 mg by mouth every day. FOR 90 DAYS      omeprazole (PRILOSEC) 20 MG delayed-release capsule Take 20 mg by mouth every day.      vitamin e (VITAMIN E) 400 UNIT Cap vitamin E 400 unit  "capsule   Take 1 capsule every day by oral route.      Multiple Vitamin (MULTIVITAMIN PO) Take 1 Tab by mouth every day.       No current [facility]-ordered facility-administered medications on file.           ROS:  Review of Systems   Constitutional:  Negative for chills and fever.   Respiratory:  Negative for shortness of breath.    Cardiovascular:  Negative for chest pain.       Objective:     Exam:  /88   Pulse 76   Temp 36.3 °C (97.3 °F)   Resp 16   Ht 1.549 m (5' 1\")   Wt 75.7 kg (166 lb 12.8 oz)   SpO2 98%   BMI 31.52 kg/m²  Body mass index is 31.52 kg/m².    Physical Exam  Constitutional:       General: She is not in acute distress.     Appearance: She is not ill-appearing.   Pulmonary:      Effort: Pulmonary effort is normal.   Neurological:      Mental Status: She is alert.   Psychiatric:         Mood and Affect: Mood normal.         Behavior: Behavior normal.         Thought Content: Thought content normal.         Judgment: Judgment normal.               Assessment & Plan:     Problem List Items Addressed This Visit       Obesity (BMI 30-39.9)     Chronic-improving  - Continue phentermine 37.5 mg  - PDMP checked, controlled substance previously signed  - Continue positive lifestyle modifications with exercise and diet  -Patient will be off phentermine for 1 month and we will follow-up and see how she is doing with her weight afterwards         Relevant Medications    phentermine 37.5 MG capsule     Other Visit Diagnoses       Colon cancer screening        Relevant Orders    Referral to GI for Colonoscopy            Follow up in 2 months for weight check     Please note that this dictation was created using voice recognition software. I have made every reasonable attempt to correct obvious errors, but I expect that there are errors of grammar and possibly content that I did not discover before finalizing the note.        "

## 2023-11-14 DIAGNOSIS — M81.0 AGE-RELATED OSTEOPOROSIS WITHOUT CURRENT PATHOLOGICAL FRACTURE: ICD-10-CM

## 2023-11-14 DIAGNOSIS — E66.9 OBESITY (BMI 30-39.9): ICD-10-CM

## 2023-11-15 RX ORDER — LOSARTAN POTASSIUM 25 MG/1
25 TABLET ORAL DAILY
Qty: 90 TABLET | Refills: 1 | Status: ON HOLD | OUTPATIENT
Start: 2023-11-15 | End: 2024-02-25

## 2023-11-15 RX ORDER — PHENTERMINE HYDROCHLORIDE 37.5 MG/1
37.5 CAPSULE ORAL EVERY MORNING
Qty: 30 CAPSULE | Refills: 0 | Status: SHIPPED | OUTPATIENT
Start: 2023-11-15 | End: 2023-12-06

## 2023-11-15 RX ORDER — ALENDRONATE SODIUM 70 MG/1
70 TABLET ORAL
Qty: 12 TABLET | Refills: 3 | Status: SHIPPED | OUTPATIENT
Start: 2023-11-15

## 2023-11-15 NOTE — TELEPHONE ENCOUNTER
Received request via: Patient    Was the patient seen in the last year in this department? Yes  10/17/23  Does the patient have an active prescription (recently filled or refills available) for medication(s) requested? No    Does the patient have [facility] living [facility] and need 100 day supply (blood pressure, diabetes and cholesterol meds only)?

## 2023-11-15 NOTE — TELEPHONE ENCOUNTER
Received request via: Pharmacy    Was the patient seen in the last year in this department? Yes  10/17/23  Does the patient have an active prescription (recently filled or refills available) for medication(s) requested? No    Does the patient have [facility] and need 100 day supply (blood pressure, diabetes and cholesterol meds only)? Medication is not for cholesterol, blood pressure or diabetes

## 2023-11-21 ENCOUNTER — EH NON-PROVIDER (OUTPATIENT)
Dept: OCCUPATIONAL MEDICINE | Facility: CLINIC | Age: 53
End: 2023-11-21

## 2023-11-21 DIAGNOSIS — Z02.89 ENCOUNTER FOR OCCUPATIONAL HEALTH EXAMINATION INVOLVING RESPIRATOR: ICD-10-CM

## 2023-11-21 PROCEDURE — 94375 RESPIRATORY FLOW VOLUME LOOP: CPT | Performed by: PREVENTIVE MEDICINE

## 2023-12-01 DIAGNOSIS — M62.81 MUSCLE WEAKNESS: ICD-10-CM

## 2023-12-06 ENCOUNTER — TELEMEDICINE (OUTPATIENT)
Dept: MEDICAL GROUP | Facility: LAB | Age: 53
End: 2023-12-06
Payer: COMMERCIAL

## 2023-12-06 VITALS
HEIGHT: 61 IN | HEART RATE: 81 BPM | SYSTOLIC BLOOD PRESSURE: 147 MMHG | TEMPERATURE: 97 F | RESPIRATION RATE: 16 BRPM | DIASTOLIC BLOOD PRESSURE: 93 MMHG | WEIGHT: 163.8 LBS | BODY MASS INDEX: 30.93 KG/M2

## 2023-12-06 DIAGNOSIS — I10 ESSENTIAL HYPERTENSION: ICD-10-CM

## 2023-12-06 DIAGNOSIS — E66.9 OBESITY (BMI 30-39.9): ICD-10-CM

## 2023-12-06 PROCEDURE — 99213 OFFICE O/P EST LOW 20 MIN: CPT | Mod: 95 | Performed by: STUDENT IN AN ORGANIZED HEALTH CARE EDUCATION/TRAINING PROGRAM

## 2023-12-06 ASSESSMENT — ENCOUNTER SYMPTOMS
ABDOMINAL PAIN: 0
PALPITATIONS: 0
SPUTUM PRODUCTION: 0
CHILLS: 0
VOMITING: 0
FEVER: 0
BLOOD IN STOOL: 0
COUGH: 0

## 2023-12-06 ASSESSMENT — FIBROSIS 4 INDEX: FIB4 SCORE: 0.92

## 2023-12-06 NOTE — PROGRESS NOTES
Subjective:     CC: weight check    HPI:   ORA presents today for the following;    Problem   Obesity (Bmi 30-39.9)    -patient over the past 2 years has gained 48 pounds   -due her leg condition she has not been able to exercise as she used.   -currently around 180lbs     -tries to eat healthy     7/21/23  -original weight: 180lbs with clothes, BMI 34.16  -current weight: 172lbs with clothes, BMI 32.  -patient lost 8lbs   -patient was started on phent 15mg previously, she only complains of tolerable dry mouth  -patient is also consuming foods with prebiotics   -patient is also walking    9/1/23  -original weight: 180lbs with clothes, BMI 34.16  -current weight: 172lbs with clothes, BMI 32.  -she works out 3x/week. She does 20 minutes of cardio followed by weights after with alternating with lower body and upper body     10/17/23  -original weight: 180lbs with clothes, BMI 34.16  -current weight: 166.8lbs without clothes, BMI 31.52  -patient has lost roughly 5 lbs   -patient is staying active, she was traveling for the past couple of weeks but was hiking quite bit       12/6/23  -Original weight was close 180 pounds  - Current weight without clothes 163 pounds  -Patient has been off phentermine for 1 month  - She is been unable to exercise for the past 1 week due to being busy with work  - She is starting to count her macros and is shifting her diet     Essential Hypertension    BP is controlled with losartan 25mg          Current Outpatient Medications Ordered in Epic   Medication Sig Dispense Refill    alendronate (FOSAMAX) 70 MG Tab Take 1 Tablet by mouth every 7 days. 12 Tablet 3    phentermine 37.5 MG capsule Take 1 Capsule by mouth every morning for 30 days. 30 Capsule 0    losartan (COZAAR) 25 MG Tab Take 1 Tablet by mouth every day. FOR 90 DAYS 90 Tablet 1    MAGNESIUM GLYCINATE PO       Cholecalciferol (VITAMIN D) 2000 UNIT Tab Take 2,000 Units by mouth every day.      omeprazole (PRILOSEC) 20 MG  "delayed-release capsule Take 20 mg by mouth every day.      vitamin e (VITAMIN E) 400 UNIT Cap vitamin E 400 unit capsule   Take 1 capsule every day by oral route.      Multiple Vitamin (MULTIVITAMIN PO) Take 1 Tab by mouth every day.       No current [facility]-ordered facility-administered medications on file.           ROS:  Review of Systems   Constitutional:  Negative for chills and fever.   Respiratory:  Negative for cough and sputum production.    Cardiovascular:  Negative for chest pain and palpitations.   Gastrointestinal:  Negative for abdominal pain, blood in stool and vomiting.       Objective:     Exam:  BP (!) 147/93   Pulse 81   Temp 36.1 °C (97 °F)   Resp 16   Ht 1.549 m (5' 1\")   Wt 74.3 kg (163 lb 12.8 oz)   BMI 30.95 kg/m²  Body mass index is 30.95 kg/m².    Physical Exam  Constitutional:       General: She is not in acute distress.     Appearance: She is not ill-appearing.   Pulmonary:      Effort: Pulmonary effort is normal.   Neurological:      Mental Status: She is alert.   Psychiatric:         Mood and Affect: Mood normal.         Behavior: Behavior normal.         Thought Content: Thought content normal.         Judgment: Judgment normal.               Assessment & Plan:     Problem List Items Addressed This Visit       Essential hypertension     Chronic  - Continue losartan 25 mg         Obesity (BMI 30-39.9)     Chronic-improving  - Continue to hold off of phentermine  - Patient will continue positive lifestyle modifications  - I stressed to patient importance of physical exercise            Patient's bp elevated today due to rushing to make the appointment.        Please note that this dictation was created using voice recognition software. I have made every reasonable attempt to correct obvious errors, but I expect that there are errors of grammar and possibly content that I did not discover before finalizing the note.        "

## 2023-12-06 NOTE — ASSESSMENT & PLAN NOTE
Chronic-improving  - Continue to hold off of phentermine  - Patient will continue positive lifestyle modifications  - I stressed to patient importance of physical exercise

## 2023-12-31 ENCOUNTER — OFFICE VISIT (OUTPATIENT)
Dept: URGENT CARE | Facility: PHYSICIAN GROUP | Age: 53
End: 2023-12-31
Payer: COMMERCIAL

## 2023-12-31 VITALS
WEIGHT: 166 LBS | SYSTOLIC BLOOD PRESSURE: 138 MMHG | TEMPERATURE: 97.3 F | OXYGEN SATURATION: 99 % | DIASTOLIC BLOOD PRESSURE: 84 MMHG | BODY MASS INDEX: 31.34 KG/M2 | HEIGHT: 61 IN | RESPIRATION RATE: 18 BRPM | HEART RATE: 76 BPM

## 2023-12-31 DIAGNOSIS — J06.9 VIRAL URI WITH COUGH: ICD-10-CM

## 2023-12-31 LAB
FLUAV RNA SPEC QL NAA+PROBE: NEGATIVE
FLUBV RNA SPEC QL NAA+PROBE: NEGATIVE
RSV RNA SPEC QL NAA+PROBE: NEGATIVE
SARS-COV-2 RNA RESP QL NAA+PROBE: NEGATIVE

## 2023-12-31 PROCEDURE — 3079F DIAST BP 80-89 MM HG: CPT | Performed by: FAMILY MEDICINE

## 2023-12-31 PROCEDURE — 0241U POCT CEPHEID COV-2, FLU A/B, RSV - PCR: CPT | Performed by: FAMILY MEDICINE

## 2023-12-31 PROCEDURE — 3075F SYST BP GE 130 - 139MM HG: CPT | Performed by: FAMILY MEDICINE

## 2023-12-31 PROCEDURE — 99213 OFFICE O/P EST LOW 20 MIN: CPT | Performed by: FAMILY MEDICINE

## 2023-12-31 ASSESSMENT — ENCOUNTER SYMPTOMS
COUGH: 1
SORE THROAT: 1

## 2023-12-31 ASSESSMENT — FIBROSIS 4 INDEX: FIB4 SCORE: 0.92

## 2023-12-31 NOTE — PROGRESS NOTES
Subjective:     ORA LORENZ is a 53 y.o. female who presents for Coronavirus Screening (Would like testing done. 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    Respiratory:  Positive for cough.        PMH:  has a past medical history of Anesthesia, Infectious disease, Other specified symptom associated with female genital organs, Pain, and Unspecified disorder of thyroid.    She has no past medical history of Breast cancer (HCC).  MEDS:   Current Outpatient Medications:     alendronate (FOSAMAX) 70 MG Tab, Take 1 Tablet by mouth every 7 days., Disp: 12 Tablet, Rfl: 3    losartan (COZAAR) 25 MG Tab, Take 1 Tablet by mouth every day. FOR 90 DAYS, Disp: 90 Tablet, Rfl: 1    MAGNESIUM GLYCINATE PO, , Disp: , Rfl:     Cholecalciferol (VITAMIN D) 2000 UNIT Tab, Take 2,000 Units by mouth every day., Disp: , Rfl:     omeprazole (PRILOSEC) 20 MG delayed-release capsule, Take 20 mg by mouth every day., Disp: , Rfl:     vitamin e (VITAMIN E) 400 UNIT Cap, vitamin E 400 unit capsule  Take 1 capsule every day by oral route., Disp: , Rfl:     Multiple Vitamin (MULTIVITAMIN PO), Take 1 Tab by mouth every day., Disp: , Rfl:   ALLERGIES:   Allergies   Allergen Reactions    Pork Allergy Itching     itching  Other reaction(s): other  Other reaction(s): other     SURGHX:   Past Surgical History:   Procedure Laterality Date    PARATHYROIDECTOMY Right 05/02/2019    ABDOMINOPLASTY  12/11/2013    [facility]  9/[facility]/2012    Performed by  "Jana Henriquez M.D. at SUR[facility]S    PELVISCOPY  9/16/2010    Performed by LALY FALLON at SUR[facility]ERY [facility]    ABDOMINAL HYSTERECTOMY TOTAL  2006    fibroid removal      SOCHX:  reports that she has never smoked. She has never used smokeless tobacco. She reports current alcohol use. She reports that she does not use drugs.     Objective:   /84   Pulse 76   Temp 36.3 °C (97.3 °F) (Temporal)   Resp 18   Ht 1.549 m (5' 1\")   Wt 75.3 kg (166 lb)   SpO2 99%   BMI 31.37 kg/m²     Physical Exam  Constitutional:       General: She is not in acute distress.     Appearance: She is well-developed. She is not diaphoretic.   HENT:      Head: Normocephalic and atraumatic.      Right Ear: Tympanic membrane, ear canal and external ear normal.      Left Ear: Tympanic membrane, ear canal and external ear normal.      Nose: Congestion present.      Mouth/Throat:      Mouth: Mucous membranes are moist.      Pharynx: Oropharynx is clear. No oropharyngeal exudate or posterior oropharyngeal erythema.   Neck:      Trachea: No tracheal deviation.   Cardiovascular:      Rate and Rhythm: Normal rate and regular rhythm.   Pulmonary:      Effort: Pulmonary effort is normal. No respiratory distress.      Breath sounds: Normal breath sounds. No wheezing or rales.   Musculoskeletal:      Cervical back: Normal range of motion and neck supple.   Neurological:      Mental Status: She is alert.         Assessment/Plan:   Assessment    1. Viral URI with cough  - POCT CEPHEID COV-2, FLU A/B, RSV - PCR    Patient with viral URI.  Point-of-care COVID, influenza, RSV all negative.  Reviewed supportive care measures expected course recovery.  She is cleared to return to work whenever she is feeling up to it.  Recommended she wear a mask at work for the next few days.  Follow-up in the urgent care as needed.    "

## 2024-01-28 ENCOUNTER — APPOINTMENT (OUTPATIENT)
Dept: URGENT CARE | Facility: PHYSICIAN GROUP | Age: 54
End: 2024-01-28
Payer: COMMERCIAL

## 2024-01-28 ENCOUNTER — NON-PROVIDER VISIT (OUTPATIENT)
Dept: URGENT CARE | Facility: PHYSICIAN GROUP | Age: 54
End: 2024-01-28
Payer: COMMERCIAL

## 2024-01-28 ENCOUNTER — OCCUPATIONAL MEDICINE (OUTPATIENT)
Dept: URGENT CARE | Facility: PHYSICIAN GROUP | Age: 54
End: 2024-01-28
Payer: COMMERCIAL

## 2024-01-28 VITALS
OXYGEN SATURATION: 98 % | SYSTOLIC BLOOD PRESSURE: 138 MMHG | HEART RATE: 63 BPM | DIASTOLIC BLOOD PRESSURE: 80 MMHG | BODY MASS INDEX: 32.66 KG/M2 | RESPIRATION RATE: 18 BRPM | TEMPERATURE: 97.5 F | WEIGHT: 173 LBS | HEIGHT: 61 IN

## 2024-01-28 DIAGNOSIS — S39.012A STRAIN OF LUMBAR REGION, INITIAL ENCOUNTER: ICD-10-CM

## 2024-01-28 DIAGNOSIS — Z02.1 PRE-EMPLOYMENT DRUG SCREENING: ICD-10-CM

## 2024-01-28 DIAGNOSIS — S33.6XXA SACROILIAC SPRAIN, INITIAL ENCOUNTER: ICD-10-CM

## 2024-01-28 LAB
AMP AMPHETAMINE: NORMAL
BAR BARBITURATES: NORMAL
BREATH ALCOHOL COMMENT: NORMAL
BZO BENZODIAZEPINES: NORMAL
COC COCAINE: NORMAL
INT CON NEG: NEGATIVE
INT CON POS: POSITIVE
MDMA ECSTASY: NORMAL
MET METHAMPHETAMINES: NORMAL
MTD METHADONE: NORMAL
OPI OPIATES: NORMAL
OXY OXYCODONE: NORMAL
PCP PHENCYCLIDINE: NORMAL
POC BREATHALIZER: 0 PERCENT (ref 0–0.01)
POC URINE DRUG SCREEN OCDRS: NORMAL
THC: NORMAL

## 2024-01-28 PROCEDURE — 99213 OFFICE O/P EST LOW 20 MIN: CPT | Performed by: FAMILY MEDICINE

## 2024-01-28 PROCEDURE — 82075 ASSAY OF BREATH ETHANOL: CPT | Performed by: PHYSICIAN ASSISTANT

## 2024-01-28 PROCEDURE — 80305 DRUG TEST PRSMV DIR OPT OBS: CPT | Performed by: PHYSICIAN ASSISTANT

## 2024-01-28 PROCEDURE — 3075F SYST BP GE 130 - 139MM HG: CPT | Performed by: FAMILY MEDICINE

## 2024-01-28 PROCEDURE — 3079F DIAST BP 80-89 MM HG: CPT | Performed by: FAMILY MEDICINE

## 2024-01-28 RX ORDER — CYCLOBENZAPRINE HCL 10 MG
10 TABLET ORAL 3 TIMES DAILY PRN
Qty: 20 TABLET | Refills: 0 | Status: SHIPPED | OUTPATIENT
Start: 2024-01-28 | End: 2024-02-08

## 2024-01-28 ASSESSMENT — FIBROSIS 4 INDEX: FIB4 SCORE: 0.92

## 2024-01-28 ASSESSMENT — ENCOUNTER SYMPTOMS
FOCAL WEAKNESS: 0
SENSORY CHANGE: 0
NECK PAIN: 0

## 2024-01-28 NOTE — LETTER
[facility]  [address] [place] [address] [place] [address]  Phone:  [phone] - Fax:  [phone]   [facility] Progress Report and Disability Certification  Date of Service: 2024   No Show:  No  Date / Time of Next Visit: 2024   Claim Information   Patient Name: ORA LORENZ  Claim Number:     Employer: [employer]  Date of Injury: 2024     Insurer / TPA: [employer]  ID / SSN:     Occupation: Registered Nurse  Diagnosis: Diagnoses of Strain of lumbar region, initial encounter and Sacroiliac sprain, initial encounter were pertinent to this visit.    Medical Information   Related to Industrial Injury? Yes    Subjective Complaints:  DOI: 2024  Right low back injury. JUDY: repositioning patient with lifting and twisting motion.  Pain severity 2/10 currently. No radiation. Pain is worse 4/10 when standing from sitting position. Worse with sitting. Some improvement with tylenol and motrin. No fever. No PMH active cancer/weight stable. No prior injury or surgery to back. No second job or outside activity contributing.    Objective Findings: Back: tender and spasm right paralumbar region. Tender over SI joint. Pain reproduced with flexion at 45 degrees.  Neuro: Bilateral lower extremity strength and sensory intact.Negative straight leg raise. DTR 2+/4 bilateral knees     Pre-Existing Condition(s):     Assessment:   Initial Visit    Status: Additional Care Required  Permanent Disability:No    Plan:   Comments:ice, nsaid, flexeril as needed    Diagnostics:      Comments:       Disability Information   Status: Released to Restricted Duty    From:  2024  Through: 2024 Restrictions are: Temporary   Physical Restrictions   Sitting:  < or = to 2 hrs/day Standing:    [place] hrs/day [place] hrs/day   Squatting:    Walking:    Climbing:    Pushing:  < or = to 2 hrs/day   Pulling:  < or = to 2 hrs/day Other:    Reaching Above Shoulder (L):   Reaching  Above Shoulder (R):       Reaching Below Shoulder (L):    Reaching Below Shoulder (R):      Not to exceed Weight Limits   Carrying(hrs): 2 Weight Limit(lb): < or = to 10 pounds Lifting(hrs): 2 Weight  Limit(lb): < or = to 10 pounds   Comments:      Repetitive Actions   Hands: i.e. Fine Manipulations from Grasping:     Feet: i.e. Operating Foot Controls:     Driving / Operate Machinery:     Health Care Provider’s Original or Electronic Signature  Archie Means M.D. Health Care Provider’s Original or Electronic Signature    Lj Maldonado DO MPH     Clinic Name / Location: [address] [address] Clinic Phone Number: Dept: [phone]   Appointment Time: 10:30 Am Visit Start Time: 11:04 AM   Check-In Time:  11:02 Am Visit Discharge Time:  12:10 PM   Original-Treating Physician or Chiropractor    Page 2-Insurer/TPA    Page 3-Employer    Page 4-Employee

## 2024-01-28 NOTE — LETTER
"    EMPLOYEE’S CLAIM FOR COMPENSATION/ REPORT OF INITIAL TREATMENT  FORM C-4  PLEASE TYPE OR PRINT    EMPLOYEE’S CLAIM - PROVIDE ALL INFORMATION REQUESTED   First Name                    ANA PAYAN Last Name  KELECHI Birthdate                    1970                Sex  []M  []F Claim Number (Insurer’s Use Only)     Home Address  [address] Age  53 y.o. 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 (if applicable)  Repositioned a pt after    How did this injury or occupational disease occur? (Be specific and answer in detail. Use additional sheet if necessary)  Patient incontinent and needed to be turned then repositioned on the gurney. The 2nd RN had to leave due to another patient's bed alarm went off so twisted my back during reposition   If you believe that you have an occupational disease, when did you first have knowledge of the disability and its relationship to your employment?  N/a Witnesses to the Accident (if applicable)  N/a      Nature of Injury or Occupational Disease  Workers' " Compensation  Part(s) of Body Injured or Affected  Lower Back Area (Lumbar Area & Lumbo-Sacral) N/A N/A    I CERTIFY THAT THE ABOVE IS TRUE AND CORRECT TO T HE BEST OF MY KNOWLEDGE AND THAT I HAVE PROVIDED THIS INFORMATION IN ORDER TO OBTAIN THE BENEFITS OF [place], [place]).  I HEREBY AUTHORIZE ANY PHYSICIAN, CHIROPRACTOR, SURGEON, PRACTITIONER OR ANY OTHER PERSON, ANY HOSPITAL, INCLUDING [facility] OR [facility], ANY  MEDICAL SERVICE ORGANIZATION, ANY INSURANCE COMPANY, OR OTHER INSTITUTION OR ORGANIZATION TO RELEASE TO EACH OTHER, ANY MEDICAL OR OTHER INFORMATION, INCLUDING BENEFITS PAID OR PAYABLE, PERTINENT TO THIS INJURY OR DISEASE, EXCEPT INFORMATION RELATIVE TO DIAGNOSIS, TREATMENT AND/OR COUNSELING FOR AIDS, PSYCHOLOGICAL CONDITIONS, ALCOHOL OR CONTROLLED SUBSTANCES, FOR WHICH I MUST GIVE SPECIFIC AUTHORIZATION.  A PHOTOSTAT OF THIS AUTHORIZATION SHALL BE VALID AS THE ORIGINAL.     Date   Place Employee’s Original or  *Electronic Signature   THIS REPORT MUST BE COMPLETED AND MAILED WITHIN 3 WORKING DAYS OF TREATMENT   Place  [facility]    Name of [facility]   Date 1/28/2024 Diagnosis and Description of Injury or Occupational Disease  (S39.012A) Strain of lumbar region, initial encounter  (S33.6XXA) Sacroiliac sprain, initial encounter  Diagnoses of Strain of lumbar region, initial encounter and Sacroiliac sprain, initial encounter were pertinent to this visit. Is there evidence that the injured employee was under the influence of alcohol and/or another controlled substance at the time of accident?  []No  [] Yes (if yes, please explain)   Hour 11:04 AM  No   Treatment: Ice, otc nsaid, flexeril as needed    Have you advised the patient to remain off work five days or more?   [] Yes Indicate dates: From   To    []No If no, is the injured employee capable of: [] full duty  [] modified duty                                                             No  Yes  If modified duty, specify any limitations / restrictions:  No stooping or bending   Pushing, pulling, lifting, carrying <2 hours and <10#  Sitting <2 hours                                                                                                                                                                                                                                                                                                                                                                                                               X-Ray Findings:   Comments:NA    From information given by the employee, together with medical evidence, can you directly connect this injury or occupational disease as job incurred?  []Yes   [] No Yes    Is additional medical care by a physician indicated? []Yes [] No  Yes    Do you know of any previous injury or disease contributing to this condition or occupational disease? []Yes [] No (Explain if yes)                          No   Date  1/28/2024 Print Health Care Provider’s Name  Archie Means M.D. I certify that the employer’s copy of  this form was delivered to the employer on:   Address  [address] INSURER'S USE ONLY                       [facility] [place] Zip  [address] Provider’s Tax ID Number  [id number]   Telephone  Dept: [phone]    Health Care Provider’s Original or Electronic Signature  e-ARCHIE Álvarez M.D. Degree (MD,DO, DC,PA-C,APRN)  MD  Choose (if applicable)      ORIGINAL - TREATING HEALTHCARE PROVIDER PAGE 2 - INSURER/TPA PAGE 3 - EMPLOYER PAGE 4 - EMPLOYEE             Form C-4 (rev.08/23)        BRIEF DESCRIPTION OF RIGHTS AND BENEFITS  (Pursuant to [place] 616[place])    Notice of Injury or Occupational Disease (Incident Report Form C-1): If an injury or occupational disease (OD) arises out of and in the course of employment, you must provide  "written notice to your employer as soon as practicable, but no later than 7 days after the accident or OD. Your employer shall maintain a sufficient supply of the required forms.    Claim for Compensation (Form C-4): If medical treatment is sought, the form C-4 is available at the place of initial treatment. A completed \"Claim for Compensation\" (Form C-4) must be filed within 90 days after an accident or OD. The treating physician or chiropractor must, within 3 working days after treatment, complete and mail to the employer, the employer's insurer and third-party , the Claim for Compensation.    Medical Treatment: If you require medical treatment for your on-the-job injury or OD, you may be required to select a physician or chiropractor from a list provided by your workers’ compensation insurer, if it has contracted with an Organization for Managed Care (MCO) or Preferred Provider Organization (PPO) or providers of health care. If your employer has not entered into a contract with an MCO or PPO, you may select a physician or chiropractor from the Panel of Physicians and Chiropractors. Any medical costs related to your industrial injury or OD will be paid by your insurer.    Temporary Total Disability (TTD): If your doctor has certified that you are unable to work for a period of at least 5 consecutive days, or 5 cumulative days in a 20-day period, or places restrictions on you that your employer does not accommodate, you may be entitled to TTD compensation.    Temporary Partial Disability (TPD): If the wage you receive upon reemployment is less than the compensation for TTD to which you are entitled, the insurer may be required to pay you TPD compensation to make up the difference. TPD can only be paid for a maximum of 24 months.    Permanent Partial Disability (PPD): When your medical condition is stable and there is an indication of a PPD as a result of your injury or OD, within 30 days, your insurer " must arrange for an evaluation by a rating physician or chiropractor to determine the degree of your PPD. The amount of your PPD award depends on the date of injury, the results of the PPD evaluation, your age and wage.    Permanent Total Disability (PTD): If you are medically certified by a treating physician or chiropractor as permanently and totally disabled and have been granted a PTD status by your insurer, you are entitled to receive monthly benefits not to exceed 66 2/3% of your average monthly wage. The amount of your PTD payments is subject to reduction if you previously received a lump-sum PPD award.    Vocational Rehabilitation Services: You may be eligible for vocational rehabilitation services if you are unable to return to the job due to a permanent physical impairment or permanent restrictions as a result of your injury or occupational disease.    Transportation and Per Nima Reimbursement: You may be eligible for travel expenses and per nima associated with medical treatment.    Reopening: You may be able to reopen your claim if your condition worsens after claim closure.     Appeal Process: If you disagree with a written determination issued by the insurer or the insurer does not respond to your request, you may appeal to the Department of Administration, , by following the instructions contained in your determination letter. You must appeal the determination within 70 days from the date of the determination letter at [address], [address], [place], [place] [address], or [address] [facility] [address], [place] [address]. If you disagree with the  decision, you may appeal to the Department of Administration, . You must file your appeal within 30 days from the date of the  decision letter at [address], [address], [place] [address], or [address] [facility] [address], [place] [address]. If you  disagree with a decision of an , you may file a petition for judicial review with the District Court. You must do so within 30 days of the Appeal Officer’s decision. You may be represented by an  at your own expense or you may contact the [facility] for possible representation.    [place]  for Injured Workers (NAIW): If you disagree with a  decision, you may request that NAIW represent you without charge at an  Hearing. For information regarding denial of benefits, you may contact the [facility] at: [address], [address], [place] [address], [phone], or [address], [address], [place] [address], [phone]    To File a Complaint with the Division: If you wish to file a complaint with the  of the Division of Industrial Relations (DIR),  please contact the Workers’ Compensation Section, [address], [address], [place], [place] [address], telephone [phone], or [address], [place], [place] [address], telephone [phone].    For assistance with Workers’ Compensation Issues: You may contact the [facility] Office for Consumer Health Assistance, [address], [address], [place], [place] [address], Toll Free 1-362.742.8657, Web site: http://[facility].[place].gov/Programs/DEBRA E-mail: debra@[facility].nv.[facility]              __________________________________________________________________                                    _________________            Employee Name / Signature                                                                                                                            Date                                                                                                                                                                                                                              D-2 (rev. 10/20)

## 2024-01-28 NOTE — PROGRESS NOTES
"Subjective     Alexandra LORENZ is a 53 y.o. female who presents with Back Pain (Low back pain from lifting a pt off. Has been taking tylenol and IBU night and day. Pain while sitting or laying down . Has been trying to do stretches. )      DOI: 1/20/2024  Right low back injury. JUDY: repositioning patient with lifting and twisting motion.  Pain severity 2/10 currently. No radiation. Pain is worse 4/10 when standing from sitting position. Worse with sitting. Some improvement with tylenol and motrin. No fever. No PMH active cancer/weight stable. No prior injury or surgery to back. No second job or outside activity contributing.      HPI    Review of Systems   Musculoskeletal:  Negative for neck pain.   Neurological:  Negative for sensory change and focal weakness.              Objective     /80   Pulse 63   Temp 36.4 °C (97.5 °F) (Temporal)   Resp 18   Ht 1.549 m (5' 1\")   Wt 78.5 kg (173 lb)   SpO2 98%   BMI 32.69 kg/m²      Physical Exam  Constitutional:       Appearance: Normal appearance.   Skin:     General: Skin is warm and dry.   Neurological:      Mental Status: She is alert.         Back: tender and spasm right paralumbar region. Tender over SI joint. Pain reproduced with flexion at 45 degrees.  Neuro: Bilateral lower extremity strength and sensory intact.Negative straight leg raise. DTR 2+/4 bilateral knees                     Assessment & Plan        1. Strain of lumbar region, initial encounter    - cyclobenzaprine (FLEXERIL) 10 mg Tab; Take 1 Tablet by mouth 3 times a day as needed for Muscle Spasms.  Dispense: 20 Tablet; Refill: 0    2. Sacroiliac sprain, initial encounter    Work restrictions on D39  Continue ice and NSAID as needed.  Will add muscle relaxer.    Follow-up Friday to 2/2/24.              "

## 2024-01-31 ENCOUNTER — APPOINTMENT (OUTPATIENT)
Dept: MEDICAL GROUP | Facility: LAB | Age: 54
End: 2024-01-31
Payer: COMMERCIAL

## 2024-02-02 ENCOUNTER — OCCUPATIONAL MEDICINE (OUTPATIENT)
Dept: URGENT CARE | Facility: PHYSICIAN GROUP | Age: 54
End: 2024-02-02
Payer: COMMERCIAL

## 2024-02-02 VITALS
TEMPERATURE: 97.3 F | RESPIRATION RATE: 16 BRPM | BODY MASS INDEX: 32.66 KG/M2 | OXYGEN SATURATION: 97 % | SYSTOLIC BLOOD PRESSURE: 128 MMHG | HEIGHT: 61 IN | WEIGHT: 173 LBS | HEART RATE: 83 BPM | DIASTOLIC BLOOD PRESSURE: 76 MMHG

## 2024-02-02 DIAGNOSIS — S33.6XXD SACROILIAC SPRAIN, SUBSEQUENT ENCOUNTER: ICD-10-CM

## 2024-02-02 PROCEDURE — 3078F DIAST BP <80 MM HG: CPT | Performed by: NURSE PRACTITIONER

## 2024-02-02 PROCEDURE — 3074F SYST BP LT 130 MM HG: CPT | Performed by: NURSE PRACTITIONER

## 2024-02-02 PROCEDURE — 99213 OFFICE O/P EST LOW 20 MIN: CPT | Performed by: NURSE PRACTITIONER

## 2024-02-02 ASSESSMENT — FIBROSIS 4 INDEX: FIB4 SCORE: 0.92

## 2024-02-02 NOTE — LETTER
[address] [place] [address]  Phone:  [phone] - Fax:  [phone]   [facility] Progress Report and Disability Certification  Date of Service: 2/2/2024   No Show:  No  Date / Time of Next Visit:     Claim Information   Patient Name: ORA LORENZ  Claim Number:     Employer: [employer]  Date of Injury: 1/20/2024     Insurer / TPA: [employer]  ID / SSN:     Occupation: Registered Nurse  Diagnosis: The encounter diagnosis was Sacroiliac sprain, subsequent encounter.    Medical Information   Related to Industrial Injury? Yes    Subjective Complaints:  DOI: 1/20/2024  Right low back injury. JUDY: repositioning patient with lifting and twisting motion.  Pain severity 2/10 currently. No radiation. Pain is worse 4/10 when standing from sitting position. Worse with sitting. Some improvement with tylenol and motrin. No fever. No PMH active cancer/weight stable. No prior injury or surgery to back. No second job or outside activity contributing.      FV#1: 2/2/2024.  When he notes that her pain has improved since her previous visit.  She has used Flexeril twice and does continue to use Tiger balm, ibuprofen and Tylenol as needed.  She is performing her stretches every day.  She has not returned to work since her initial injury.  She is ready to return to work and trial full duty.   Objective Findings:  Lumbar back: Tenderness present. No bony tenderness. Negative right straight leg raise test and negative left straight leg raise test.      Comments: Range of motion has improved since previous visit.  She is able to twist and bend and reach past her knees.    Pre-Existing Condition(s):     Assessment:   Condition Improved    Status: Additional Care Required  Permanent Disability:No    Plan:      Diagnostics:      Comments:       Disability Information   Status: Released to Full Duty    From:     Through:   Restrictions are:     Physical Restrictions    Sitting:    Standing:    Stooping:    Bending:      Squatting:    Walking:    Climbing:    Pushing:      Pulling:    Other:    Reaching Above Shoulder (L):   Reaching Above Shoulder (R):       Reaching Below Shoulder (L):    Reaching Below Shoulder (R):      Not to exceed Weight Limits   Carrying(hrs):   Weight Limit(lb):   Lifting(hrs):   Weight  Limit(lb):     Comments: Patient was returned to work full max duty at this time.  She may follow-up in 7 days or sooner for worsening symptoms.  Continue with supportive treatment as needed.      Repetitive Actions   Hands: i.e. Fine Manipulations from Grasping:     Feet: i.e. Operating Foot Controls:     Driving / Operate Machinery:     Health Care Provider’s Original or Electronic Signature  JENY Ramsey Health Care Provider’s Original or Electronic Signature    Lj Maldonado DO MPH     Clinic Name / Location: [address] [address] Clinic Phone Number: Dept: [phone]   Appointment Time: 10:00 Am Visit Start Time: 10:08 AM   Check-In Time:  10:05 Am Visit Discharge Time: 10:40 AM    Original-Treating Physician or Chiropractor    Page 2-Insurer/TPA    Page 3-Employer    Page 4-Employee

## 2024-02-02 NOTE — PROGRESS NOTES
Subjective:     OAR LORENZ is a 53 y.o. female who presents for Work-Related Injury ( FV DOI: 01-20-24 BACK INJURY )      HPI  DOI: 1/20/2024  Right low back injury. JUDY: repositioning patient with lifting and twisting motion.  Pain severity 2/10 currently. No radiation. Pain is worse 4/10 when standing from sitting position. Worse with sitting. Some improvement with tylenol and motrin. No fever. No PMH active cancer/weight stable. No prior injury or surgery to back. No second job or outside activity contributing.      FV#1: 2/2/2024.  When he notes that her pain has improved since her previous visit.  She has used Flexeril twice and does continue to use Tiger balm, ibuprofen and Tylenol as needed.  She is performing her stretches every day.  She has not returned to work since her initial injury.  She is ready to return to work and trial full duty.    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 at SURGERY [facility]S    FAITH BY LAPAROSCOPY  9/27/2012    Performed by Jana Henriquez M.D. at SURGERY [facility]S    PELVISCOPY  9/16/2010    Performed by LALY AFLLON at SURGERY [facility]S    ABDOMINAL HYSTERECTOMY TOTAL  2006    fibroid removal      SOCHX:   Social History     Socioeconomic History    Marital status:     Highest education level: Bachelor's degree (e.g., BA, AB, BS)   Occupational History    Occupation: ER      Employer: [employer]   Tobacco Use     Smoking status: Never    Smokeless tobacco: Never   Vaping Use    Vaping Use: Never used   Substance and Sexual Activity    Alcohol use: Yes     Comment: glass of wine every six months    Drug use: No    Sexual activity: Yes     Partners: Male     Birth control/protection: Surgical     Social Determinants of Health     Financial Resource Strain: Low Risk  (4/11/2023)    Overall Financial Resource Strain (CARDIA)     Difficulty of Paying Living Expenses: Not very hard   Food Insecurity: No Food Insecurity (4/11/2023)    Hunger Vital Sign     Worried About Running Out of Food in the Last Year: Never true     Ran Out of Food in the Last Year: Never true   Transportation Needs: No Transportation Needs (4/11/2023)    PRAPARE - Transportation     Lack of Transportation (Medical): No     Lack of Transportation (Non-Medical): No   Physical Activity: Insufficiently Active (4/11/2023)    Exercise Vital Sign     Days of Exercise per Week: 2 days     Minutes of Exercise per Session: 20 min   Stress: No Stress Concern Present (4/11/2023)    [place] [employer] - Occupational Stress Questionnaire     Feeling of Stress : Only a little   Social Connections: Moderately Integrated (4/11/2023)    Social Connection and Isolation Panel [NHANES]     Frequency of Communication with Friends and Family: More than three times a week     Frequency of Social Gatherings with Friends and Family: Once a week     Attends Nondenominational Services: 1 to 4 times per year     Active Member of Clubs or Organizations: No     Attends Club or Organization Meetings: Never     Marital Status:    Housing Stability: Low Risk  (4/11/2023)    Housing Stability Vital Sign     Unable to Pay for Housing in the Last Year: No     Number of Places Lived in the Last Year: 1     Unstable Housing in the Last Year: No     FH:   Family History   Problem Relation Age of Onset    Diabetes Mother         diet controlled    Hypertension Father     Heart  "Disease Father         MI age 61    Cancer Father         prostate ca    Cancer Sister         ovarian lesion    Heart Disease Brother         blood clot heart         Objective:   /76   Pulse 83   Temp 36.3 °C (97.3 °F) (Temporal)   Resp 16   Ht 1.549 m (5' 1\")   Wt 78.5 kg (173 lb)   SpO2 97%   BMI 32.69 kg/m²     Physical Exam  Vitals and nursing note reviewed.   Constitutional:       General: She is not in acute distress.     Appearance: Normal appearance. She is normal weight. She is not ill-appearing or toxic-appearing.   HENT:      Head: Normocephalic.      Right Ear: External ear normal.      Left Ear: External ear normal.      Nose: No congestion or rhinorrhea.      Mouth/Throat:      Pharynx: No oropharyngeal exudate or posterior oropharyngeal erythema.   Eyes:      General:         Right eye: No discharge.         Left eye: No discharge.      Pupils: Pupils are equal, round, and reactive to light.   Pulmonary:      Effort: Pulmonary effort is normal.   Abdominal:      General: Abdomen is flat.   Musculoskeletal:         General: Normal range of motion.      Cervical back: Normal range of motion and neck supple.      Lumbar back: Tenderness present. No bony tenderness. Negative right straight leg raise test and negative left straight leg raise test.      Comments: Range of motion has improved since previous visit.  She is able to twist and bend and reach past her knees.   Skin:     General: Skin is dry.   Neurological:      General: No focal deficit present.      Mental Status: She is alert and oriented to person, place, and time. Mental status is at baseline.   Psychiatric:         Mood and Affect: Mood normal.         Behavior: Behavior normal.         Thought Content: Thought content normal.         Judgment: Judgment normal.         Assessment/Plan:   Assessment    1. Sacroiliac sprain, subsequent encounter          Patient was returned to work full max duty at this time.  She may " follow-up in 7 days or sooner for worsening symptoms.  Continue with supportive treatment as needed.  She is in agreement with this plan of care.

## 2024-02-08 ENCOUNTER — OCCUPATIONAL MEDICINE (OUTPATIENT)
Dept: URGENT CARE | Facility: PHYSICIAN GROUP | Age: 54
End: 2024-02-08
Payer: COMMERCIAL

## 2024-02-08 VITALS
HEART RATE: 76 BPM | TEMPERATURE: 97.2 F | BODY MASS INDEX: 31.95 KG/M2 | OXYGEN SATURATION: 98 % | DIASTOLIC BLOOD PRESSURE: 88 MMHG | HEIGHT: 61 IN | WEIGHT: 169.2 LBS | RESPIRATION RATE: 18 BRPM | SYSTOLIC BLOOD PRESSURE: 138 MMHG

## 2024-02-08 DIAGNOSIS — S33.6XXD SACROILIAC SPRAIN, SUBSEQUENT ENCOUNTER: ICD-10-CM

## 2024-02-08 PROCEDURE — 1125F AMNT PAIN NOTED PAIN PRSNT: CPT | Performed by: NURSE PRACTITIONER

## 2024-02-08 PROCEDURE — 3079F DIAST BP 80-89 MM HG: CPT | Performed by: NURSE PRACTITIONER

## 2024-02-08 PROCEDURE — 99213 OFFICE O/P EST LOW 20 MIN: CPT | Performed by: NURSE PRACTITIONER

## 2024-02-08 PROCEDURE — 3075F SYST BP GE 130 - 139MM HG: CPT | Performed by: NURSE PRACTITIONER

## 2024-02-08 RX ORDER — METHOCARBAMOL 500 MG/1
1000 TABLET, FILM COATED ORAL 4 TIMES DAILY
Qty: 120 TABLET | Refills: 0 | Status: SHIPPED | OUTPATIENT
Start: 2024-02-08 | End: 2024-02-18

## 2024-02-08 ASSESSMENT — ENCOUNTER SYMPTOMS
MYALGIAS: 1
BACK PAIN: 1
SENSORY CHANGE: 0

## 2024-02-08 ASSESSMENT — PAIN SCALES - GENERAL: PAINLEVEL: 2=MINIMAL-SLIGHT

## 2024-02-08 ASSESSMENT — FIBROSIS 4 INDEX: FIB4 SCORE: 0.92

## 2024-02-08 NOTE — PROGRESS NOTES
Subjective:     ORA LORENZ is a 53 y.o. female who presents for Follow-Up (W/C follow up lower back. Still sore and tight down the left leg. Bottoms of the feet hurt and feel she is waking on balls. )      HPI  HPI  DOI: 1/20/2024  Right low back injury. JUDY: repositioning patient with lifting and twisting motion.  Pain severity 2/10 currently. No radiation. Pain is worse 4/10 when standing from sitting position. Worse with sitting. Some improvement with tylenol and motrin. No fever. No PMH active cancer/weight stable. No prior injury or surgery to back. No second job or outside activity contributing.       FV#1: 2/2/2024.  Alexandra notes that her pain has improved since her previous visit.  She has used Flexeril twice and does continue to use Tiger balm, ibuprofen and Tylenol as needed.  She is performing her stretches every day.  She has not returned to work since her initial injury.  She is ready to return to work and trial full duty.    FV#2: Alexandra is tolerating full duty work at this time.  She does continue to endorse right-sided low back pain that is radiating down her buttock and right thigh.  She is now also complaining of pain in the heel of her right foot.  She states when she steps down she feels as though there is a ball that she is stepping on.  This is an annoying discomfort.  She is not using the Flexeril for relief of her symptoms as this makes her extremely drowsy.  She is using yoga stretching, heat, ice and notes that this is very beneficial.     Review of Systems   Musculoskeletal:  Positive for back pain and myalgias.   Neurological:  Negative for sensory change.       PMH:   Past Medical History:   Diagnosis Date    Anesthesia     naausea    Infectious disease     MRSA/VRE - works in hospital    Other specified symptom associated with female genital organs     hysterectomy    Pain     Unspecified disorder of thyroid      ALLERGIES:   Allergies   Allergen Reactions    Pork Allergy  Itching     itching  Other reaction(s): other  Other reaction(s): other     SURGHX:   Past Surgical History:   Procedure Laterality Date    PARATHYROIDECTOMY Right 05/02/2019    ABDOMINOPLASTY  12/11/2013    Performed by Sherrie Knott M.D. at SURGERY [facility]    FAITH BY LAPAROSCOPY  9/27/2012    Performed by Jana Henriquez M.D. at SURGERY [facility]    PELVISCOPY  9/16/2010    Performed by LALY FALLON at SURGERY [facility]    ABDOMINAL HYSTERECTOMY TOTAL  2006    fibroid removal      SOCHX:   Social History     Socioeconomic History    Marital status:     Highest education level: Bachelor's degree (e.g., BA, AB, BS)   Occupational History    Occupation: ER      Employer: [employer]   Tobacco Use    Smoking status: Never    Smokeless tobacco: Never   Vaping Use    Vaping Use: Never used   Substance and Sexual Activity    Alcohol use: Yes     Comment: glass of wine every six months    Drug use: No    Sexual activity: Yes     Partners: Male     Birth control/protection: Surgical     Social Determinants of Health     Financial Resource Strain: Low Risk  (4/11/2023)    Overall Financial Resource Strain (CARDIA)     Difficulty of Paying Living Expenses: Not very hard   Food Insecurity: No Food Insecurity (4/11/2023)    Hunger Vital Sign     Worried About Running Out of Food in the Last Year: Never true     Ran Out of Food in the Last Year: Never true   Transportation Needs: No Transportation Needs (4/11/2023)    PRAPARE - Transportation     Lack of Transportation (Medical): No     Lack of Transportation (Non-Medical): No   Physical Activity: Insufficiently Active (4/11/2023)    Exercise Vital Sign     Days of Exercise per Week: 2 days     Minutes of Exercise per Session: 20 min   Stress: No Stress Concern Present (4/11/2023)    [place] [employer]ational [employer] - Occupational Stress Questionnaire     Feeling of Stress : Only a little   Social Connections: Moderately Integrated  "(4/11/2023)    Social Connection and Isolation Panel [NHANES]     Frequency of Communication with Friends and Family: More than three times a week     Frequency of Social Gatherings with Friends and Family: Once a week     Attends Voodoo Services: 1 to 4 times per year     Active Member of Clubs or Organizations: No     Attends Club or Organization Meetings: Never     Marital Status:    Housing Stability: Low Risk  (4/11/2023)    Housing Stability Vital Sign     Unable to Pay for Housing in the Last Year: No     Number of Places Lived in the Last Year: 1     Unstable Housing in the Last Year: No     FH:   Family History   Problem Relation Age of Onset    Diabetes Mother         diet controlled    Hypertension Father     Heart Disease Father         MI age 61    Cancer Father         prostate ca    Cancer Sister         ovarian lesion    Heart Disease Brother         blood clot heart         Objective:   /88   Pulse 76   Temp 36.2 °C (97.2 °F) (Temporal)   Resp 18   Ht 1.549 m (5' 1\")   Wt 76.7 kg (169 lb 3.2 oz)   SpO2 98%   BMI 31.97 kg/m²     Physical Exam  Vitals and nursing note reviewed.   Constitutional:       General: She is not in acute distress.     Appearance: Normal appearance. She is normal weight. She is not ill-appearing or toxic-appearing.   HENT:      Head: Normocephalic.      Right Ear: External ear normal.      Left Ear: External ear normal.      Nose: No congestion or rhinorrhea.      Mouth/Throat:      Pharynx: No oropharyngeal exudate or posterior oropharyngeal erythema.   Eyes:      General:         Right eye: No discharge.         Left eye: No discharge.      Pupils: Pupils are equal, round, and reactive to light.   Cardiovascular:      Rate and Rhythm: Normal rate and regular rhythm.   Pulmonary:      Effort: Pulmonary effort is normal.   Abdominal:      General: Abdomen is flat.   Musculoskeletal:      Cervical back: Normal range of motion and neck supple.      Lumbar " back: Spasms and tenderness present. No bony tenderness. Decreased range of motion. Negative right straight leg raise test and negative left straight leg raise test.        Back:    Skin:     General: Skin is dry.   Neurological:      General: No focal deficit present.      Mental Status: She is alert and oriented to person, place, and time. Mental status is at baseline.   Psychiatric:         Mood and Affect: Mood normal.         Behavior: Behavior normal.         Thought Content: Thought content normal.         Judgment: Judgment normal.         Assessment/Plan:   Assessment      1. Sacroiliac sprain, subsequent encounter  Referral to Physical Therapy    methocarbamol (ROBAXIN) 500 MG Tab        Patient was referred to follow-up with physical therapy for further treatment of low back pain/sprain.  Continue with stretching, anti-inflammatories, heat and ice.  Flexeril was switched to Robaxin for less sedating side effects.  Patient to follow-up in 2 weeks or sooner for worsening symptoms.  She is in agreement with plan of care.

## 2024-02-08 NOTE — LETTER
[facility] [address] [place] [address]  Phone:  [phone] - Fax:  [phone]   [facility] Progress Report and Disability Certification  Date of Service: 2/8/2024   No Show:  No  Date / Time of Next Visit:     Claim Information   Patient Name: ORA LORENZ  Claim Number:     Employer: [employer]  Date of Injury: 1/20/2024     Insurer / TPA: [employer]  ID / SSN:     Occupation: Registered Nurse  Diagnosis: The encounter diagnosis was Sacroiliac sprain, subsequent encounter.    Medical Information   Related to Industrial Injury? Yes    Subjective Complaints:  HPI  DOI: 1/20/2024  Right low back injury. JUDY: repositioning patient with lifting and twisting motion.  Pain severity 2/10 currently. No radiation. Pain is worse 4/10 when standing from sitting position. Worse with sitting. Some improvement with tylenol and motrin. No fever. No PMH active cancer/weight stable. No prior injury or surgery to back. No second job or outside activity contributing.       FV#1: 2/2/2024.  Alexandra notes that her pain has improved since her previous visit.  She has used Flexeril twice and does continue to use Tiger balm, ibuprofen and Tylenol as needed.  She is performing her stretches every day.  She has not returned to work since her initial injury.  She is ready to return to work and trial full duty.    FV#2: Alexandra is tolerating full duty work at this time.  She does continue to endorse right-sided low back pain that is radiating down her buttock and right thigh.  She is now also complaining of pain in the heel of her right foot.  She states when she steps down she feels as though there is a ball that she is stepping on.  This is an annoying discomfort.  She is not using the Flexeril for relief of her symptoms as this makes her extremely drowsy.  She is using yoga stretching, heat, ice and notes that this is very beneficial.    Objective Findings: Lumbar back:  Spasms and tenderness present. No bony tenderness. Decreased range of motion. Negative right straight leg raise test and negative left straight leg raise test.      Pre-Existing Condition(s):     Assessment:   Condition Same    Status: Additional Care Required  Permanent Disability:No    Plan: PT    Diagnostics:      Comments:       Disability Information   Status: Released to Full Duty    From:     Through:   Restrictions are:     Physical Restrictions   Sitting:    Standing:    Stooping:    Bending:      Squatting:    Walking:    Climbing:    Pushing:      Pulling:    Other:    Reaching Above Shoulder (L):   Reaching Above Shoulder (R):       Reaching Below Shoulder (L):    Reaching Below Shoulder (R):      Not to exceed Weight Limits   Carrying(hrs):   Weight Limit(lb):   Lifting(hrs):   Weight  Limit(lb):     Comments: Return in 2 weeks or sooner for worsening symptoms.    Repetitive Actions   Hands: i.e. Fine Manipulations from Grasping:     Feet: i.e. Operating Foot Controls:     Driving / Operate Machinery:     Health Care Provider’s Original or Electronic Signature  JENY Ramsey Health Care Provider’s Original or Electronic Signature    Lj Maldonado DO MPH     Clinic Name / Location: [address] [address] Clinic Phone Number: Dept: [phone]   Appointment Time: 10:30 Am Visit Start Time: 10:40 AM   Check-In Time:  10:30 Am Visit Discharge Time:  11:22 AM   Original-Treating Physician or Chiropractor    Page 2-Insurer/TPA    Page 3-Employer    Page 4-Employee

## 2024-02-16 ENCOUNTER — APPOINTMENT (OUTPATIENT)
Dept: MEDICAL GROUP | Facility: LAB | Age: 54
End: 2024-02-16
Payer: COMMERCIAL

## 2024-02-21 ENCOUNTER — APPOINTMENT (OUTPATIENT)
Dept: RADIOLOGY | Facility: MEDICAL CENTER | Age: 54
End: 2024-02-21
Attending: EMERGENCY MEDICINE
Payer: COMMERCIAL

## 2024-02-21 ENCOUNTER — HOSPITAL ENCOUNTER (OUTPATIENT)
Dept: LAB | Facility: MEDICAL CENTER | Age: 54
End: 2024-02-21
Attending: INTERNAL MEDICINE
Payer: COMMERCIAL

## 2024-02-21 ENCOUNTER — HOSPITAL ENCOUNTER (OUTPATIENT)
Facility: MEDICAL CENTER | Age: 54
End: 2024-02-22
Attending: EMERGENCY MEDICINE | Admitting: HOSPITALIST
Payer: COMMERCIAL

## 2024-02-21 ENCOUNTER — APPOINTMENT (OUTPATIENT)
Dept: RADIOLOGY | Facility: MEDICAL CENTER | Age: 54
End: 2024-02-21
Attending: HOSPITALIST
Payer: COMMERCIAL

## 2024-02-21 ENCOUNTER — HOSPITAL ENCOUNTER (OUTPATIENT)
Dept: LAB | Facility: MEDICAL CENTER | Age: 54
End: 2024-02-21
Attending: STUDENT IN AN ORGANIZED HEALTH CARE EDUCATION/TRAINING PROGRAM
Payer: COMMERCIAL

## 2024-02-21 ENCOUNTER — HOSPITAL ENCOUNTER (OUTPATIENT)
Dept: LAB | Facility: MEDICAL CENTER | Age: 54
End: 2024-02-21
Attending: PSYCHIATRY & NEUROLOGY
Payer: COMMERCIAL

## 2024-02-21 DIAGNOSIS — R29.810 FACIAL DROOP: ICD-10-CM

## 2024-02-21 DIAGNOSIS — M62.81 MUSCLE WEAKNESS: ICD-10-CM

## 2024-02-21 DIAGNOSIS — E55.9 VITAMIN D DEFICIENCY: ICD-10-CM

## 2024-02-21 DIAGNOSIS — G51.0 BELL'S PALSY: ICD-10-CM

## 2024-02-21 DIAGNOSIS — E89.2 S/P PARATHYROIDECTOMY: ICD-10-CM

## 2024-02-21 DIAGNOSIS — M81.0 AGE-RELATED OSTEOPOROSIS WITHOUT CURRENT PATHOLOGICAL FRACTURE: ICD-10-CM

## 2024-02-21 PROBLEM — M54.41 LOW BACK PAIN WITH RIGHT-SIDED SCIATICA: Status: ACTIVE | Noted: 2024-02-21

## 2024-02-21 PROBLEM — G45.9 TIA (TRANSIENT ISCHEMIC ATTACK): Status: ACTIVE | Noted: 2024-02-21

## 2024-02-21 LAB
25(OH)D3 SERPL-MCNC: 27 NG/ML (ref 30–100)
ABO + RH BLD: NORMAL
ABO GROUP BLD: NORMAL
ALBUMIN SERPL BCP-MCNC: 4.3 G/DL (ref 3.2–4.9)
ALBUMIN SERPL BCP-MCNC: 4.7 G/DL (ref 3.2–4.9)
ALBUMIN/GLOB SERPL: 1.5 G/DL
ALBUMIN/GLOB SERPL: 1.6 G/DL
ALP SERPL-CCNC: 87 U/L (ref 30–99)
ALP SERPL-CCNC: 94 U/L (ref 30–99)
ALT SERPL-CCNC: 22 U/L (ref 2–50)
ALT SERPL-CCNC: 25 U/L (ref 2–50)
ANION GAP SERPL CALC-SCNC: 14 MMOL/L (ref 7–16)
ANION GAP SERPL CALC-SCNC: 16 MMOL/L (ref 7–16)
APTT PPP: 29.5 SEC (ref 24.7–36)
AST SERPL-CCNC: 20 U/L (ref 12–45)
AST SERPL-CCNC: 22 U/L (ref 12–45)
BASOPHILS # BLD AUTO: 0.3 % (ref 0–1.8)
BASOPHILS # BLD: 0.02 K/UL (ref 0–0.12)
BILIRUB SERPL-MCNC: 0.5 MG/DL (ref 0.1–1.5)
BILIRUB SERPL-MCNC: 0.5 MG/DL (ref 0.1–1.5)
BLD GP AB SCN SERPL QL: NORMAL
BUN SERPL-MCNC: 16 MG/DL (ref 8–22)
BUN SERPL-MCNC: 18 MG/DL (ref 8–22)
CALCIUM ALBUM COR SERPL-MCNC: 9 MG/DL (ref 8.5–10.5)
CALCIUM ALBUM COR SERPL-MCNC: 9.1 MG/DL (ref 8.5–10.5)
CALCIUM SERPL-MCNC: 9.2 MG/DL (ref 8.4–10.2)
CALCIUM SERPL-MCNC: 9.7 MG/DL (ref 8.4–10.2)
CHLORIDE SERPL-SCNC: 101 MMOL/L (ref 96–112)
CHLORIDE SERPL-SCNC: 105 MMOL/L (ref 96–112)
CO2 SERPL-SCNC: 23 MMOL/L (ref 20–33)
CO2 SERPL-SCNC: 23 MMOL/L (ref 20–33)
CREAT SERPL-MCNC: 0.67 MG/DL (ref 0.5–1.4)
CREAT SERPL-MCNC: 0.75 MG/DL (ref 0.5–1.4)
CRP SERPL HS-MCNC: <0.3 MG/DL (ref 0–0.75)
EKG IMPRESSION: NORMAL
EOSINOPHIL # BLD AUTO: 0.12 K/UL (ref 0–0.51)
EOSINOPHIL NFR BLD: 1.8 % (ref 0–6.9)
ERYTHROCYTE [DISTWIDTH] IN BLOOD BY AUTOMATED COUNT: 44.4 FL (ref 35.9–50)
ERYTHROCYTE [SEDIMENTATION RATE] IN BLOOD BY WESTERGREN METHOD: 5 MM/HOUR (ref 0–25)
FASTING STATUS PATIENT QL REPORTED: NORMAL
FOLATE SERPL-MCNC: 12.1 NG/ML
GFR SERPLBLD CREATININE-BSD FMLA CKD-EPI: 105 ML/MIN/1.73 M 2
GFR SERPLBLD CREATININE-BSD FMLA CKD-EPI: 95 ML/MIN/1.73 M 2
GLOBULIN SER CALC-MCNC: 2.8 G/DL (ref 1.9–3.5)
GLOBULIN SER CALC-MCNC: 3 G/DL (ref 1.9–3.5)
GLUCOSE SERPL-MCNC: 126 MG/DL (ref 65–99)
GLUCOSE SERPL-MCNC: 82 MG/DL (ref 65–99)
HCT VFR BLD AUTO: 44.4 % (ref 37–47)
HGB BLD-MCNC: 15.5 G/DL (ref 12–16)
IMM GRANULOCYTES # BLD AUTO: 0.01 K/UL (ref 0–0.11)
IMM GRANULOCYTES NFR BLD AUTO: 0.1 % (ref 0–0.9)
INR PPP: 0.93 (ref 0.87–1.13)
LYMPHOCYTES # BLD AUTO: 3.32 K/UL (ref 1–4.8)
LYMPHOCYTES NFR BLD: 49.1 % (ref 22–41)
MCH RBC QN AUTO: 33.5 PG (ref 27–33)
MCHC RBC AUTO-ENTMCNC: 34.9 G/DL (ref 32.2–35.5)
MCV RBC AUTO: 96.1 FL (ref 81.4–97.8)
MONOCYTES # BLD AUTO: 0.42 K/UL (ref 0–0.85)
MONOCYTES NFR BLD AUTO: 6.2 % (ref 0–13.4)
NEUTROPHILS # BLD AUTO: 2.87 K/UL (ref 1.82–7.42)
NEUTROPHILS NFR BLD: 42.5 % (ref 44–72)
NRBC # BLD AUTO: 0 K/UL
NRBC BLD-RTO: 0 /100 WBC (ref 0–0.2)
PHOSPHATE SERPL-MCNC: 4.4 MG/DL (ref 2.5–4.5)
PLATELET # BLD AUTO: 252 K/UL (ref 164–446)
PMV BLD AUTO: 9.6 FL (ref 9–12.9)
POTASSIUM SERPL-SCNC: 3.8 MMOL/L (ref 3.6–5.5)
POTASSIUM SERPL-SCNC: 4.1 MMOL/L (ref 3.6–5.5)
PROT SERPL-MCNC: 7.1 G/DL (ref 6–8.2)
PROT SERPL-MCNC: 7.7 G/DL (ref 6–8.2)
PROTHROMBIN TIME: 12.9 SEC (ref 12–14.6)
RBC # BLD AUTO: 4.62 M/UL (ref 4.2–5.4)
RH BLD: NORMAL
SODIUM SERPL-SCNC: 140 MMOL/L (ref 135–145)
SODIUM SERPL-SCNC: 142 MMOL/L (ref 135–145)
T4 FREE SERPL-MCNC: 1.41 NG/DL (ref 0.93–1.7)
TROPONIN T SERPL-MCNC: <6 NG/L (ref 6–19)
TSH SERPL DL<=0.005 MIU/L-ACNC: 1.27 UIU/ML (ref 0.38–5.33)
TSH SERPL DL<=0.005 MIU/L-ACNC: 1.27 UIU/ML (ref 0.38–5.33)
VIT B12 SERPL-MCNC: 569 PG/ML (ref 211–911)
WBC # BLD AUTO: 6.8 K/UL (ref 4.8–10.8)

## 2024-02-21 PROCEDURE — 85025 COMPLETE CBC W/AUTO DIFF WBC: CPT

## 2024-02-21 PROCEDURE — 82728 ASSAY OF FERRITIN: CPT

## 2024-02-21 PROCEDURE — 84207 ASSAY OF VITAMIN B-6: CPT

## 2024-02-21 PROCEDURE — 70498 CT ANGIOGRAPHY NECK: CPT

## 2024-02-21 PROCEDURE — 85652 RBC SED RATE AUTOMATED: CPT

## 2024-02-21 PROCEDURE — 70551 MRI BRAIN STEM W/O DYE: CPT

## 2024-02-21 PROCEDURE — 86038 ANTINUCLEAR ANTIBODIES: CPT

## 2024-02-21 PROCEDURE — 70496 CT ANGIOGRAPHY HEAD: CPT

## 2024-02-21 PROCEDURE — G0378 HOSPITAL OBSERVATION PER HR: HCPCS

## 2024-02-21 PROCEDURE — 86235 NUCLEAR ANTIGEN ANTIBODY: CPT

## 2024-02-21 PROCEDURE — 82607 VITAMIN B-12: CPT

## 2024-02-21 PROCEDURE — 80053 COMPREHEN METABOLIC PANEL: CPT | Mod: 91

## 2024-02-21 PROCEDURE — 99223 1ST HOSP IP/OBS HIGH 75: CPT | Performed by: HOSPITALIST

## 2024-02-21 PROCEDURE — 96374 THER/PROPH/DIAG INJ IV PUSH: CPT

## 2024-02-21 PROCEDURE — 99291 CRITICAL CARE FIRST HOUR: CPT

## 2024-02-21 PROCEDURE — 84443 ASSAY THYROID STIM HORMONE: CPT | Mod: 91

## 2024-02-21 PROCEDURE — 70450 CT HEAD/BRAIN W/O DYE: CPT

## 2024-02-21 PROCEDURE — 84439 ASSAY OF FREE THYROXINE: CPT

## 2024-02-21 PROCEDURE — 36415 COLL VENOUS BLD VENIPUNCTURE: CPT

## 2024-02-21 PROCEDURE — 86850 RBC ANTIBODY SCREEN: CPT

## 2024-02-21 PROCEDURE — 80053 COMPREHEN METABOLIC PANEL: CPT

## 2024-02-21 PROCEDURE — 82306 VITAMIN D 25 HYDROXY: CPT

## 2024-02-21 PROCEDURE — 86140 C-REACTIVE PROTEIN: CPT

## 2024-02-21 PROCEDURE — 94760 N-INVAS EAR/PLS OXIMETRY 1: CPT

## 2024-02-21 PROCEDURE — 84100 ASSAY OF PHOSPHORUS: CPT

## 2024-02-21 PROCEDURE — 0042T CT-CEREBRAL PERFUSION ANALYSIS: CPT

## 2024-02-21 PROCEDURE — 82523 COLLAGEN CROSSLINKS: CPT

## 2024-02-21 PROCEDURE — 82746 ASSAY OF FOLIC ACID SERUM: CPT

## 2024-02-21 PROCEDURE — 85610 PROTHROMBIN TIME: CPT

## 2024-02-21 PROCEDURE — 86901 BLOOD TYPING SEROLOGIC RH(D): CPT

## 2024-02-21 PROCEDURE — 85730 THROMBOPLASTIN TIME PARTIAL: CPT

## 2024-02-21 PROCEDURE — A9270 NON-COVERED ITEM OR SERVICE: HCPCS | Performed by: HOSPITALIST

## 2024-02-21 PROCEDURE — 84443 ASSAY THYROID STIM HORMONE: CPT

## 2024-02-21 PROCEDURE — 71045 X-RAY EXAM CHEST 1 VIEW: CPT

## 2024-02-21 PROCEDURE — 700111 HCHG RX REV CODE 636 W/ 250 OVERRIDE (IP): Performed by: HOSPITALIST

## 2024-02-21 PROCEDURE — 84484 ASSAY OF TROPONIN QUANT: CPT

## 2024-02-21 PROCEDURE — 86900 BLOOD TYPING SEROLOGIC ABO: CPT

## 2024-02-21 PROCEDURE — 700102 HCHG RX REV CODE 250 W/ 637 OVERRIDE(OP): Performed by: HOSPITALIST

## 2024-02-21 PROCEDURE — 700117 HCHG RX CONTRAST REV CODE 255: Performed by: EMERGENCY MEDICINE

## 2024-02-21 PROCEDURE — 83970 ASSAY OF PARATHORMONE: CPT

## 2024-02-21 PROCEDURE — 93005 ELECTROCARDIOGRAM TRACING: CPT | Performed by: EMERGENCY MEDICINE

## 2024-02-21 RX ORDER — OMEPRAZOLE 20 MG/1
20 CAPSULE, DELAYED RELEASE ORAL DAILY
Status: DISCONTINUED | OUTPATIENT
Start: 2024-02-22 | End: 2024-02-22 | Stop reason: HOSPADM

## 2024-02-21 RX ORDER — ONDANSETRON 2 MG/ML
4 INJECTION INTRAMUSCULAR; INTRAVENOUS EVERY 4 HOURS PRN
Status: DISCONTINUED | OUTPATIENT
Start: 2024-02-21 | End: 2024-02-22 | Stop reason: HOSPADM

## 2024-02-21 RX ORDER — PROMETHAZINE HYDROCHLORIDE 25 MG/1
12.5-25 TABLET ORAL EVERY 4 HOURS PRN
Status: DISCONTINUED | OUTPATIENT
Start: 2024-02-21 | End: 2024-02-22 | Stop reason: HOSPADM

## 2024-02-21 RX ORDER — METHOCARBAMOL 500 MG/1
500-1000 TABLET, FILM COATED ORAL 3 TIMES DAILY PRN
COMMUNITY
End: 2024-03-07

## 2024-02-21 RX ORDER — LORAZEPAM 2 MG/ML
2 INJECTION INTRAMUSCULAR ONCE
Status: COMPLETED | OUTPATIENT
Start: 2024-02-21 | End: 2024-02-21

## 2024-02-21 RX ORDER — PROCHLORPERAZINE EDISYLATE 5 MG/ML
5-10 INJECTION INTRAMUSCULAR; INTRAVENOUS EVERY 4 HOURS PRN
Status: DISCONTINUED | OUTPATIENT
Start: 2024-02-21 | End: 2024-02-22 | Stop reason: HOSPADM

## 2024-02-21 RX ORDER — PROMETHAZINE HYDROCHLORIDE 25 MG/1
12.5-25 SUPPOSITORY RECTAL EVERY 4 HOURS PRN
Status: DISCONTINUED | OUTPATIENT
Start: 2024-02-21 | End: 2024-02-22 | Stop reason: HOSPADM

## 2024-02-21 RX ORDER — CALCIUM CARBONATE 750 MG/1
1 TABLET ORAL PRN
COMMUNITY

## 2024-02-21 RX ORDER — LABETALOL HYDROCHLORIDE 5 MG/ML
10 INJECTION, SOLUTION INTRAVENOUS EVERY 4 HOURS PRN
Status: DISCONTINUED | OUTPATIENT
Start: 2024-02-21 | End: 2024-02-22 | Stop reason: HOSPADM

## 2024-02-21 RX ORDER — LOSARTAN POTASSIUM 25 MG/1
25 TABLET ORAL DAILY
Status: DISCONTINUED | OUTPATIENT
Start: 2024-02-22 | End: 2024-02-22 | Stop reason: HOSPADM

## 2024-02-21 RX ORDER — ATORVASTATIN CALCIUM 40 MG/1
80 TABLET, FILM COATED ORAL EVERY EVENING
Status: DISCONTINUED | OUTPATIENT
Start: 2024-02-21 | End: 2024-02-22 | Stop reason: HOSPADM

## 2024-02-21 RX ORDER — ASPIRIN 325 MG
325 TABLET ORAL DAILY
Status: DISCONTINUED | OUTPATIENT
Start: 2024-02-21 | End: 2024-02-22 | Stop reason: HOSPADM

## 2024-02-21 RX ORDER — ONDANSETRON 4 MG/1
4 TABLET, ORALLY DISINTEGRATING ORAL EVERY 4 HOURS PRN
Status: DISCONTINUED | OUTPATIENT
Start: 2024-02-21 | End: 2024-02-22 | Stop reason: HOSPADM

## 2024-02-21 RX ORDER — ASPIRIN 325 MG
325 TABLET ORAL DAILY
Status: DISCONTINUED | OUTPATIENT
Start: 2024-02-22 | End: 2024-02-21

## 2024-02-21 RX ADMIN — IOHEXOL 40 ML: 350 INJECTION, SOLUTION INTRAVENOUS at 16:28

## 2024-02-21 RX ADMIN — ATORVASTATIN CALCIUM 80 MG: 40 TABLET, FILM COATED ORAL at 21:08

## 2024-02-21 RX ADMIN — IOHEXOL 80 ML: 350 INJECTION, SOLUTION INTRAVENOUS at 16:35

## 2024-02-21 RX ADMIN — LORAZEPAM 0.5 MG: 2 INJECTION INTRAMUSCULAR; INTRAVENOUS at 18:41

## 2024-02-21 RX ADMIN — ASPIRIN 325 MG: 325 TABLET ORAL at 21:08

## 2024-02-21 ASSESSMENT — ENCOUNTER SYMPTOMS
PALPITATIONS: 0
COUGH: 0
SENSORY CHANGE: 1
DIARRHEA: 0
BRUISES/BLEEDS EASILY: 0
RESPIRATORY NEGATIVE: 1
NAUSEA: 0
HEARTBURN: 0
SPEECH CHANGE: 1
PSYCHIATRIC NEGATIVE: 1
FEVER: 0
HEADACHES: 0
DIZZINESS: 0
GASTROINTESTINAL NEGATIVE: 1
CONSTITUTIONAL NEGATIVE: 1
NERVOUS/ANXIOUS: 0
EYES NEGATIVE: 1
LOSS OF CONSCIOUSNESS: 0
DIAPHORESIS: 0
SEIZURES: 0
CARDIOVASCULAR NEGATIVE: 1
WHEEZING: 0
CHILLS: 0
BLOOD IN STOOL: 0
CONSTIPATION: 0
DOUBLE VISION: 0
HEMOPTYSIS: 0
BACK PAIN: 1
FOCAL WEAKNESS: 1
ABDOMINAL PAIN: 0
VOMITING: 0

## 2024-02-21 ASSESSMENT — HEART SCORE
ECG: NORMAL
AGE: 45-64
HEART SCORE: 2
TROPONIN: LESS THAN OR EQUAL TO NORMAL LIMIT
HISTORY: SLIGHTLY SUSPICIOUS
RISK FACTORS: 1-2 RISK FACTORS

## 2024-02-21 ASSESSMENT — PATIENT HEALTH QUESTIONNAIRE - PHQ9
1. LITTLE INTEREST OR PLEASURE IN DOING THINGS: NOT AT ALL
SUM OF ALL RESPONSES TO PHQ9 QUESTIONS 1 AND 2: 0
2. FEELING DOWN, DEPRESSED, IRRITABLE, OR HOPELESS: NOT AT ALL

## 2024-02-21 ASSESSMENT — LIFESTYLE VARIABLES
TOTAL SCORE: 0
EVER FELT BAD OR GUILTY ABOUT YOUR DRINKING: NO
EVER HAD A DRINK FIRST THING IN THE MORNING TO STEADY YOUR NERVES TO GET RID OF A HANGOVER: NO
HAVE PEOPLE ANNOYED YOU BY CRITICIZING YOUR DRINKING: NO
ALCOHOL_USE: NO
TOTAL SCORE: 0
AVERAGE NUMBER OF DAYS PER WEEK YOU HAVE A DRINK CONTAINING ALCOHOL: 0.25
ON A TYPICAL DAY WHEN YOU DRINK ALCOHOL HOW MANY DRINKS DO YOU HAVE: 1
HAVE YOU EVER FELT YOU SHOULD CUT DOWN ON YOUR DRINKING: NO
TOTAL SCORE: 0
HOW MANY TIMES IN THE PAST YEAR HAVE YOU HAD 5 OR MORE DRINKS IN A DAY: 0
CONSUMPTION TOTAL: NEGATIVE

## 2024-02-21 ASSESSMENT — COGNITIVE AND FUNCTIONAL STATUS - GENERAL
DAILY ACTIVITIY SCORE: 24
SUGGESTED CMS G CODE MODIFIER DAILY ACTIVITY: CH
MOBILITY SCORE: 24
SUGGESTED CMS G CODE MODIFIER MOBILITY: CH

## 2024-02-21 ASSESSMENT — FIBROSIS 4 INDEX
FIB4 SCORE: 0.97
FIB4 SCORE: 0.91

## 2024-02-21 ASSESSMENT — VISUAL ACUITY: OU: 1

## 2024-02-22 ENCOUNTER — APPOINTMENT (OUTPATIENT)
Dept: URGENT CARE | Facility: PHYSICIAN GROUP | Age: 54
End: 2024-02-22
Payer: COMMERCIAL

## 2024-02-22 ENCOUNTER — APPOINTMENT (OUTPATIENT)
Dept: CARDIOLOGY | Facility: MEDICAL CENTER | Age: 54
End: 2024-02-22
Attending: HOSPITALIST
Payer: COMMERCIAL

## 2024-02-22 VITALS
SYSTOLIC BLOOD PRESSURE: 139 MMHG | HEIGHT: 61 IN | BODY MASS INDEX: 32.63 KG/M2 | WEIGHT: 172.84 LBS | OXYGEN SATURATION: 96 % | DIASTOLIC BLOOD PRESSURE: 87 MMHG | TEMPERATURE: 97.9 F | RESPIRATION RATE: 20 BRPM | HEART RATE: 64 BPM

## 2024-02-22 DIAGNOSIS — R25.1 TREMOR, UNSPECIFIED: ICD-10-CM

## 2024-02-22 DIAGNOSIS — G99.2 MYELOPATHY IN DISEASES CLASSIFIED ELSEWHERE (HCC): ICD-10-CM

## 2024-02-22 LAB
ANION GAP SERPL CALC-SCNC: 12 MMOL/L (ref 7–16)
BUN SERPL-MCNC: 15 MG/DL (ref 8–22)
CALCIUM SERPL-MCNC: 8.8 MG/DL (ref 8.4–10.2)
CHLORIDE SERPL-SCNC: 105 MMOL/L (ref 96–112)
CO2 SERPL-SCNC: 23 MMOL/L (ref 20–33)
CREAT SERPL-MCNC: 0.7 MG/DL (ref 0.5–1.4)
ERYTHROCYTE [DISTWIDTH] IN BLOOD BY AUTOMATED COUNT: 43.8 FL (ref 35.9–50)
FERRITIN SERPL-MCNC: 210 NG/ML (ref 10–291)
GFR SERPLBLD CREATININE-BSD FMLA CKD-EPI: 104 ML/MIN/1.73 M 2
GLUCOSE SERPL-MCNC: 97 MG/DL (ref 65–99)
HCT VFR BLD AUTO: 39.5 % (ref 37–47)
HGB BLD-MCNC: 13.8 G/DL (ref 12–16)
LV EJECT FRACT  99904: 74
LV EJECT FRACT MOD 2C 99903: 77.12
LV EJECT FRACT MOD 4C 99902: 70.31
LV EJECT FRACT MOD BP 99901: 74.4
MCH RBC QN AUTO: 33.1 PG (ref 27–33)
MCHC RBC AUTO-ENTMCNC: 34.9 G/DL (ref 32.2–35.5)
MCV RBC AUTO: 94.7 FL (ref 81.4–97.8)
PLATELET # BLD AUTO: 219 K/UL (ref 164–446)
PMV BLD AUTO: 9.4 FL (ref 9–12.9)
POTASSIUM SERPL-SCNC: 3.9 MMOL/L (ref 3.6–5.5)
PTH-INTACT SERPL-MCNC: 37.4 PG/ML (ref 14–72)
RBC # BLD AUTO: 4.17 M/UL (ref 4.2–5.4)
SODIUM SERPL-SCNC: 140 MMOL/L (ref 135–145)
WBC # BLD AUTO: 5.7 K/UL (ref 4.8–10.8)

## 2024-02-22 PROCEDURE — A9270 NON-COVERED ITEM OR SERVICE: HCPCS | Performed by: HOSPITALIST

## 2024-02-22 PROCEDURE — 93306 TTE W/DOPPLER COMPLETE: CPT | Mod: 26 | Performed by: INTERNAL MEDICINE

## 2024-02-22 PROCEDURE — 700102 HCHG RX REV CODE 250 W/ 637 OVERRIDE(OP): Performed by: HOSPITALIST

## 2024-02-22 PROCEDURE — 93306 TTE W/DOPPLER COMPLETE: CPT

## 2024-02-22 PROCEDURE — G0378 HOSPITAL OBSERVATION PER HR: HCPCS

## 2024-02-22 PROCEDURE — 80048 BASIC METABOLIC PNL TOTAL CA: CPT

## 2024-02-22 PROCEDURE — 94760 N-INVAS EAR/PLS OXIMETRY 1: CPT

## 2024-02-22 PROCEDURE — 99239 HOSP IP/OBS DSCHRG MGMT >30: CPT | Performed by: INTERNAL MEDICINE

## 2024-02-22 PROCEDURE — 85027 COMPLETE CBC AUTOMATED: CPT

## 2024-02-22 RX ORDER — PREDNISONE 20 MG/1
60 TABLET ORAL DAILY
Qty: 30 TABLET | Refills: 0 | Status: ON HOLD | OUTPATIENT
Start: 2024-02-22 | End: 2024-02-26

## 2024-02-22 RX ADMIN — OMEPRAZOLE 20 MG: 20 CAPSULE, DELAYED RELEASE ORAL at 05:54

## 2024-02-22 RX ADMIN — LOSARTAN POTASSIUM 25 MG: 25 TABLET, FILM COATED ORAL at 05:54

## 2024-02-22 NOTE — ASSESSMENT & PLAN NOTE
Continue with pain management and monitoring.  Unable to do the straight leg test on the right side

## 2024-02-22 NOTE — ASSESSMENT & PLAN NOTE
She had hyperparathyroidism with elevated levels and had undergone parathyroidectomy.  This is now stable.

## 2024-02-22 NOTE — ED PROVIDER NOTES
ED Provider Note    CHIEF COMPLAINT  Chief Complaint   Patient presents with    Facial Droop     X1h per pt. Denies h/a or vision changes. Reports also numbness to L lower face.        EXTERNAL RECORDS REVIEWED  Outpatient Notes patient was seen at [facility] urgent [facility] 12/31/23 for coronavirus screening and cold symptoms    HPI/ROS  LIMITATION TO HISTORY   Select: : None  OUTSIDE HISTORIAN(S):  none    ORA Kamila LORENZ is a 53 y.o. female who presents with a droop to the left lower face and numbness to her left lower face that started approximate hour ago while she was working.  The patient denies any headache but states that she has some funny feeling in her head.  She denies any vision changes she denies any upper or lower extremity weakness or numbness.  She denies any loss of coordination or slurred speech.  She denies any vision changes.  She denies any ear pain chest pain or sore throat.  She denies smoking drinking or drug use.    PAST MEDICAL HISTORY   has a past medical history of Anesthesia, Hypertension, Infectious disease, Other specified symptom associated with female genital organs, Pain, Stroke (HCC), and Unspecified disorder of thyroid.    SURGICAL HISTORY   has a past surgical history that includes abdominal hysterectomy total (2006); pelviscopy (9/16/2010); dee by laparoscopy (9/27/2012); abdominoplasty (12/11/2013); and parathyroidectomy (Right, 05/02/2019).    FAMILY HISTORY  Family History   Problem Relation Age of Onset    Diabetes Mother         diet controlled    Hypertension Father     Heart Disease Father         MI age 61    Cancer Father         prostate ca    Cancer Sister         ovarian lesion    Heart Disease Brother         blood clot heart       SOCIAL HISTORY  Social History     Tobacco Use    Smoking status: Never    Smokeless tobacco: Never   Vaping Use    Vaping Use: Never used   Substance and Sexual Activity    Alcohol use: Yes     Comment: glass of wine every six months  "   Drug use: No    Sexual activity: Yes     Partners: Male     Birth control/protection: Surgical       CURRENT MEDICATIONS  Home Medications       Reviewed by Berenice Leach M.D. (Physician) on 02/21/24 at 1721  Med List Status: Not Addressed     Medication Last Dose Status   alendronate (FOSAMAX) 70 MG Tab  Active   Cholecalciferol (VITAMIN D) 2000 UNIT Tab  Active   losartan (COZAAR) 25 MG Tab  Active   MAGNESIUM GLYCINATE PO  Active   Multiple Vitamin (MULTIVITAMIN PO)  Active   omeprazole (PRILOSEC) 20 MG delayed-release capsule  Active   vitamin e (VITAMIN E) 400 UNIT Cap  Active                    ALLERGIES  Allergies   Allergen Reactions    Pork Allergy Itching     itching  Other reaction(s): other  Other reaction(s): other       PHYSICAL EXAM  VITAL SIGNS: BP (!) 160/83   Pulse 84   Temp 36.6 °C (97.8 °F) (Temporal)   Resp 20   Ht 1.549 m (5' 1\")   Wt 78.6 kg (173 lb 4.5 oz)   SpO2 97%   BMI 32.74 kg/m²      Constitutional: Well developed, Well nourished, No acute distress, Non-toxic appearance.   HEENT: Normocephalic, Atraumatic,  external ears normal, pharynx pink,  Mucous  Membranes moist, No rhinorrhea or mucosal edema  Eyes: PERRL, EOMI, Conjunctiva normal, No discharge.   Neck: Normal range of motion, No tenderness, Supple, No stridor.   Lymphatic: No lymphadenopathy    Cardiovascular: Regular Rate and Rhythm, No murmurs,  rubs, or gallops.   Thorax & Lungs: Lungs clear to auscultation bilaterally, No respiratory distress, No wheezes, rhales or rhonchi, No chest wall tenderness.   Abdomen: Bowel sounds normal, Soft, non tender, non distended,  No pulsatile masses., no rebound guarding or peritoneal signs.   Skin: Warm, Dry, No erythema, No rash,   Back:  No CVA tenderness,  No spinal tenderness, bony crepitance step offs or instability.   Extremities: Equal, intact distal pulses, No cyanosis, clubbing or edema,  No tenderness.   Musculoskeletal: Good range of motion in all major joints. No " tenderness to palpation or major deformities noted.   Neurologic: Alert & oriented No focal deficits noted.  Psychiatric: Affect normal, Judgment normal, Mood normal.      DIAGNOSTIC STUDIES / PROCEDURES  EKG  I have independently interpreted this EKG  See below    LABS  Results for orders placed or performed during the hospital encounter of 02/21/24   CBC WITH DIFFERENTIAL   Result Value Ref Range    WBC 6.8 4.8 - 10.8 K/uL    RBC 4.62 4.20 - 5.40 M/uL    Hemoglobin 15.5 12.0 - 16.0 g/dL    Hematocrit 44.4 37.0 - 47.0 %    MCV 96.1 81.4 - 97.8 fL    MCH 33.5 (H) 27.0 - 33.0 pg    MCHC 34.9 32.2 - 35.5 g/dL    RDW 44.4 35.9 - 50.0 fL    Platelet Count 252 164 - 446 K/uL    MPV 9.6 9.0 - 12.9 fL    Neutrophils-Polys 42.50 (L) 44.00 - 72.00 %    Lymphocytes 49.10 (H) 22.00 - 41.00 %    Monocytes 6.20 0.00 - 13.40 %    Eosinophils 1.80 0.00 - 6.90 %    Basophils 0.30 0.00 - 1.80 %    Immature Granulocytes 0.10 0.00 - 0.90 %    Nucleated RBC 0.00 0.00 - 0.20 /100 WBC    Neutrophils (Absolute) 2.87 1.82 - 7.42 K/uL    Lymphs (Absolute) 3.32 1.00 - 4.80 K/uL    Monos (Absolute) 0.42 0.00 - 0.85 K/uL    Eos (Absolute) 0.12 0.00 - 0.51 K/uL    Baso (Absolute) 0.02 0.00 - 0.12 K/uL    Immature Granulocytes (abs) 0.01 0.00 - 0.11 K/uL    NRBC (Absolute) 0.00 K/uL   Comp Metabolic Panel   Result Value Ref Range    Sodium 140 135 - 145 mmol/L    Potassium 3.8 3.6 - 5.5 mmol/L    Chloride 101 96 - 112 mmol/L    Co2 23 20 - 33 mmol/L    Anion Gap 16.0 7.0 - 16.0    Glucose 82 65 - 99 mg/dL    Bun 16 8 - 22 mg/dL    Creatinine 0.75 0.50 - 1.40 mg/dL    Calcium 9.7 8.4 - 10.2 mg/dL    Correct Calcium 9.1 8.5 - 10.5 mg/dL    AST(SGOT) 22 12 - 45 U/L    ALT(SGPT) 25 2 - 50 U/L    Alkaline Phosphatase 94 30 - 99 U/L    Total Bilirubin 0.5 0.1 - 1.5 mg/dL    Albumin 4.7 3.2 - 4.9 g/dL    Total Protein 7.7 6.0 - 8.2 g/dL    Globulin 3.0 1.9 - 3.5 g/dL    A-G Ratio 1.6 g/dL   Prothrombin Time   Result Value Ref Range    PT 12.9 12.0 -  14.6 sec    INR 0.93 0.87 - 1.13   APTT   Result Value Ref Range    APTT 29.5 24.7 - 36.0 sec   TROPONIN   Result Value Ref Range    Troponin T <6 6 - 19 ng/L   COD - Adult (Type and Screen)   Result Value Ref Range    ABO Grouping Only O     Rh Grouping Only POS     Antibody Screen-Cod NEG    ABO Rh Confirm   Result Value Ref Range    ABO Rh Confirm O POS    ESTIMATED GFR   Result Value Ref Range    GFR (CKD-EPI) 95 >60 mL/min/1.73 m 2   EKG (NOW)   Result Value Ref Range    Report       [facility]    Test Date:  2024  Pt Name:    ORA LORENZ               Department: [facility]  MRN:        [id number]                      [facility]       [facility]  Gender:     Female                       Technician: [id number]  :        1970                   Requested By:GERARD VU  Order #:    [id number]                    Reading MD: GERARD VU MD    Measurements  Intervals                                Axis  Rate:       69                           P:          24  NY:         143                          QRS:        6  QRSD:       100                          T:          57  QT:         423  QTc:        454    Interpretive Statements  Sinus rhythm  RSR' in V1 or V2, right VCD or RVH  Baseline wander in lead(s) V3  Compared to ECG 2021 18:16:37  Right ventricular hypertrophy now present  RSR' in V1 or V2 now present  T-wave abnormality no longer present  Electronically Signed On 2024 17:36:49 PST by GERARD VU MD           RADIOLOGY  I have independently interpreted the diagnostic imaging associated with this visit and am waiting the final reading from the radiologist.   My preliminary interpretation is as follows: cxr no infiltrate or pleural effusion  Radiologist interpretation:   CT-CTA NECK WITH & W/O-POST PROCESSING   Final Result      CT angiogram of the neck within normal limits.      CT-CEREBRAL PERFUSION ANALYSIS   Final Result      1.  Cerebral blood flow  less than 30% likely representing completed infarct = 0 mL.      2.  T Max more than 6 seconds likely representing combination of completed infarct and ischemia = 0 mL.      3.  Mismatched volume likely representing ischemic brain/penumbra = None      4.  Please note that the cerebral perfusion was performed on the limited brain tissue around the basal ganglia region. Infarct/ischemia outside the CT perfusion sections can be missed in this study.      CT-CTA HEAD WITH & W/O-POST PROCESS   Final Result      1.  No evidence of intracranial vascular occlusion.      2.  Possible tiny 1 mm aneurysm emanating from the right middle cerebral artery distribution from an M2 branch.      DX-CHEST-PORTABLE (1 VIEW)   Final Result      No evidence of acute cardiopulmonary process.      CT-HEAD W/O   Final Result      No evidence of acute intracranial process.         MR-BRAIN-W/O    (Results Pending)   EC-ECHOCARDIOGRAM COMPLETE W/ CONT    (Results Pending)        COURSE & MEDICAL DECISION MAKING    ED Observation Status? No; Patient does not meet criteria for ED Observation.     INITIAL ASSESSMENT, COURSE AND PLAN  Care Narrative: ORA LORENZ is a 53 y.o. female who presents with a droop to the left lower face and numbness to her left lower face that started approximate hour ago while she was working.  The patient denies any headache but states that she has some funny feeling in her head.  She denies any vision changes she denies any upper or lower extremity weakness or numbness.  She denies any loss of coordination or slurred speech.  She denies any vision changes.  She denies any ear pain chest pain or sore throat.  She denies smoking drinking or drug use.  On physical exam the patient is alert awake in no acute distress she has a left lower facial droop and numbness.  She can raise her eyebrows normally and has no difficulty blinking her left eye.  She has equal strength and sensation upper and lower extremities  bilaterally she does have some right lateral leg and foot numbness from a back injury.  NIH is 2.  Heart is regular rhythm lungs are clear to auscultation bilaterally abdomen is soft     [place] diagnosis: CVA, TIA, Bell's palsy  ADDITIONAL PROBLEM LIST  Parathyroidectomy  Old stroke  Hypertension  Back injury and sciatica  DISPOSITION AND DISCUSSIONS    An IV was started and labs were drawn I ordered a CTA of the head neck as well as a perfusion study to further evaluate her symptoms.    Patient's white blood cell count is normal at 6.8 hemoglobin normal at 15.5 platelet count 252 which is normal.  She is 42% neutrophils and otherwise normal differential.  Comprehensive metabolic panel has normal electrolytes normal kidney function normal glucose normal anion gap and normal liver function test.  Patient's troponin is less than 6 and her EKG has no ischemic changes.  This gives her a heart score of 2.  Coags are normal.    The patient's chest x-ray shows no infiltrate or pleural effusion.  CT of the head shows no intracranial hemorrhage or mass effect.  Perfusion CT does not show any mismatch.  CTA of the head shows a possible tiny 1 mm aneurysm on the right middle cerebral artery distribution on the M2 branch CT of the angiogram of the neck is normal.    I spoke with neurology on-call Dr. Bowman and because of her low NIH score we will not give her alteplase at this time.  She will be admitted to the hospital for an MRI and further neurologic exams.  I spoke with the hospitalist Dr. Leach who accepts the patient for admission.      I have discussed management of the patient with the following physicians and ALYSSA's: Neurology Dr. Bowman who states that the patient does not need alteplase and can be admitted to [facility] for MRI of the brain and further neuro exams    Hospitalist Dr. Leach who will admit the patient for further workup    Discussion of management with other [facility] or appropriate  source(s): None     Escalation of care considered, and ultimately not performed: alteplase was considered but the patient does not meet criteria as her NIH is too low    Barriers to care at this time, including but not limited to: none.     Decision tools and prescription drugs considered including, but not limited to: NIH Stroke Scale 2 and HEART Score 2 .    Patient will be admitted in guarded condition.    FINAL DIAGNOSIS  1. Facial droop           Electronically signed by: Yumiko Bar M.D., 2/21/2024 4:02 PM

## 2024-02-22 NOTE — CARE PLAN
The patient is Stable - Low risk of patient condition declining or worsening    Shift Goals  Clinical Goals: neuro checks q4h  Patient Goals: echo and home in am    Progress made toward(s) clinical / shift goals:  Left facial droop and numbness appear to be improving. Pt aware of plan for echo and routine labs in am.       Problem: Knowledge Deficit - Standard  Goal: Patient and family/care givers will demonstrate understanding of plan of care, disease process/condition, diagnostic tests and medications  Outcome: Progressing     Problem: Neuro Status  Goal: Neuro status will remain stable or improve  Outcome: Progressing

## 2024-02-22 NOTE — ASSESSMENT & PLAN NOTE
Body mass index is 32.74 kg/m².  Outpatient weight loss management program lifestyle modification highly recommended.

## 2024-02-22 NOTE — H&P
Hospital Medicine History & Physical Note    Date of Service  2/21/2024    Primary Care Physician  Librado Villanueva D.O.    Consultants  neurology      Code Status  Full Code    Chief Complaint  Chief Complaint   Patient presents with    Facial Droop     X1h per pt. Denies h/a or vision changes. Reports also numbness to L lower face.        History of Presenting Illness  ORA LORENZ is a 53 y.o. female who presented 2/21/2024 with sudden oral droop on the left side.  This was noticed by her coworkers as she came in to drop off something.  She usually works in the emergency room.  Upon seeing the numbness by several nurses the patient was placed into an emergency room bed and evaluated emergently for code stroke.  CT of the head is normal.  Perfusion scan is normal.  CT of the neck is normal.  CT of the head shows right middle cerebral artery M2 segment small 1 mm aneurysm that was not there on the previous MRI about 6 months ago.  The patient at this point will need.  Neurological monitoring, aspirin and statin.  Eventually she will need outpatient follow-ups with repeated MRIs for her aneurysm.    I discussed the plan of care with patient, bedside RN, and emergency room physician Dr. Yumiko Bar .    Review of Systems  Review of Systems   Constitutional: Negative.  Negative for chills, diaphoresis and fever.   HENT: Negative.          Lymph node enlargement in the neck   Eyes: Negative.  Negative for double vision.   Respiratory: Negative.  Negative for cough, hemoptysis and wheezing.    Cardiovascular: Negative.  Negative for chest pain, palpitations and leg swelling.   Gastrointestinal: Negative.  Negative for abdominal pain, blood in stool, constipation, diarrhea, heartburn, nausea and vomiting.   Genitourinary: Negative.  Negative for frequency, hematuria and urgency.   Musculoskeletal:  Positive for back pain. Negative for joint pain.   Skin: Negative.  Negative for itching and rash.   Neurological:   Positive for sensory change, speech change and focal weakness. Negative for dizziness, seizures, loss of consciousness and headaches.   Endo/Heme/Allergies: Negative.  Does not bruise/bleed easily.   Psychiatric/Behavioral: Negative.  Negative for suicidal ideas. The patient is not nervous/anxious.    All other systems reviewed and are negative.      Past Medical History   has a past medical history of Anesthesia, Hypertension, Infectious disease, Other specified symptom associated with female genital organs, Pain, Stroke (HCC), and Unspecified disorder of thyroid.    Surgical History   has a past surgical history that includes abdominal hysterectomy total (2006); pelviscopy (9/16/2010); dee by laparoscopy (9/27/2012); abdominoplasty (12/11/2013); and parathyroidectomy (Right, 05/02/2019).     Family History  family history includes Cancer in her father and sister; Diabetes in her mother; Heart Disease in her brother and father; Hypertension in her father.   Family history reviewed with patient. There is no family history that is pertinent to the chief complaint.     Social History   reports that she has never smoked. She has never used smokeless tobacco. She reports current alcohol use. She reports that she does not use drugs.    Allergies  Allergies   Allergen Reactions    Pork Allergy Itching     itching  Other reaction(s): other  Other reaction(s): other       Medications  Prior to Admission Medications   Prescriptions Last Dose Informant Patient Reported? Taking?   Cholecalciferol (VITAMIN D) 2000 UNIT Tab   Yes No   Sig: Take 2,000 Units by mouth every day.   MAGNESIUM GLYCINATE PO   Yes No   Multiple Vitamin (MULTIVITAMIN PO)  Patient Yes No   Sig: Take 1 Tab by mouth every day.   alendronate (FOSAMAX) 70 MG Tab   No No   Sig: Take 1 Tablet by mouth every 7 days.   losartan (COZAAR) 25 MG Tab   No No   Sig: Take 1 Tablet by mouth every day. FOR 90 DAYS   omeprazole (PRILOSEC) 20 MG delayed-release capsule    Yes No   Sig: Take 20 mg by mouth every day.   vitamin e (VITAMIN E) 400 UNIT Cap   Yes No   Sig: vitamin E 400 unit capsule   Take 1 capsule every day by oral route.      Facility-Administered Medications: None       Physical Exam  Temp:  [36.6 °C (97.8 °F)] 36.6 °C (97.8 °F)  Pulse:  [83-87] 84  Resp:  [20] 20  BP: (159-160)/() 160/83  SpO2:  [97 %-98 %] 97 %  Blood Pressure: (!) 160/83   Temperature: 36.6 °C (97.8 °F)   Pulse: 84   Respiration: 20   Pulse Oximetry: 97 %       Physical Exam  Vitals and nursing note reviewed. Exam conducted with a chaperone present.   Constitutional:       General: She is awake.      Appearance: Normal appearance. She is well-developed and well-groomed. She is obese. She is ill-appearing.   HENT:      Head: Normocephalic and atraumatic.      Jaw: There is normal jaw occlusion. No trismus.      Salivary Glands: Right salivary gland is not tender. Left salivary gland is not tender.      Right Ear: External ear normal.      Left Ear: External ear normal.      Mouth/Throat:      Mouth: Mucous membranes are moist.      Pharynx: Oropharynx is clear.   Eyes:      General: Lids are normal. Vision grossly intact.      Extraocular Movements: Extraocular movements intact.      Conjunctiva/sclera: Conjunctivae normal.      Right eye: Right conjunctiva is not injected. No exudate.     Left eye: Left conjunctiva is not injected. No exudate.     Pupils: Pupils are equal, round, and reactive to light.   Neck:      Thyroid: No thyroid mass.      Vascular: No hepatojugular reflux or JVD.      Trachea: No abnormal tracheal secretions or tracheal deviation.   Cardiovascular:      Rate and Rhythm: Normal rate and regular rhythm. Occasional Extrasystoles are present.     Pulses: Normal pulses.      Heart sounds: Normal heart sounds. No murmur heard.     No friction rub.   Pulmonary:      Effort: Pulmonary effort is normal.      Breath sounds: Examination of the right-lower field reveals decreased  breath sounds. Examination of the left-lower field reveals decreased breath sounds. Decreased breath sounds present. No wheezing or rhonchi.   Abdominal:      General: Abdomen is flat.      Palpations: Abdomen is soft.      Tenderness: There is no abdominal tenderness. There is no right CVA tenderness or left CVA tenderness.      Hernia: No hernia is present.   Musculoskeletal:      Cervical back: Full passive range of motion without pain, normal range of motion and neck supple. No rigidity. No muscular tenderness.      Right lower leg: No edema.      Left lower leg: No edema.   Lymphadenopathy:      Head:      Right side of head: No submental adenopathy.      Left side of head: No submental adenopathy.      Cervical:      Right cervical: No superficial cervical adenopathy.     Left cervical: No superficial cervical adenopathy.      Upper Body:      Right upper body: No supraclavicular adenopathy.      Left upper body: No supraclavicular adenopathy.   Skin:     General: Skin is warm and dry.      Capillary Refill: Capillary refill takes 2 to 3 seconds.      Coloration: Skin is not cyanotic or pale.      Findings: No abrasion or bruising.   Neurological:      General: No focal deficit present.      Mental Status: She is alert and oriented to person, place, and time. Mental status is at baseline.      GCS: GCS eye subscore is 4. GCS verbal subscore is 5. GCS motor subscore is 6.      Cranial Nerves: Facial asymmetry present. No cranial nerve deficit.      Sensory: No sensory deficit.      Motor: Motor function is intact.      Deep Tendon Reflexes:      Reflex Scores:       Tricep reflexes are 2+ on the right side and 2+ on the left side.       Bicep reflexes are 2+ on the right side and 2+ on the left side.       Brachioradialis reflexes are 2+ on the right side and 2+ on the left side.       Patellar reflexes are 2+ on the right side and 2+ on the left side.       Achilles reflexes are 2+ on the right side and 2+ on  "the left side.     Comments: Numbness on the left side of the face  Abnormal straight leg test on the right side   Psychiatric:         Attention and Perception: Attention and perception normal.         Mood and Affect: Mood normal.         Speech: Speech normal.         Behavior: Behavior is cooperative.         Thought Content: Thought content normal.         Cognition and Memory: Cognition and memory normal.         Judgment: Judgment normal.         Laboratory:  Recent Labs     02/21/24  1615   WBC 6.8   RBC 4.62   HEMOGLOBIN 15.5   HEMATOCRIT 44.4   MCV 96.1   MCH 33.5*   MCHC 34.9   RDW 44.4   PLATELETCT 252   MPV 9.6     Recent Labs     02/21/24  1310 02/21/24  1615   SODIUM 142 140   POTASSIUM 4.1 3.8   CHLORIDE 105 101   CO2 23 23   GLUCOSE 126* 82   BUN 18 16   CREATININE 0.67 0.75   CALCIUM 9.2 9.7     Recent Labs     02/21/24  1310 02/21/24  1615   ALTSGPT 22 25   ASTSGOT 20 22   ALKPHOSPHAT 87 94   TBILIRUBIN 0.5 0.5   GLUCOSE 126* 82     Recent Labs     02/21/24  1615   APTT 29.5   INR 0.93     No results for input(s): \"NTPROBNP\" in the last 72 hours.      Recent Labs     02/21/24  1615   TROPONINT <6       Imaging:  CT-CTA NECK WITH & W/O-POST PROCESSING   Final Result      CT angiogram of the neck within normal limits.      CT-CEREBRAL PERFUSION ANALYSIS   Final Result      1.  Cerebral blood flow less than 30% likely representing completed infarct = 0 mL.      2.  T Max more than 6 seconds likely representing combination of completed infarct and ischemia = 0 mL.      3.  Mismatched volume likely representing ischemic brain/penumbra = None      4.  Please note that the cerebral perfusion was performed on the limited brain tissue around the basal ganglia region. Infarct/ischemia outside the CT perfusion sections can be missed in this study.      CT-CTA HEAD WITH & W/O-POST PROCESS   Final Result      1.  No evidence of intracranial vascular occlusion.      2.  Possible tiny 1 mm aneurysm emanating " from the right middle cerebral artery distribution from an M2 branch.      DX-CHEST-PORTABLE (1 VIEW)   Final Result      No evidence of acute cardiopulmonary process.      CT-HEAD W/O   Final Result      No evidence of acute intracranial process.         MR-BRAIN-W/O    (Results Pending)   EC-ECHOCARDIOGRAM COMPLETE W/ CONT    (Results Pending)       X-Ray:  I have personally reviewed the images and compared with prior images.  EKG:  I have personally reviewed the images and compared with prior images.    Assessment/Plan:  Justification for Admission Status  I anticipate this patient is appropriate for observation status at this time because seems to be suffering from a TIA-like event.  Patient with MRI echocardiogram monitoring.    Patient will need a Telemetry bed on MEDICAL service .  The need is secondary to TIA.    * TIA (transient ischemic attack)- (present on admission)  Assessment & Plan  Patient today has suddenly developed left sided facial drooping.  There is also numbness in the face on the left side.  Initial CT of the head is normal  CT of the head and neck are normal except for a 1 mm right middle cerebral M2 segment aneurysm  Neurology was consulted at this point they say to monitor the aneurysm no acute intervention needed  They do recommend an MRI  Echocardiogram has been ordered  Optimize aspirin and statin use  Neuro monitoring    Essential hypertension- (present on admission)  Assessment & Plan  Optimize blood pressure management  Continue as needed labetalol  Continue losartan 25 mg daily    Low back pain with right-sided sciatica- (present on admission)  Assessment & Plan  Continue with pain management and monitoring.  Unable to do the straight leg test on the right side    Obesity (BMI 30-39.9)- (present on admission)  Assessment & Plan  Body mass index is 32.74 kg/m².  Outpatient weight loss management program lifestyle modification highly recommended.    S/P parathyroidectomy (HCC)-  (present on admission)  Assessment & Plan  She had hyperparathyroidism with elevated levels and had undergone parathyroidectomy.  This is now stable.    Dyslipidemia- (present on admission)  Assessment & Plan  Low-fat low-cholesterol diet  Statin  Fasting lipid panel    Vitamin D deficiency- (present on admission)  Assessment & Plan  Vitamin D supplementation        VTE prophylaxis: SCDs/TEDs

## 2024-02-22 NOTE — PROGRESS NOTES
12-hour chart check complete.    Monitor Summary  Rhythm: SR  Rate: 60s-70s  Ectopy: rare PVCs and PACs  Measurements: 0.14/0.10/0.40

## 2024-02-22 NOTE — ASSESSMENT & PLAN NOTE
Patient today has suddenly developed left sided facial drooping.  There is also numbness in the face on the left side.  Initial CT of the head is normal  CT of the head and neck are normal except for a 1 mm right middle cerebral M2 segment aneurysm  Neurology was consulted at this point they say to monitor the aneurysm no acute intervention needed  They do recommend an MRI  Echocardiogram has been ordered  Optimize aspirin and statin use  Neuro monitoring

## 2024-02-22 NOTE — PROGRESS NOTES
4 Eyes Skin Assessment Completed by PRINCESS Loyola and PRINCESS Veliz.    Head WDL  Ears WDL  Nose WDL  Mouth WDL  Neck WDL  Breast/Chest WDL  Shoulder Blades WDL  Spine WDL  (R) Arm/Elbow/Hand WDL  (L) Arm/Elbow/Hand WDL  Abdomen WDL  Groin WDL  Scrotum/Coccyx/Buttocks Not Assessed  (R) Leg WDL  (L) Leg WDL  (R) Heel/Foot/Toe WDL  (L) Heel/Foot/Toe WDL          Devices In Places ECG, Blood Pressure Cuff, and Pulse Ox      Interventions In Place Pillows and Low Air Loss Mattress    Possible Skin Injury No    Pictures Uploaded Into Epic N/A  Wound Consult Placed N/A  RN Wound Prevention Protocol Ordered No

## 2024-02-22 NOTE — DISCHARGE SUMMARY
Discharge Summary    CHIEF COMPLAINT ON ADMISSION  Chief Complaint   Patient presents with    Facial Droop     X1h per pt. Denies h/a or vision changes. Reports also numbness to L lower face.        Reason for Admission  Facial Numbness, Facial Droop     Admission Date  2/21/2024    CODE STATUS  Full Code    HPI & HOSPITAL COURSE  This is a 53 y.o. female here left-sided facial droop with numbness in the lower half of her face.  Started about an hour ago.  She was having some funny feeling in the left side of her cheek and when she came into the emergency room to drop something off, her coworkers commented on the left-sided facial droop.  No vision changes no pain.  She can raise her eyebrow and no significant ptosis to where she cannot close her left eye.  This was discussed with the on-call neurologist who recommended she get an MRI for further workup.  CT of the head was normal perfusion scan normal CTA of the neck normal but the CT of the head shows a possible tiny 1 mm aneurysm right middle cerebral artery and the M2 branch.  Patient was admitted to the telemetry floor without significant abnormality 2D echocardiogram showed a normal ejection fraction, grade 2 diastolic dysfunction, moderately dilated left atrium, evidence of early right to left shunt suggestive of PFO.  MRI of the brain shows no acute stroke, mild chronic microvascular ischemic disease, mild tonsillar ectopia.  Given patient's physical exam with the consistent left-sided weakness of the lower face as well as decreased sensation high suspicion this is Bell's palsy and patient will be placed on a 7-day course of prednisone 60 mg p.o. daily without need for taper.  She is already been in contact with her primary care practitioner who states that he will have her follow-up with neurology.  I do not believe that this was TIA nor stroke given the negative MRI.  Therefore I did not place her on an aspirin or statin.  She will follow-up with her  primary care practitioner as well as neurology.  Given the atrial septal defect she will also follow-up with cardiology for further evaluation though intervention likely not necessary.    Therefore, she is discharged in good and stable condition to home with close outpatient follow-up.      Discharge Date  2/22/2024    FOLLOW UP ITEMS POST DISCHARGE  PCP, neurology, cardiology    DISCHARGE DIAGNOSES  Principal Problem:    TIA (transient ischemic attack) (POA: Yes)  Active Problems:    Vitamin D deficiency (POA: Yes)    Essential hypertension (POA: Yes)      Overview: BP is controlled with losartan 25mg     Dyslipidemia (Chronic) (POA: Yes)      Overview: 12/27/14 chol 197,trig 95,hdl 65,ldl 113      11/13/17 chol 194,trig 81,hdl 76,ldl 102    S/P parathyroidectomy (HCC) (POA: Yes)    Obesity (BMI 30-39.9) (POA: Yes)      Overview: -patient over the past 2 years has gained 48 pounds       -due her leg condition she has not been able to exercise as she used.       -currently around 180lbs             -tries to eat healthy             7/21/23      -original weight: 180lbs with clothes, BMI 34.16      -current weight: 172lbs with clothes, BMI 32.      -patient lost 8lbs       -patient was started on phent 15mg previously, she only complains of       tolerable dry mouth      -patient is also consuming foods with prebiotics       -patient is also walking            9/1/23      -original weight: 180lbs with clothes, BMI 34.16      -current weight: 172lbs with clothes, BMI 32.      -she works out 3x/week. She does 20 minutes of cardio followed by weights       after with alternating with lower body and upper body             10/17/23      -original weight: 180lbs with clothes, BMI 34.16      -current weight: 166.8lbs without clothes, BMI 31.52      -patient has lost roughly 5 lbs       -patient is staying active, she was traveling for the past couple of weeks       but was hiking quite bit                   12/6/23       -Original weight was close 180 pounds      - Current weight without clothes 163 pounds      -Patient has been off phentermine for 1 month      - She is been unable to exercise for the past 1 week due to being busy       with work      - She is starting to count her macros and is shifting her diet    Low back pain with right-sided sciatica (POA: Yes)  Resolved Problems:    * No resolved hospital problems. *      FOLLOW UP  Future Appointments   Date Time Provider Department Center   3/19/2024  1:40 PM STEVIE Doherty   4/12/2024  8:20 AM Luis Antonio Henry M.D. [facility] None     Librado Villanueva D.O.  [address] [id number] [phone]    Follow up        MEDICATIONS ON DISCHARGE     Medication List        START taking these medications        Instructions   predniSONE 20 MG Tabs  Commonly known as: Deltasone   Take 3 Tablets by mouth every day for 7 days.  Dose: 60 mg            CONTINUE taking these medications        Instructions   alendronate 70 MG Tabs  Commonly known as: Fosamax   Take 1 Tablet by mouth every 7 days.  Dose: 70 mg     losartan 25 MG Tabs  Commonly known as: Cozaar   Take 1 Tablet by mouth every day. FOR 90 DAYS  Dose: 25 mg     MAGNESIUM GLYCINATE PO   every day at 6 PM.     methocarbamol 500 MG Tabs  Commonly known as: Robaxin   Take 1,000 mg by mouth 3 times a day as needed (back pain).  Dose: 1,000 mg     MULTIVITAMIN PO   Take 1 Tab by mouth every day.  Dose: 1 Tablet     omeprazole 20 MG delayed-release capsule  Commonly known as: PriLOSEC   Take 20 mg by mouth every day.  Dose: 20 mg     Tums Extra Strength 750 750 MG chewable tablet  Generic drug: calcium carbonate   Chew 1 Tablet as needed (leg cramps).  Dose: 1 Tablet     vitamin D 2000 UNIT Tabs   Take 2,000 Units by mouth every day.  Dose: 2,000 Units     vitamin e 400 UNIT Caps  Commonly known as: Vitamin E   vitamin E 400 unit capsule   Take 1 capsule every day by oral route.               Allergies  Allergies   Allergen Reactions    Pork Allergy Itching     itching  Other reaction(s): other  Other reaction(s): other       DIET  Orders Placed This Encounter   Procedures    Diet Order Diet: Regular     Standing Status:   Standing     Number of Occurrences:   1     Order Specific Question:   Diet:     Answer:   Regular [1]       ACTIVITY  As tolerated.  Weight bearing as tolerated    CONSULTATIONS  None    PROCEDURES  None    LABORATORY  Lab Results   Component Value Date    SODIUM 140 02/22/2024    POTASSIUM 3.9 02/22/2024    CHLORIDE 105 02/22/2024    CO2 23 02/22/2024    GLUCOSE 97 02/22/2024    BUN 15 02/22/2024    CREATININE 0.70 02/22/2024    CREATININE 0.91 02/04/2011    GLOMRATE >59 02/04/2011        Lab Results   Component Value Date    WBC 5.7 02/22/2024    WBC 5.1 02/04/2011    HEMOGLOBIN 13.8 02/22/2024    HEMATOCRIT 39.5 02/22/2024    PLATELETCT 219 02/22/2024        Total time of the discharge process exceeds 38 minutes.

## 2024-02-23 LAB
COLLAGEN CTX SERPL-MCNC: 69 PG/ML
ENA SS-B IGG SER IA-ACNC: 0 AU/ML (ref 0–40)
NUCLEAR IGG SER QL IA: NORMAL
SSA52 R0ENA AB IGG Q0420: 1 AU/ML (ref 0–40)
SSA60 R0ENA AB IGG Q0419: 0 AU/ML (ref 0–40)

## 2024-02-24 ENCOUNTER — APPOINTMENT (OUTPATIENT)
Dept: RADIOLOGY | Facility: MEDICAL CENTER | Age: 54
End: 2024-02-24
Attending: EMERGENCY MEDICINE
Payer: COMMERCIAL

## 2024-02-24 ENCOUNTER — HOSPITAL ENCOUNTER (OUTPATIENT)
Facility: MEDICAL CENTER | Age: 54
End: 2024-02-25
Attending: EMERGENCY MEDICINE | Admitting: STUDENT IN AN ORGANIZED HEALTH CARE EDUCATION/TRAINING PROGRAM
Payer: COMMERCIAL

## 2024-02-24 DIAGNOSIS — R07.9 CHEST PAIN, UNSPECIFIED TYPE: ICD-10-CM

## 2024-02-24 DIAGNOSIS — G51.0 BELL'S PALSY: ICD-10-CM

## 2024-02-24 LAB
ANION GAP SERPL CALC-SCNC: 14 MMOL/L (ref 7–16)
BASOPHILS # BLD AUTO: 0.2 % (ref 0–1.8)
BASOPHILS # BLD: 0.02 K/UL (ref 0–0.12)
BUN SERPL-MCNC: 22 MG/DL (ref 8–22)
CALCIUM SERPL-MCNC: 9.5 MG/DL (ref 8.5–10.5)
CHLORIDE SERPL-SCNC: 106 MMOL/L (ref 96–112)
CO2 SERPL-SCNC: 21 MMOL/L (ref 20–33)
CREAT SERPL-MCNC: 0.68 MG/DL (ref 0.5–1.4)
EKG IMPRESSION: NORMAL
EOSINOPHIL # BLD AUTO: 0 K/UL (ref 0–0.51)
EOSINOPHIL NFR BLD: 0 % (ref 0–6.9)
ERYTHROCYTE [DISTWIDTH] IN BLOOD BY AUTOMATED COUNT: 44.4 FL (ref 35.9–50)
GFR SERPLBLD CREATININE-BSD FMLA CKD-EPI: 104 ML/MIN/1.73 M 2
GLUCOSE SERPL-MCNC: 209 MG/DL (ref 65–99)
HCT VFR BLD AUTO: 43 % (ref 37–47)
HGB BLD-MCNC: 15 G/DL (ref 12–16)
IMM GRANULOCYTES # BLD AUTO: 0.08 K/UL (ref 0–0.11)
IMM GRANULOCYTES NFR BLD AUTO: 0.8 % (ref 0–0.9)
LYMPHOCYTES # BLD AUTO: 2.25 K/UL (ref 1–4.8)
LYMPHOCYTES NFR BLD: 21.8 % (ref 22–41)
MCH RBC QN AUTO: 33 PG (ref 27–33)
MCHC RBC AUTO-ENTMCNC: 34.9 G/DL (ref 32.2–35.5)
MCV RBC AUTO: 94.7 FL (ref 81.4–97.8)
MONOCYTES # BLD AUTO: 0.46 K/UL (ref 0–0.85)
MONOCYTES NFR BLD AUTO: 4.4 % (ref 0–13.4)
NEUTROPHILS # BLD AUTO: 7.53 K/UL (ref 1.82–7.42)
NEUTROPHILS NFR BLD: 72.8 % (ref 44–72)
NRBC # BLD AUTO: 0 K/UL
NRBC BLD-RTO: 0 /100 WBC (ref 0–0.2)
PLATELET # BLD AUTO: 268 K/UL (ref 164–446)
PMV BLD AUTO: 9.6 FL (ref 9–12.9)
POTASSIUM SERPL-SCNC: 3.8 MMOL/L (ref 3.6–5.5)
RBC # BLD AUTO: 4.54 M/UL (ref 4.2–5.4)
SODIUM SERPL-SCNC: 141 MMOL/L (ref 135–145)
TROPONIN T SERPL-MCNC: 7 NG/L (ref 6–19)
WBC # BLD AUTO: 10.3 K/UL (ref 4.8–10.8)

## 2024-02-24 PROCEDURE — 70450 CT HEAD/BRAIN W/O DYE: CPT

## 2024-02-24 PROCEDURE — G0378 HOSPITAL OBSERVATION PER HR: HCPCS

## 2024-02-24 PROCEDURE — 83880 ASSAY OF NATRIURETIC PEPTIDE: CPT

## 2024-02-24 PROCEDURE — 93005 ELECTROCARDIOGRAM TRACING: CPT | Performed by: EMERGENCY MEDICINE

## 2024-02-24 PROCEDURE — 700102 HCHG RX REV CODE 250 W/ 637 OVERRIDE(OP): Performed by: EMERGENCY MEDICINE

## 2024-02-24 PROCEDURE — 85025 COMPLETE CBC W/AUTO DIFF WBC: CPT

## 2024-02-24 PROCEDURE — 93005 ELECTROCARDIOGRAM TRACING: CPT

## 2024-02-24 PROCEDURE — 36415 COLL VENOUS BLD VENIPUNCTURE: CPT

## 2024-02-24 PROCEDURE — A9270 NON-COVERED ITEM OR SERVICE: HCPCS | Performed by: EMERGENCY MEDICINE

## 2024-02-24 PROCEDURE — 80061 LIPID PANEL: CPT

## 2024-02-24 PROCEDURE — 99223 1ST HOSP IP/OBS HIGH 75: CPT | Performed by: STUDENT IN AN ORGANIZED HEALTH CARE EDUCATION/TRAINING PROGRAM

## 2024-02-24 PROCEDURE — 84484 ASSAY OF TROPONIN QUANT: CPT

## 2024-02-24 PROCEDURE — 80048 BASIC METABOLIC PNL TOTAL CA: CPT

## 2024-02-24 PROCEDURE — 99285 EMERGENCY DEPT VISIT HI MDM: CPT

## 2024-02-24 RX ORDER — ASPIRIN 81 MG/1
81 TABLET ORAL DAILY
Status: DISCONTINUED | OUTPATIENT
Start: 2024-02-25 | End: 2024-02-25

## 2024-02-24 RX ORDER — AMOXICILLIN 250 MG
2 CAPSULE ORAL EVERY EVENING
Status: DISCONTINUED | OUTPATIENT
Start: 2024-02-25 | End: 2024-02-25 | Stop reason: HOSPADM

## 2024-02-24 RX ORDER — PROMETHAZINE HYDROCHLORIDE 25 MG/1
12.5-25 TABLET ORAL EVERY 4 HOURS PRN
Status: DISCONTINUED | OUTPATIENT
Start: 2024-02-24 | End: 2024-02-25 | Stop reason: HOSPADM

## 2024-02-24 RX ORDER — PROMETHAZINE HYDROCHLORIDE 25 MG/1
12.5-25 SUPPOSITORY RECTAL EVERY 4 HOURS PRN
Status: DISCONTINUED | OUTPATIENT
Start: 2024-02-24 | End: 2024-02-25 | Stop reason: HOSPADM

## 2024-02-24 RX ORDER — POLYETHYLENE GLYCOL 3350 17 G/17G
1 POWDER, FOR SOLUTION ORAL
Status: DISCONTINUED | OUTPATIENT
Start: 2024-02-24 | End: 2024-02-25 | Stop reason: HOSPADM

## 2024-02-24 RX ORDER — LABETALOL HYDROCHLORIDE 5 MG/ML
10 INJECTION, SOLUTION INTRAVENOUS ONCE
Status: DISCONTINUED | OUTPATIENT
Start: 2024-02-24 | End: 2024-02-25 | Stop reason: HOSPADM

## 2024-02-24 RX ORDER — REGADENOSON 0.08 MG/ML
0.4 INJECTION, SOLUTION INTRAVENOUS
Status: DISCONTINUED | OUTPATIENT
Start: 2024-02-24 | End: 2024-02-25 | Stop reason: HOSPADM

## 2024-02-24 RX ORDER — ACETAMINOPHEN 325 MG/1
650 TABLET ORAL EVERY 6 HOURS PRN
Status: DISCONTINUED | OUTPATIENT
Start: 2024-02-24 | End: 2024-02-25 | Stop reason: HOSPADM

## 2024-02-24 RX ORDER — ONDANSETRON 2 MG/ML
4 INJECTION INTRAMUSCULAR; INTRAVENOUS EVERY 4 HOURS PRN
Status: DISCONTINUED | OUTPATIENT
Start: 2024-02-24 | End: 2024-02-25 | Stop reason: HOSPADM

## 2024-02-24 RX ORDER — ATORVASTATIN CALCIUM 80 MG/1
80 TABLET, FILM COATED ORAL EVERY EVENING
Status: DISCONTINUED | OUTPATIENT
Start: 2024-02-25 | End: 2024-02-25 | Stop reason: HOSPADM

## 2024-02-24 RX ORDER — ONDANSETRON 4 MG/1
4 TABLET, ORALLY DISINTEGRATING ORAL EVERY 4 HOURS PRN
Status: DISCONTINUED | OUTPATIENT
Start: 2024-02-24 | End: 2024-02-25 | Stop reason: HOSPADM

## 2024-02-24 RX ORDER — AMINOPHYLLINE 25 MG/ML
100 INJECTION, SOLUTION INTRAVENOUS
Status: DISCONTINUED | OUTPATIENT
Start: 2024-02-24 | End: 2024-02-25 | Stop reason: HOSPADM

## 2024-02-24 RX ORDER — PROCHLORPERAZINE EDISYLATE 5 MG/ML
5-10 INJECTION INTRAMUSCULAR; INTRAVENOUS EVERY 4 HOURS PRN
Status: DISCONTINUED | OUTPATIENT
Start: 2024-02-24 | End: 2024-02-25 | Stop reason: HOSPADM

## 2024-02-24 RX ORDER — ASPIRIN 81 MG/1
162 TABLET, CHEWABLE ORAL ONCE
Status: COMPLETED | OUTPATIENT
Start: 2024-02-24 | End: 2024-02-24

## 2024-02-24 RX ORDER — MORPHINE SULFATE 4 MG/ML
2-4 INJECTION INTRAVENOUS
Status: DISCONTINUED | OUTPATIENT
Start: 2024-02-24 | End: 2024-02-25 | Stop reason: HOSPADM

## 2024-02-24 RX ORDER — NITROGLYCERIN 0.4 MG/1
0.4 TABLET SUBLINGUAL
Status: DISCONTINUED | OUTPATIENT
Start: 2024-02-24 | End: 2024-02-25 | Stop reason: HOSPADM

## 2024-02-24 RX ORDER — LABETALOL HYDROCHLORIDE 5 MG/ML
10 INJECTION, SOLUTION INTRAVENOUS EVERY 4 HOURS PRN
Status: DISCONTINUED | OUTPATIENT
Start: 2024-02-24 | End: 2024-02-25 | Stop reason: HOSPADM

## 2024-02-24 RX ADMIN — ASPIRIN 81 MG 162 MG: 81 TABLET ORAL at 23:09

## 2024-02-24 ASSESSMENT — FIBROSIS 4 INDEX: FIB4 SCORE: 0.85

## 2024-02-25 ENCOUNTER — APPOINTMENT (OUTPATIENT)
Dept: RADIOLOGY | Facility: MEDICAL CENTER | Age: 54
End: 2024-02-25
Attending: HOSPITALIST
Payer: COMMERCIAL

## 2024-02-25 ENCOUNTER — APPOINTMENT (OUTPATIENT)
Dept: RADIOLOGY | Facility: MEDICAL CENTER | Age: 54
End: 2024-02-25
Attending: STUDENT IN AN ORGANIZED HEALTH CARE EDUCATION/TRAINING PROGRAM
Payer: COMMERCIAL

## 2024-02-25 VITALS
HEART RATE: 72 BPM | HEIGHT: 60 IN | OXYGEN SATURATION: 94 % | RESPIRATION RATE: 14 BRPM | TEMPERATURE: 98.8 F | WEIGHT: 177.25 LBS | DIASTOLIC BLOOD PRESSURE: 82 MMHG | BODY MASS INDEX: 34.8 KG/M2 | SYSTOLIC BLOOD PRESSURE: 135 MMHG

## 2024-02-25 PROBLEM — G51.0 BELL'S PALSY: Status: ACTIVE | Noted: 2024-02-25

## 2024-02-25 PROBLEM — R73.9 HYPERGLYCEMIA: Status: ACTIVE | Noted: 2024-02-25

## 2024-02-25 PROBLEM — K21.9 GERD (GASTROESOPHAGEAL REFLUX DISEASE): Status: ACTIVE | Noted: 2024-02-25

## 2024-02-25 PROBLEM — Q24.8 INTERATRIAL CARDIAC SHUNT: Status: ACTIVE | Noted: 2024-02-25

## 2024-02-25 LAB
ALBUMIN SERPL BCP-MCNC: 3.5 G/DL (ref 3.2–4.9)
BASOPHILS # BLD AUTO: 0.2 % (ref 0–1.8)
BASOPHILS # BLD: 0.02 K/UL (ref 0–0.12)
BUN SERPL-MCNC: 21 MG/DL (ref 8–22)
CALCIUM ALBUM COR SERPL-MCNC: 9.3 MG/DL (ref 8.5–10.5)
CALCIUM SERPL-MCNC: 8.9 MG/DL (ref 8.5–10.5)
CHLORIDE SERPL-SCNC: 108 MMOL/L (ref 96–112)
CHOLEST SERPL-MCNC: 223 MG/DL (ref 100–199)
CO2 SERPL-SCNC: 22 MMOL/L (ref 20–33)
CREAT SERPL-MCNC: 0.57 MG/DL (ref 0.5–1.4)
EOSINOPHIL # BLD AUTO: 0.01 K/UL (ref 0–0.51)
EOSINOPHIL NFR BLD: 0.1 % (ref 0–6.9)
ERYTHROCYTE [DISTWIDTH] IN BLOOD BY AUTOMATED COUNT: 43.5 FL (ref 35.9–50)
GFR SERPLBLD CREATININE-BSD FMLA CKD-EPI: 109 ML/MIN/1.73 M 2
GLUCOSE BLD STRIP.AUTO-MCNC: 101 MG/DL (ref 65–99)
GLUCOSE BLD STRIP.AUTO-MCNC: 93 MG/DL (ref 65–99)
GLUCOSE SERPL-MCNC: 98 MG/DL (ref 65–99)
HCT VFR BLD AUTO: 36.8 % (ref 37–47)
HDLC SERPL-MCNC: 100 MG/DL
HGB BLD-MCNC: 13.2 G/DL (ref 12–16)
IMM GRANULOCYTES # BLD AUTO: 0.08 K/UL (ref 0–0.11)
IMM GRANULOCYTES NFR BLD AUTO: 0.7 % (ref 0–0.9)
LDLC SERPL CALC-MCNC: 107 MG/DL
LYMPHOCYTES # BLD AUTO: 2.08 K/UL (ref 1–4.8)
LYMPHOCYTES NFR BLD: 19.4 % (ref 22–41)
MAGNESIUM SERPL-MCNC: 2.1 MG/DL (ref 1.5–2.5)
MCH RBC QN AUTO: 33.6 PG (ref 27–33)
MCHC RBC AUTO-ENTMCNC: 35.9 G/DL (ref 32.2–35.5)
MCV RBC AUTO: 93.6 FL (ref 81.4–97.8)
MONOCYTES # BLD AUTO: 0.85 K/UL (ref 0–0.85)
MONOCYTES NFR BLD AUTO: 7.9 % (ref 0–13.4)
NEUTROPHILS # BLD AUTO: 7.69 K/UL (ref 1.82–7.42)
NEUTROPHILS NFR BLD: 71.7 % (ref 44–72)
NRBC # BLD AUTO: 0 K/UL
NRBC BLD-RTO: 0 /100 WBC (ref 0–0.2)
NT-PROBNP SERPL IA-MCNC: 109 PG/ML (ref 0–125)
PHOSPHATE SERPL-MCNC: 2.5 MG/DL (ref 2.5–4.5)
PLATELET # BLD AUTO: 230 K/UL (ref 164–446)
PMV BLD AUTO: 9.7 FL (ref 9–12.9)
POTASSIUM SERPL-SCNC: 3.6 MMOL/L (ref 3.6–5.5)
RBC # BLD AUTO: 3.93 M/UL (ref 4.2–5.4)
SODIUM SERPL-SCNC: 141 MMOL/L (ref 135–145)
TRIGL SERPL-MCNC: 78 MG/DL (ref 0–149)
TROPONIN T SERPL-MCNC: 7 NG/L (ref 6–19)
VIT B6 SERPL-MCNC: 129.7 NMOL/L (ref 20–125)
WBC # BLD AUTO: 10.7 K/UL (ref 4.8–10.8)

## 2024-02-25 PROCEDURE — 93970 EXTREMITY STUDY: CPT

## 2024-02-25 PROCEDURE — 82962 GLUCOSE BLOOD TEST: CPT | Mod: 91

## 2024-02-25 PROCEDURE — G0378 HOSPITAL OBSERVATION PER HR: HCPCS

## 2024-02-25 PROCEDURE — 84484 ASSAY OF TROPONIN QUANT: CPT

## 2024-02-25 PROCEDURE — 99239 HOSP IP/OBS DSCHRG MGMT >30: CPT | Performed by: HOSPITALIST

## 2024-02-25 PROCEDURE — 80069 RENAL FUNCTION PANEL: CPT

## 2024-02-25 PROCEDURE — 36415 COLL VENOUS BLD VENIPUNCTURE: CPT

## 2024-02-25 PROCEDURE — 85025 COMPLETE CBC W/AUTO DIFF WBC: CPT

## 2024-02-25 PROCEDURE — A9502 TC99M TETROFOSMIN: HCPCS

## 2024-02-25 PROCEDURE — 700111 HCHG RX REV CODE 636 W/ 250 OVERRIDE (IP): Performed by: STUDENT IN AN ORGANIZED HEALTH CARE EDUCATION/TRAINING PROGRAM

## 2024-02-25 PROCEDURE — A9270 NON-COVERED ITEM OR SERVICE: HCPCS | Performed by: STUDENT IN AN ORGANIZED HEALTH CARE EDUCATION/TRAINING PROGRAM

## 2024-02-25 PROCEDURE — 83735 ASSAY OF MAGNESIUM: CPT

## 2024-02-25 PROCEDURE — 700102 HCHG RX REV CODE 250 W/ 637 OVERRIDE(OP): Performed by: STUDENT IN AN ORGANIZED HEALTH CARE EDUCATION/TRAINING PROGRAM

## 2024-02-25 RX ORDER — METHOCARBAMOL 500 MG/1
500-1000 TABLET, FILM COATED ORAL 3 TIMES DAILY PRN
Status: DISCONTINUED | OUTPATIENT
Start: 2024-02-25 | End: 2024-02-25 | Stop reason: HOSPADM

## 2024-02-25 RX ORDER — PREDNISONE 20 MG/1
TABLET ORAL
Qty: 11 TABLET | Refills: 0 | Status: SHIPPED | OUTPATIENT
Start: 2024-02-26 | End: 2024-03-03

## 2024-02-25 RX ORDER — ASPIRIN 81 MG/1
81 TABLET ORAL DAILY
Status: DISCONTINUED | OUTPATIENT
Start: 2024-02-25 | End: 2024-02-25 | Stop reason: HOSPADM

## 2024-02-25 RX ORDER — ASPIRIN 81 MG/1
81 TABLET ORAL DAILY
COMMUNITY

## 2024-02-25 RX ORDER — ATORVASTATIN CALCIUM 40 MG/1
40 TABLET, FILM COATED ORAL EVERY EVENING
Qty: 30 TABLET | Refills: 2 | Status: SHIPPED | OUTPATIENT
Start: 2024-02-25

## 2024-02-25 RX ORDER — VITAMIN B COMPLEX
2000 TABLET ORAL DAILY
Status: DISCONTINUED | OUTPATIENT
Start: 2024-02-25 | End: 2024-02-25 | Stop reason: HOSPADM

## 2024-02-25 RX ORDER — OMEPRAZOLE 20 MG/1
20 CAPSULE, DELAYED RELEASE ORAL DAILY
Status: DISCONTINUED | OUTPATIENT
Start: 2024-02-25 | End: 2024-02-25 | Stop reason: HOSPADM

## 2024-02-25 RX ORDER — PREDNISONE 20 MG/1
60 TABLET ORAL DAILY
Status: DISCONTINUED | OUTPATIENT
Start: 2024-02-25 | End: 2024-02-25 | Stop reason: HOSPADM

## 2024-02-25 RX ORDER — LOSARTAN POTASSIUM 50 MG/1
25 TABLET ORAL DAILY
Status: DISCONTINUED | OUTPATIENT
Start: 2024-02-25 | End: 2024-02-25 | Stop reason: HOSPADM

## 2024-02-25 RX ORDER — ACETAMINOPHEN 500 MG
500 TABLET ORAL 2 TIMES DAILY PRN
COMMUNITY

## 2024-02-25 RX ORDER — IPRATROPIUM BROMIDE AND ALBUTEROL SULFATE 2.5; .5 MG/3ML; MG/3ML
3 SOLUTION RESPIRATORY (INHALATION)
Status: DISCONTINUED | OUTPATIENT
Start: 2024-02-25 | End: 2024-02-25 | Stop reason: HOSPADM

## 2024-02-25 RX ORDER — LOSARTAN POTASSIUM 50 MG/1
50 TABLET ORAL DAILY
Qty: 30 TABLET | Refills: 2 | Status: SHIPPED | OUTPATIENT
Start: 2024-02-25

## 2024-02-25 RX ORDER — ALENDRONATE SODIUM 70 MG/1
70 TABLET ORAL
Status: DISCONTINUED | OUTPATIENT
Start: 2024-02-28 | End: 2024-02-25 | Stop reason: HOSPADM

## 2024-02-25 RX ORDER — CALCIUM CARBONATE 500 MG/1
500 TABLET, CHEWABLE ORAL PRN
Status: DISCONTINUED | OUTPATIENT
Start: 2024-02-25 | End: 2024-02-25 | Stop reason: HOSPADM

## 2024-02-25 RX ORDER — DEXTROSE MONOHYDRATE 25 G/50ML
25 INJECTION, SOLUTION INTRAVENOUS
Status: DISCONTINUED | OUTPATIENT
Start: 2024-02-25 | End: 2024-02-25

## 2024-02-25 RX ADMIN — OMEPRAZOLE 20 MG: 20 CAPSULE, DELAYED RELEASE ORAL at 05:13

## 2024-02-25 RX ADMIN — Medication 2000 UNITS: at 05:13

## 2024-02-25 RX ADMIN — PREDNISONE 60 MG: 20 TABLET ORAL at 05:13

## 2024-02-25 RX ADMIN — LOSARTAN POTASSIUM 25 MG: 50 TABLET, FILM COATED ORAL at 05:13

## 2024-02-25 RX ADMIN — ASPIRIN 81 MG: 81 TABLET, COATED ORAL at 05:13

## 2024-02-25 ASSESSMENT — ENCOUNTER SYMPTOMS
HEADACHES: 0
SHORTNESS OF BREATH: 0
FEVER: 0
LOSS OF CONSCIOUSNESS: 0
CHILLS: 0
NAUSEA: 0
BLURRED VISION: 0
PALPITATIONS: 0
DIZZINESS: 0
DEPRESSION: 0
MYALGIAS: 0
HEARTBURN: 1
ABDOMINAL PAIN: 0
VOMITING: 0
DOUBLE VISION: 0
COUGH: 0
BRUISES/BLEEDS EASILY: 0
FOCAL WEAKNESS: 1

## 2024-02-25 ASSESSMENT — PATIENT HEALTH QUESTIONNAIRE - PHQ9
1. LITTLE INTEREST OR PLEASURE IN DOING THINGS: NOT AT ALL
2. FEELING DOWN, DEPRESSED, IRRITABLE, OR HOPELESS: NOT AT ALL
SUM OF ALL RESPONSES TO PHQ9 QUESTIONS 1 AND 2: 0

## 2024-02-25 ASSESSMENT — LIFESTYLE VARIABLES
TOTAL SCORE: 0
CONSUMPTION TOTAL: NEGATIVE
ON A TYPICAL DAY WHEN YOU DRINK ALCOHOL HOW MANY DRINKS DO YOU HAVE: 0
ALCOHOL_USE: NO
HOW MANY TIMES IN THE PAST YEAR HAVE YOU HAD 5 OR MORE DRINKS IN A DAY: 0
EVER HAD A DRINK FIRST THING IN THE MORNING TO STEADY YOUR NERVES TO GET RID OF A HANGOVER: NO
DOES PATIENT WANT TO STOP DRINKING: NO
HAVE YOU EVER FELT YOU SHOULD CUT DOWN ON YOUR DRINKING: NO
TOTAL SCORE: 0
TOTAL SCORE: 0
HAVE PEOPLE ANNOYED YOU BY CRITICIZING YOUR DRINKING: NO
AVERAGE NUMBER OF DAYS PER WEEK YOU HAVE A DRINK CONTAINING ALCOHOL: 0
EVER FELT BAD OR GUILTY ABOUT YOUR DRINKING: NO
SUBSTANCE_ABUSE: 0

## 2024-02-25 ASSESSMENT — FIBROSIS 4 INDEX: FIB4 SCORE: 0.85

## 2024-02-25 NOTE — ED NOTES
Med rec complete per patient  Allergies reviewed.   Outpatient antibiotics in the last 30 days? No   Anticoagulants taken in the last 14 days? No   Patient takes Aspirin 81 mg daily, last dose 2/25/24 @ 1000    Pharmacy patient utilizes: [facility]

## 2024-02-25 NOTE — ED PROVIDER NOTES
"ER Provider Note    Scribed for Dr. Josh Fong M.D. by Hadley Freeman. 2/24/2024  10:28 PM    Primary Care Provider: Librado Villanueva D.O.    CHIEF COMPLAINT  Chief Complaint   Patient presents with    Lightheadedness     Pt sneezed tonight at 2030 & then had sudden onset of pulsating sensation, dizziness & lightheadedness. Pt was seen 2 days ago & had an MRI + CT, showed cerebral aneurysm.        EXTERNAL RECORDS REVIEWED  Patient was admitted 2 days ago for TIA, she had a normal MRI, small aneurism, might be due to bell's palsy  HPI/ROS  LIMITATION TO HISTORY   Select: : None    OUTSIDE HISTORIAN(S):   was present at bedside.    Yadira Suarez is a 53 y.o. female who presents to the ED for lightheaded onset 8:30 PM. Patient reports she was in the bathroom in where she sneezed and felt like \"passing out\" right after the sneeze. She notes a measured blood pressure of 139/55 after this incident. She reports feeling dizzy, nausea, and light headiness at the moment. Denies chest pain but feels winded at the moment. Patient mentions she hasn't been able to work out due to an a back accident that happened recently She mentions to feel prickly and numbed on her legs at the moment due to her back pain.     PAST MEDICAL HISTORY  Past Medical History:   Diagnosis Date    Anesthesia     naausea    Hypertension     Infectious disease     MRSA/VRE - works in hospital    Other specified symptom associated with female genital organs     hysterectomy    Pain     Parathyroid disorder (HCC)     Stroke (HCC)        SURGICAL HISTORY  Past Surgical History:   Procedure Laterality Date    PARATHYROIDECTOMY Right 05/02/2019    ABDOMINOPLASTY  12/11/2013    Performed by Sherrie Knott M.D. at SURGERY [facility]    FAITH BY LAPAROSCOPY  9/27/2012    Performed by Jana Henriquez M.D. at SURGERY [facility]    PELVISCOPY  9/16/2010    Performed by LALY FALLON at SURGERY [facility]    ABDOMINAL " HYSTERECTOMY TOTAL  2006    fibroid removal        FAMILY HISTORY  Family History   Problem Relation Age of Onset    Diabetes Mother         diet controlled    Hypertension Father     Heart Disease Father         MI age 61    Cancer Father         prostate ca    Cancer Sister         ovarian lesion    Heart Disease Brother         blood clot heart       SOCIAL HISTORY   reports that she has never smoked. She has never used smokeless tobacco. She reports current alcohol use. She reports that she does not use drugs.    CURRENT MEDICATIONS  Current Discharge Medication List        CONTINUE these medications which have NOT CHANGED    Details   acetaminophen (TYLENOL) 500 MG Tab Take 500 mg by mouth 2 times a day as needed.      aspirin 81 MG EC tablet Take 81 mg by mouth every day.      OnabotulinumtoxinA (BOTOX INJ) Inject 1 Dose as directed every 6 months. Eye muscle spasms      predniSONE (DELTASONE) 20 MG Tab Take 3 Tablets by mouth every day for 7 days.  Qty: 30 Tablet, Refills: 0    Associated Diagnoses: Bell's palsy      losartan (COZAAR) 25 MG Tab Take 1 Tablet by mouth every day. FOR 90 DAYS  Qty: 90 Tablet, Refills: 1      Magnesium Glycinate 100 MG Cap Take 200 mg by mouth every day at 6 PM.      Cholecalciferol (VITAMIN D) 2000 UNIT Tab Take 2,000 Units by mouth every day.      omeprazole (PRILOSEC) 20 MG delayed-release capsule Take 20 mg by mouth every day.      vitamin e (VITAMIN E) 400 UNIT Cap Take 400 Units by mouth every day.      Multiple Vitamin (MULTIVITAMIN PO) Take 1 Tab by mouth every day.      calcium carbonate (TUMS EXTRA STRENGTH 750) 750 MG chewable tablet Chew 1 Tablet as needed (Muscle cramps).      methocarbamol (ROBAXIN) 500 MG Tab Take 500-1,000 mg by mouth 3 times a day as needed (back pain).      alendronate (FOSAMAX) 70 MG Tab Take 1 Tablet by mouth every 7 days.  Qty: 12 Tablet, Refills: 3    Associated Diagnoses: Age-related osteoporosis without current pathological fracture              ALLERGIES  Pork allergy    PHYSICAL EXAM  BP (!) 168/96   Pulse 97   Temp 36.9 °C (98.4 °F) (Temporal)   Resp (!) 50   SpO2 96%   Constitutional: Slight face droop left side  HENT: No signs of trauma, Bilateral external ears normal, Nose normal.   Eyes: Pupils are equal and reactive, Conjunctiva normal, Non-icteric. No nystagmus  Neck: Normal range of motion, No tenderness, Supple,   Cardiovascular: Regular rate and rhythm, no murmurs.   Thorax & Lungs: Normal breath sounds, No respiratory distress, No wheezing, No chest tenderness.   Abdomen: Bowel sounds normal, Soft, No tenderness, No masses, No pulsatile masses. No peritoneal signs.  Skin: Warm, Dry, No erythema, No rash.   Back: No bony tenderness, No CVA tenderness.   Extremities: No edema, No tenderness, No cyanosis, no tenderness  Neurologic: Normal finger to nose test, 5/5 strength   Psychiatric: Affect normal     DIAGNOSTIC STUDIES & PROCEDURES    Labs:   Labs Reviewed   CBC WITH DIFFERENTIAL - Abnormal; Notable for the following components:       Result Value    Neutrophils-Polys 72.80 (*)     Lymphocytes 21.80 (*)     Neutrophils (Absolute) 7.53 (*)     All other components within normal limits   BASIC METABOLIC PANEL - Abnormal; Notable for the following components:    Glucose 209 (*)     All other components within normal limits   LIPID PROFILE - Abnormal; Notable for the following components:    Cholesterol,Tot 223 (*)      (*)     All other components within normal limits   CBC WITH DIFFERENTIAL - Abnormal; Notable for the following components:    RBC 3.93 (*)     Hematocrit 36.8 (*)     MCH 33.6 (*)     MCHC 35.9 (*)     Lymphocytes 19.40 (*)     Neutrophils (Absolute) 7.69 (*)     All other components within normal limits    Narrative:     Biotin intake of greater than 5 mg per day may interfere with  troponin levels, causing false low values.   POCT GLUCOSE DEVICE RESULTS - Abnormal; Notable for the following components:    POC  Glucose, Blood 101 (*)     All other components within normal limits   TROPONIN   ESTIMATED GFR   PROBRAIN NATRIURETIC PEPTIDE, NT   TROPONIN    Narrative:     Biotin intake of greater than 5 mg per day may interfere with  troponin levels, causing false low values.   MAGNESIUM    Narrative:     Biotin intake of greater than 5 mg per day may interfere with  troponin levels, causing false low values.   RENAL FUNCTION PANEL    Narrative:     Biotin intake of greater than 5 mg per day may interfere with  troponin levels, causing false low values.   ESTIMATED GFR    Narrative:     Biotin intake of greater than 5 mg per day may interfere with  troponin levels, causing false low values.         All labs reviewed by me.    EKG Interpretation:  Interpreted by me  Sinus rhythm 99  Mild sinus depression notice in V1 and V9     Radiology:   The attending Emergency Physician has independently interpreted the diagnostic imaging associated with this visit and is awaiting the final reading from the radiologist, which will be displayed below.    Preliminary interpretation is a follows: No acute bleed  Radiologist interpretation:      CT-HEAD W/O   Final Result      1.  No acute intracranial abnormality.   2.  Borderline cerebellar tonsillar ectopia again noted, better seen on MRI.         NM-CARDIAC STRESS TEST    (Results Pending)        COURSE & MEDICAL DECISION MAKING    ED Observation Status? No; Patient does not meet criteria for ED Observation.     INITIAL ASSESSMENT AND PLAN  Care Narrative:       10:28 PM - Patient seen and evaluated at bedside. Discussed plan of care, including imagining and labs to further evaluate. Patient agrees to plan of care. Patient will be treated with Asprin 162 mg, Normodynne/Trandate 10 mg. for her symptoms. Ordered EKG CT- head, CBC w/diff, Basic metabolic panel, Estimated GFR to evaluate.    11:34 PM- Patient was reevaluated at bedside. Discussed lab and radiology results with the patient and  informed them reassuring results. However, I am concern on patient EKG and will be speaking with Hospitalist for opinion on overnight watching.      11:48 PM- Spoke with Dr. Saldaña (Hospitalist) regarding the patient. They have accepted the patient for admission.        ADDITIONAL PROBLEM LIST AND DISPOSITION   Patient with episode of chest pain as well as sudden head pain.  This is also described as swimming in the patient's brain.  Ultimately a CT scan was done and is negative of the patient's brain for bleed.  The patient has an EKG with some ST changes.  Initial troponin is negative.  Heart score is moderate.  We will admit the patient.  Patient will need a stress test.               DISPOSITION AND DISCUSSIONS  I have discussed management of the patient with the following physicians and ALYSSA's:   Dr. Saldaña (Hospitalist)    Discussion of management with other [facility] or appropriate source(s): None     Barriers to care at this time, including but not limited to:  None .     Decision tools and prescription drugs considered including, but not limited to:  None .  DISPOSITION:  Patient will be hospitalized by Dr. Saldaña (Hospitalist)  in guarded condition.     FINAL IMPRESSION   1. Chest pain, unspecified type         I, Hadley Freeman (Jaime), am scribing for, and in the presence of, Josh Fong M.D..    Electronically signed by: Hadley Freeman (Jaime), 2/24/2024    IJosh M.D. personally performed the services described in this documentation, as scribed by Hadley Freeman in my presence, and it is both accurate and complete.    The note accurately reflects work and decisions made by me.  Josh Fong M.D.  2/25/2024  4:40 AM

## 2024-02-25 NOTE — THERAPY
Physical Therapy Contact Note    Patient Name: Yadira Suarez  Age:  53 y.o., Sex:  female  Medical Record #: [id number]  Today's Date: 2/25/2024 02/25/24 1200   Interdisciplinary Plan of Care Collaboration   Collaboration Comments PT orders received and chart review performed. Patient up self and is here for cardiac issues. No indication at this time for acute care PT needs or cardiac rehab. Please re-consult if there is a change in patient status

## 2024-02-25 NOTE — PROGRESS NOTES
2 patient identifiers used to identify patient. Patient placed on telemetry monitor. Transported to CDU room 202 from ED via gurney with  and all belongings. A&Ox4, VSS. Slight left sided facial droop. Denies any pain. Admission profile completed. Whiteboard updated patient and  updated on POC and NPO status. Patient denies any needs at this time. Bed locked, in lowest position. Call light within reach.

## 2024-02-25 NOTE — ED TRIAGE NOTES
Yadira Suarez  53 y.o.  female  Chief Complaint   Patient presents with    Lightheadedness     Pt sneezed tonight at 2030 & then had sudden onset of pulsating sensation, dizziness & lightheadedness. Pt was seen 2 days ago & had an MRI + CT, showed cerebral aneurysm.      Pt to red 2 from triage. No neuro deficits noted in triage.

## 2024-02-25 NOTE — ASSESSMENT & PLAN NOTE
TTE 2/22/2024: Normal left ventricular systolic function.  Grade II diastolic dysfunction.  Moderately dilated left atrium.  Evidence of early (0-5 beats) right to left shunt suggestive of intracardiac shunt (ASD or PFO).    Noted on echo  Outpatient f/u

## 2024-02-25 NOTE — H&P
Hospital Medicine History & Physical Note    Date of Service  2/24/2024    Primary Care Physician  Librado Villanueva D.O.    Consultants  None    Code Status  Full Code    Chief Complaint  Chief Complaint   Patient presents with    Lightheadedness     Pt sneezed tonight at 2030 & then had sudden onset of pulsating sensation, dizziness & lightheadedness. Pt was seen 2 days ago & had an MRI + CT, showed cerebral aneurysm.        History of Presenting Illness  Yadira Suarez is a 53 y.o. female with recent diagnosis of left facial droop thought to be secondary to Bell's palsy on prednisone, cardiac shunt (PFO/ASD) by TTE, hypertension, GERD, who presented 2/24/2024 with evaluation for lightheadedness.  Patient reported to have lightheadedness, felt as she was about to pass out.  She does complain of mild left upper extremity pain, shoulder joint pain.  Her CT head in ER did not show any acute etiology.  She was recently admitted at [facility] for left facial droop, found to have 1mm right MCA with no acute CVA seen on MRI brain dated 2/21/2024.  Ultimately, admission requested by ERP for ACS rule out.  Admitted to medicine service.    I discussed the plan of care with patient, bedside RN, and pharmacy.    Review of Systems  Review of Systems   Constitutional:  Negative for chills and fever.   HENT:  Negative for hearing loss and tinnitus.    Eyes:  Negative for blurred vision and double vision.   Respiratory:  Negative for cough and shortness of breath.    Cardiovascular:  Positive for chest pain (LUE). Negative for palpitations.   Gastrointestinal:  Positive for heartburn. Negative for abdominal pain, nausea and vomiting.   Genitourinary:  Negative for dysuria and urgency.   Musculoskeletal:  Negative for joint pain and myalgias.   Skin:  Negative for itching and rash.   Neurological:  Positive for focal weakness (left facial droop). Negative for dizziness, loss of consciousness and headaches.    Endo/Heme/Allergies:  Negative for environmental allergies. Does not bruise/bleed easily.   Psychiatric/Behavioral:  Negative for depression and substance abuse.    All other systems reviewed and are negative.      Past Medical History   has a past medical history of Anesthesia, Hypertension, Infectious disease, Other specified symptom associated with female genital organs, Pain, Parathyroid disorder (HCC), and Stroke (HCC).    Surgical History   has a past surgical history that includes abdominal hysterectomy total (2006); pelviscopy (9/16/2010); dee by laparoscopy (9/27/2012); abdominoplasty (12/11/2013); and parathyroidectomy (Right, 05/02/2019).     Family History  family history includes Cancer in her father and sister; Diabetes in her mother; Heart Disease in her brother and father; Hypertension in her father.   Family history reviewed with patient. There is family history that is pertinent to the chief complaint.     Social History   reports that she has never smoked. She has never used smokeless tobacco. She reports current alcohol use. She reports that she does not use drugs.    Allergies  Allergies   Allergen Reactions    Pork Allergy Rash and Itching     itching       Medications  Prior to Admission Medications   Prescriptions Last Dose Informant Patient Reported? Taking?   Cholecalciferol (VITAMIN D) 2000 UNIT Tab   Yes No   Sig: Take 2,000 Units by mouth every day.   MAGNESIUM GLYCINATE PO   Yes No   Sig: every day at 6 PM.   Multiple Vitamin (MULTIVITAMIN PO)  Patient Yes No   Sig: Take 1 Tab by mouth every day.   alendronate (FOSAMAX) 70 MG Tab   No No   Sig: Take 1 Tablet by mouth every 7 days.   calcium carbonate (TUMS EXTRA STRENGTH 750) 750 MG chewable tablet   Yes No   Sig: Chew 1 Tablet as needed (leg cramps).   losartan (COZAAR) 25 MG Tab   No No   Sig: Take 1 Tablet by mouth every day. FOR 90 DAYS   methocarbamol (ROBAXIN) 500 MG Tab   Yes No   Sig: Take 1,000 mg by mouth 3 times a day as  needed (back pain).   omeprazole (PRILOSEC) 20 MG delayed-release capsule   Yes No   Sig: Take 20 mg by mouth every day.   predniSONE (DELTASONE) 20 MG Tab   No No   Sig: Take 3 Tablets by mouth every day for 7 days.   vitamin e (VITAMIN E) 400 UNIT Cap   Yes No   Sig: vitamin E 400 unit capsule   Take 1 capsule every day by oral route.      Facility-Administered Medications: None       Physical Exam  Temp:  [36.7 °C (98 °F)-36.9 °C (98.4 °F)] 36.7 °C (98 °F)  Pulse:  [] 68  Resp:  [15-50] 17  BP: (147-209)/() 156/81  SpO2:  [92 %-98 %] 94 %  Blood Pressure: (!) 151/84   Temperature: 36.9 °C (98.4 °F)   Pulse: 80   Respiration: 16   Pulse Oximetry: 96 %       Physical Exam  Vitals and nursing note reviewed.   Constitutional:       General: She is not in acute distress.  HENT:      Head: Normocephalic and atraumatic.      Nose: Nose normal.      Mouth/Throat:      Mouth: Mucous membranes are moist.      Pharynx: Oropharynx is clear.   Eyes:      General: No scleral icterus.     Extraocular Movements: Extraocular movements intact.   Cardiovascular:      Rate and Rhythm: Normal rate and regular rhythm.      Pulses: Normal pulses.      Heart sounds:      No friction rub.   Pulmonary:      Effort: No respiratory distress.      Breath sounds: No wheezing or rales.   Chest:      Chest wall: No tenderness.   Abdominal:      General: There is no distension.      Tenderness: There is no abdominal tenderness. There is no guarding or rebound.   Musculoskeletal:         General: Normal range of motion.      Cervical back: Neck supple. No tenderness.      Right lower leg: No edema.      Left lower leg: No edema.   Skin:     General: Skin is warm and dry.      Capillary Refill: Capillary refill takes less than 2 seconds.   Neurological:      Mental Status: She is alert and oriented to person, place, and time.      Comments: Mild left facial droop   Psychiatric:         Mood and Affect: Mood normal.          Laboratory:  Recent Labs     02/24/24 2218 02/25/24  0054   WBC 10.3 10.7   RBC 4.54 3.93*   HEMOGLOBIN 15.0 13.2   HEMATOCRIT 43.0 36.8*   MCV 94.7 93.6   MCH 33.0 33.6*   MCHC 34.9 35.9*   RDW 44.4 43.5   PLATELETCT 268 230   MPV 9.6 9.7     Recent Labs     02/24/24 2218 02/25/24  0054   SODIUM 141 141   POTASSIUM 3.8 3.6   CHLORIDE 106 108   CO2 21 22   GLUCOSE 209* 98   BUN 22 21   CREATININE 0.68 0.57   CALCIUM 9.5 8.9     Recent Labs     02/24/24 2218 02/25/24  0054   GLUCOSE 209* 98         Recent Labs     02/24/24 2218   NTPROBNP 109     Recent Labs     02/24/24 2218   TRIGLYCERIDE 78      *     Recent Labs     02/24/24 2218 02/25/24  0054   TROPONINT 7 7       Imaging:  CT-HEAD W/O   Final Result      1.  No acute intracranial abnormality.   2.  Borderline cerebellar tonsillar ectopia again noted, better seen on MRI.         NM-CARDIAC STRESS TEST    (Results Pending)       X-Ray:  I have personally reviewed the images and compared with prior images.  EKG:  I have personally reviewed the images and compared with prior images.    Assessment/Plan:  Justification for Admission Status  I anticipate this patient is appropriate for observation status at this time.      * Chest pain- (present on admission)  Assessment & Plan  R/o ACS  Trend CE, EKG  ASA/statin  As needed nitro SL, morphine  Check echo, MPS      Interatrial cardiac shunt  Assessment & Plan  TTE 2/22/2024: Normal left ventricular systolic function.  Grade II diastolic dysfunction.  Moderately dilated left atrium.  Evidence of early (0-5 beats) right to left shunt suggestive of intracardiac shunt (ASD or PFO).    Noted on echo  Outpatient f/u    Bell's palsy  Assessment & Plan  Recent diagnosis  On prednisone -- continue    Hyperglycemia  Assessment & Plan  Due to prednisone use  -as needed ISS    Essential hypertension- (present on admission)  Assessment & Plan  losartan    GERD (gastroesophageal reflux  disease)  Assessment & Plan  Omeprazole    Dyslipidemia- (present on admission)  Assessment & Plan  Statin  Target LDL below 70 or as low as tolerated    S/P laparoscopic cholecystectomy- (present on admission)  Assessment & Plan  Per history        VTE prophylaxis: SCDs/TEDs

## 2024-02-25 NOTE — PROGRESS NOTES
"New orders for finger sticks and Insulin sliding scale. Text to Dr. Saldaña to confirm these orders as patient has no history of Diabetes and only had 1 elevate BG in ER while on steroids and not fasting. Response \"check POC and ISS if needed\".  "

## 2024-02-25 NOTE — CARE PLAN
The patient is Stable - Low risk of patient condition declining or worsening    Shift Goals  Clinical Goals: stress test  Patient Goals: rest, answers to what is going on  Family Goals: answers to what is going on    Progress made toward(s) clinical / shift goals:    Problem: Knowledge Deficit - Standard  Goal: Patient and family/care givers will demonstrate understanding of plan of care, disease process/condition, diagnostic tests and medications  Description: Target End Date:  1-3 days or as soon as patient condition allows    Document in Patient Education    1.  Patient and family/caregiver oriented to unit, equipment, visitation policy and means for communicating concern  2.  Complete/review Learning Assessment  3.  Assess knowledge level of disease process/condition, treatment plan, diagnostic tests and medications  4.  Explain disease process/condition, treatment plan, diagnostic tests and medications  Outcome: Progressing

## 2024-02-26 ENCOUNTER — TELEPHONE (OUTPATIENT)
Dept: CARDIOLOGY | Facility: MEDICAL CENTER | Age: 54
End: 2024-02-26
Payer: COMMERCIAL

## 2024-02-26 ENCOUNTER — OFFICE VISIT (OUTPATIENT)
Dept: CARDIOLOGY | Facility: MEDICAL CENTER | Age: 54
End: 2024-02-26
Attending: INTERNAL MEDICINE
Payer: COMMERCIAL

## 2024-02-26 ENCOUNTER — PHARMACY VISIT (OUTPATIENT)
Dept: PHARMACY | Facility: MEDICAL CENTER | Age: 54
End: 2024-02-26
Payer: COMMERCIAL

## 2024-02-26 VITALS
RESPIRATION RATE: 14 BRPM | DIASTOLIC BLOOD PRESSURE: 64 MMHG | WEIGHT: 175.4 LBS | HEART RATE: 65 BPM | OXYGEN SATURATION: 96 % | BODY MASS INDEX: 34.44 KG/M2 | HEIGHT: 60 IN | SYSTOLIC BLOOD PRESSURE: 114 MMHG

## 2024-02-26 DIAGNOSIS — R07.9 CHEST PAIN, UNSPECIFIED TYPE: ICD-10-CM

## 2024-02-26 DIAGNOSIS — I35.1 NONRHEUMATIC AORTIC VALVE INSUFFICIENCY: ICD-10-CM

## 2024-02-26 DIAGNOSIS — Q24.8 INTERATRIAL CARDIAC SHUNT: ICD-10-CM

## 2024-02-26 DIAGNOSIS — I10 ESSENTIAL HYPERTENSION: ICD-10-CM

## 2024-02-26 DIAGNOSIS — E78.5 DYSLIPIDEMIA: Chronic | ICD-10-CM

## 2024-02-26 PROCEDURE — 99204 OFFICE O/P NEW MOD 45 MIN: CPT | Performed by: INTERNAL MEDICINE

## 2024-02-26 PROCEDURE — 99213 OFFICE O/P EST LOW 20 MIN: CPT | Performed by: INTERNAL MEDICINE

## 2024-02-26 PROCEDURE — 93005 ELECTROCARDIOGRAM TRACING: CPT | Performed by: INTERNAL MEDICINE

## 2024-02-26 PROCEDURE — 3078F DIAST BP <80 MM HG: CPT | Performed by: INTERNAL MEDICINE

## 2024-02-26 PROCEDURE — 3074F SYST BP LT 130 MM HG: CPT | Performed by: INTERNAL MEDICINE

## 2024-02-26 ASSESSMENT — FIBROSIS 4 INDEX: FIB4 SCORE: 0.99

## 2024-02-26 NOTE — DISCHARGE SUMMARY
Discharge Summary    CHIEF COMPLAINT ON ADMISSION  Chief Complaint   Patient presents with    Lightheadedness     Pt sneezed tonight at 2030 & then had sudden onset of pulsating sensation, dizziness & lightheadedness. Pt was seen 2 days ago & had an MRI + CT, showed cerebral aneurysm.        Reason for Admission  Lightheadness     Admission Date  2/24/2024    CODE STATUS  Prior    HPI & HOSPITAL COURSE  Yadira Suarez is a 53 y.o. female with recent diagnosis of left facial droop thought to be secondary to Bell's palsy on prednisone, cardiac shunt (PFO/ASD) by TTE, hypertension, GERD, who presented 2/24/2024 with evaluation for lightheadedness.  Patient reported to have lightheadedness, felt as she was about to pass out.  She does complain of mild left upper extremity pain, shoulder joint pain.  Her CT head in ER did not show any acute etiology.  She was recently admitted at [facility] for left facial droop, found to have 1mm right MCA with no acute CVA seen on MRI brain dated 2/21/2024.  Ultimately, admission requested by ERP for ACS rule out.  Admitted to medicine service.   ===========================================    Prior to this hospitalization patient was seen for TIA IA leg symptoms.  She was diagnosed with a Bell's palsy.  Echocardiogram showed evidence of intracardiac shunt.  Check Dopplers of the both lower extremities to make sure there was no evidence of any DVT it was normal.      Patient okay to continue baby aspirin    I also recommended atorvastatin 40 mg nightly    For her Bell's palsy I added a taper of the steroids    The patient was monitored on telemetry.  There is no evidence of arrhythmia.  She had a Persantine stress test showed no evidence of ischemia.  Patient blood pressure was elevated and we increased patient's Cozaar from 25 mg to 50 mg p.o. daily.      Weight loss and exercise recommended    Minimize caffeine    Watch salt intake    Follow-up with PCP for further titration  of her blood pressure medication    Patient already has a appointment with cardiology to talk about her intracardiac shunt        Therefore, she is discharged in good and stable condition to home with close outpatient follow-up.    The patient recovered much more quickly than anticipated on admission.    Discharge Date  2/25/2024    FOLLOW UP ITEMS POST DISCHARGE      DISCHARGE DIAGNOSES  Principal Problem:    Chest pain (POA: Yes)  Active Problems:    Essential hypertension (POA: Yes)      Overview: BP is controlled with losartan 25mg     S/P laparoscopic cholecystectomy (POA: Yes)      Overview: 9/12 lap cholecystectomy    Dyslipidemia (Chronic) (POA: Yes)      Overview: 12/27/14 chol 197,trig 95,hdl 65,ldl 113      11/13/17 chol 194,trig 81,hdl 76,ldl 102    Hyperglycemia (POA: Yes)    Bell's palsy (POA: Yes)    GERD (gastroesophageal reflux disease) (POA: Yes)    Interatrial cardiac shunt (POA: Yes)  Resolved Problems:    * No resolved hospital problems. *      FOLLOW UP  Future Appointments   Date Time Provider Department Center   2/26/2024  1:40 PM Godfrey Caruso D.O. [facility] None   2/27/2024  2:00 PM Librado Villanueva D.O. [facility]   3/19/2024  8:30 AM STEVIE Doherty     No follow-up provider specified.    MEDICATIONS ON DISCHARGE     Medication List        START taking these medications        Instructions   atorvastatin 40 MG Tabs  Commonly known as: Lipitor   Take 1 Tablet by mouth every evening.  Dose: 40 mg            CHANGE how you take these medications        Instructions   losartan 50 MG Tabs  What changed:   medication strength  how much to take  additional instructions  Commonly known as: Cozaar   Take 1 Tablet by mouth every day.  Dose: 50 mg     * predniSONE 20 MG Tabs  What changed: Another medication with the same name was added. Make sure you understand how and when to take each.  Commonly known as: Deltasone   Take 3 Tablets by mouth every day for 7 days.  Dose:  60 mg     * predniSONE 20 MG Tabs  Start taking on: February 26, 2024  What changed: You were already taking a medication with the same name, and this prescription was added. Make sure you understand how and when to take each.  Commonly known as: Deltasone   Take 3 Tablets by mouth every day for 1 day, THEN 2.5 Tablets every day for 1 day, THEN 2 Tablets every day for 1 day, THEN 1.5 Tablets every day for 1 day, THEN 1 Tablet every day for 1 day, THEN 0.5 Tablets every day for 1 day.           * This list has 2 medication(s) that are the same as other medications prescribed for you. Read the directions carefully, and ask your doctor or other care provider to review them with you.                CONTINUE taking these medications        Instructions   acetaminophen 500 MG Tabs  Commonly known as: Tylenol   Take 500 mg by mouth 2 times a day as needed.  Dose: 500 mg     alendronate 70 MG Tabs  Commonly known as: Fosamax   Take 1 Tablet by mouth every 7 days.  Dose: 70 mg     aspirin 81 MG EC tablet   Take 81 mg by mouth every day.  Dose: 81 mg     BOTOX INJ   Inject 1 Dose as directed every 6 months. Eye muscle spasms  Dose: 1 Dose     Magnesium Glycinate 100 MG Caps   Take 200 mg by mouth every day at 6 PM.  Dose: 200 mg     methocarbamol 500 MG Tabs  Commonly known as: Robaxin   Take 500-1,000 mg by mouth 3 times a day as needed (back pain).  Dose: 500-1,000 mg     MULTIVITAMIN PO   Take 1 Tab by mouth every day.  Dose: 1 Tablet     omeprazole 20 MG delayed-release capsule  Commonly known as: PriLOSEC   Take 20 mg by mouth every day.  Dose: 20 mg     Tums Extra Strength 750 750 MG chewable tablet  Generic drug: calcium carbonate   Chew 1 Tablet as needed (Muscle cramps).  Dose: 1 Tablet     vitamin D 2000 UNIT Tabs   Take 2,000 Units by mouth every day.  Dose: 2,000 Units     vitamin e 400 UNIT Caps  Commonly known as: Vitamin E   Take 400 Units by mouth every day.  Dose: 400 Units              Allergies  Allergies    Allergen Reactions    Pork Allergy Rash and Itching     itching       DIET  No orders of the defined types were placed in this encounter.      ACTIVITY  As tolerated.  Weight bearing as tolerated    CONSULTATIONS      PROCEDURES  Natalie Saldaña M.D. on 2024 12:04 AM     Images     Show images for NM-CARDIAC STRESS TEST  NM-CARDIAC STRESS TEST  Order: [id number]  Status: Edited Result - FINAL       Visible to patient: Yes (not seen)       Next appt: Today at 01:40 PM in Cardiology (Godfrey Caruso D.O.)    0 Result Notes  Details    Reading Physician Reading Date Result Priority   No Reading Provider Prelim 2024    Satinder Holley M.D.  [phone] 2024    Anson Aquino M.D.  [phone] 2024      Narrative & Impression                           Myocardial Perfusion   Report   NUCLEAR IMAGING INTERPRETATION   No reversible defects that would indicate ischemia.    Normal left ventricular wall motion, with EF of 73%.   ECG INTERPRETATION   Negative stress ECG for ischemia.      ORA LORENZ      MRN:    [id number]         Gender:    F      Exam Date: 2024 11:36      Exam Location:      Inpatient      Ordering Phys:     NATALIE SALDAÑA      NucMed Tech:       Theodore Claire RT      Age:    53    :    1970        Ht (in):     60      Wt (lb):     177    BMI:    34.75       Radiologist      Risk Factors:             Family history of coronary disease, Hypertension,                              Dizziness      Indications:              Angina pectoris, unspecified      ICD Codes:                I20.9      Cardiac History:          Positive risk factors      Cardiac Meds:      Meds Past 24 hrs:      Pretest Chest Pain:       No symptoms      STRESS TEST      Exercise   Protoc   Gigi          Duration       07:11    METS:   9.2   ol:                     (m:s):                                 Post-Injection Exercise:        An additional 1 minutes of exercise followed   the                                     intravenous injection                     Resting HR (bpm):      83      Peak HR (bpm):         168      Resting BP (mmHg):       158    /   99      Peak BP (mmHg):       174   /   97      MaxPHR:     168     Target HR (bpm):       143      % MaxPHR:     100      Double Product:       [id number]      BP Response:      Stress Termination:       Completion of Protocol,Max predicted HR achieved      Stress Symptoms:   Tolerated exercise well      ECG      Resting ECG:     Sinus rhythm.      Stress ECG:      No ischemic changes with exercise.      IMAGE PROTOCOL      Rest/Stress 1                        Day              RadiopharmaceuticalDose (mCi)   Imaging  Date      Imaging  Time           Inj to Img Time (min)   Rest:   Tc-99m             7.3          25-Feb-2024        12:02           Tetrofosmin   Stress: Tc-99m             25.3         25-Feb-2024        12:42           Tetrofosmin      Rest:   Administration Site:       Right antecubital                               fossa   Administered by:      Theodore Maddenon RT      Stress:   Administration Site:       Right antecubital                               fossa   Administered by:      Theodore Rangel RT      % Percent HR Achieved:   SPECT RESULTS      Technical Quality:       Good      Raw Data Analysis:   Summed Stress Score:    1   Summed Rest Score:    3   Summed Difference Score:        0   PERFUSION:   No reversible defects that would indicate ischemia.       FUNCTIONAL RESULTS (calculated via Gated SPECT)      Stress Image LV EF:        73     %      Upper Normal Limit      Stress EDV:      60     ml   EDVI:    34      ml/m2      Stress ESV:      16     ml   ESVI:    9       ml/m2      TID:    0.75   TID - 1.19      TID (ed) - 1.23   LV Function:   Normal left ventricular wall motion, with EF of 73%.                            Satinder Holley MD   Edited by: Anson Aquino M.D.   (Electronically Signed)   Final Date:      25 February 2024                      13:35   Amended:         25 February 2024 15:39           Specimen Collected: 02/25/24 11:36 AM Last Resulted: 02/25/24  3:40 PM       Order Details        View Encounter        Lab and Collection Details        Routing        Result History - Result Edited     View All Conversations on this Encounter           Linked Documents    View SynapseCV Report      Scans on Order [id number]    Scan on 2/25/2024  1:20 PM by Michael Rangel: Nuclear Worksheet         Result Care Coordination      Patient Communication     Add Comments   Not seen Back to Top           NM-CARDIAC STRESS TEST [[id number]]    Electronically signed by: Israel Saldaña M.D. on 02/24/24 234       LABORATORY  Lab Results   Component Value Date    SODIUM 141 02/25/2024    POTASSIUM 3.6 02/25/2024    CHLORIDE 108 02/25/2024    CO2 22 02/25/2024    GLUCOSE 98 02/25/2024    BUN 21 02/25/2024    CREATININE 0.57 02/25/2024    CREATININE 0.91 02/04/2011    GLOMRATE >59 02/04/2011        Lab Results   Component Value Date    WBC 10.7 02/25/2024    WBC 5.1 02/04/2011    HEMOGLOBIN 13.2 02/25/2024    HEMATOCRIT 36.8 (L) 02/25/2024    PLATELETCT 230 02/25/2024        Total time of the discharge process exceeds 38  minutes.

## 2024-02-26 NOTE — PATIENT INSTRUCTIONS
Follow up in 3 months  Check a lipid panel in 2 months  Continue current medications  Call with questions.

## 2024-02-26 NOTE — ASSESSMENT & PLAN NOTE
Regarding her aortic valve insufficiency I would recommend repeat echocardiogram in 2 to 3 years to further assess progression.

## 2024-02-26 NOTE — ASSESSMENT & PLAN NOTE
Blood pressures currently well-controlled on her current regimen however she did have a recent increase in her losartan to as high as 50 mg daily.  Given her vertigo-like symptoms it would not be unreasonable to consider decreasing her overall losartan 25 mg daily

## 2024-02-26 NOTE — TELEPHONE ENCOUNTER
LVM FOR PT TO CB AND RESCHEDULE APPT 2/28/24 NEW PT VISIT WITH DR MORALES. PT CAN BE SCHEDULED WITH GEN CARDIO.

## 2024-02-26 NOTE — ASSESSMENT & PLAN NOTE
Clinically, she is doing fair and had strokelike symptoms but ultimately was diagnosed with Bell's palsy.  She is referred to neurology for further evaluation given there was noted 1 mm aneurysmal changes on CT of the head in the distribution of the right middle cerebral artery but were unsubstantiated on the MRI that was performed during her hospital stay.  At this time given the anterior atrial cardiac shunt seen on her most recent echocardiogram I have recommended that she continue with her ongoing aspirin 81 mg daily.  This is a very small shunt based on visual review.  I feel this is an incidental finding as a result of her recent diagnosis of Bell's palsy.

## 2024-02-26 NOTE — PROGRESS NOTES
[facility]    PatientName:Yadira Treviño BakerDate: 2024  :1970    53 y.o.PCP:Librado Villanueva D.O.  MRN:[id number]          Problems and Plans    Interatrial cardiac shunt  Clinically, she is doing fair and had strokelike symptoms but ultimately was diagnosed with Bell's palsy.  She is referred to neurology for further evaluation given there was noted 1 mm aneurysmal changes on CT of the head in the distribution of the right middle cerebral artery but were unsubstantiated on the MRI that was performed during her hospital stay.  At this time given the anterior atrial cardiac shunt seen on her most recent echocardiogram I have recommended that she continue with her ongoing aspirin 81 mg daily.  This is a very small shunt based on visual review.  I feel this is an incidental finding as a result of her recent diagnosis of Bell's palsy.    Nonrheumatic aortic valve insufficiency  Regarding her aortic valve insufficiency I would recommend repeat echocardiogram in 2 to 3 years to further assess progression.    Essential hypertension  Blood pressures currently well-controlled on her current regimen however she did have a recent increase in her losartan to as high as 50 mg daily.  Given her vertigo-like symptoms it would not be unreasonable to consider decreasing her overall losartan 25 mg daily    Return in about 3 months (around 2024).      Encounter    Reason for Visit / Chief Complaint: Facial droop/abnormal echocardiogram    HPI    53-year-old female with noted history of total hysterectomy, hemifacial spasm, hyperparathyroidism status post parathyroidectomy presents in consultation for evaluation of abnormal echocardiogram and recent hospitalization for left-sided facial droop.    Currently, she notes that her facial droop is significantly improving.  She was currently admitted 2 days ago to [facility] and diagnosed with Bell's palsy.  During  that time she underwent echocardiogram evaluation that demonstrated a positive bubble study suggestive of PFO/ASD.  She had a lower extremity duplex study that did not demonstrate any thrombus.  Her CT of the head demonstrated concern for a 1 mm aneurysm in the distribution of the right middle cerebral artery.  MRI did not demonstrate anatomical findings.  Since that time she has been taking aspirin therapy in addition to starting on a prednisone taper for her acute diagnosis of Bell's palsy.  She has also recently had her losartan therapy increased to 50 mg daily but notes some vertigo-like symptoms.  She was recently started on Lipitor therapy.  Her other clinical concern is that she has been experiencing worsening lower extremity weakness and has difficulty rising from a chair.  She has been on steroid therapy and not noticed a significant improvement recently      Past Medical History  Past Medical History:   Diagnosis Date    Anesthesia     naausea    Hypertension     Infectious disease     MRSA/VRE - works in hospital    Other specified symptom associated with female genital organs     hysterectomy    Pain     Parathyroid disorder (HCC)     Stroke (HCC)      Past Surgical History  Past Surgical History:   Procedure Laterality Date    PARATHYROIDECTOMY Right 05/02/2019    ABDOMINOPLASTY  12/11/2013    Performed by Sherrie Knott M.D. at SURGERY [facility] ORS    FAITH BY LAPAROSCOPY  9/27/2012    Performed by Jana Henriquez M.D. at SURGERY [facility] ORS    PELVISCOPY  9/16/2010    Performed by LALY FALLON at SURGERY [facility] ORS    ABDOMINAL HYSTERECTOMY TOTAL  2006    fibroid removal      Social History  Social History     Socioeconomic History    Marital status:      Spouse name: Federico    Number of children: Not on file    Years of education: Not on file    Highest education level: Bachelor's degree (e.g., BA, AB, BS)   Occupational History    Occupation:       Employer: [employer]    Tobacco Use    Smoking status: Never    Smokeless tobacco: Never   Vaping Use    Vaping Use: Never used   Substance and Sexual Activity    Alcohol use: Yes     Comment: glass of wine every six months    Drug use: No    Sexual activity: Yes     Partners: Male     Birth control/protection: Surgical   Other Topics Concern    Not on file   Social History Narrative    Not on file     Social Determinants of Health     Financial Resource Strain: Low Risk  (4/11/2023)    Overall Financial Resource Strain (CARDIA)     Difficulty of Paying Living Expenses: Not very hard   Food Insecurity: No Food Insecurity (4/11/2023)    Hunger Vital Sign     Worried About Running Out of Food in the Last Year: Never true     Ran Out of Food in the Last Year: Never true   Transportation Needs: No Transportation Needs (4/11/2023)    PRAPARE - Transportation     Lack of Transportation (Medical): No     Lack of Transportation (Non-Medical): No   Physical Activity: Insufficiently Active (4/11/2023)    Exercise Vital Sign     Days of Exercise per Week: 2 days     Minutes of Exercise per Session: 20 min   Stress: No Stress Concern Present (4/11/2023)    [place] [employer] - Occupational Stress Questionnaire     Feeling of Stress : Only a little   Social Connections: Moderately Integrated (4/11/2023)    Social Connection and Isolation Panel [NHANES]     Frequency of Communication with Friends and Family: More than three times a week     Frequency of Social Gatherings with Friends and Family: Once a week     Attends Faith Services: 1 to 4 times per year     Active Member of Clubs or Organizations: No     Attends Club or Organization Meetings: Never     Marital Status:    Intimate Partner Violence: Not on file   Housing Stability: Low Risk  (4/11/2023)    Housing Stability Vital Sign     Unable to Pay for Housing in the Last Year: No     Number of Places Lived in the Last Year: 1     Unstable Housing in the Last  Year: No     Past Family History  Family History   Problem Relation Age of Onset    Diabetes Mother         diet controlled    Hypertension Father     Heart Disease Father         MI age 61    Cancer Father         prostate ca    Cancer Sister         ovarian lesion    Heart Disease Brother         blood clot heart     Medication(s)    Current Outpatient Medications:     acetaminophen, 500 mg, Oral, BID PRN, PRN    aspirin, 81 mg, Oral, DAILY, Taking    OnabotulinumtoxinA (BOTOX INJ), 1 Dose, Injection, Q6 MO, Taking    losartan, 50 mg, Oral, DAILY, Taking    predniSONE, Take 3 Tablets by mouth every day for 1 day, THEN 2.5 Tablets every day for 1 day, THEN 2 Tablets every day for 1 day, THEN 1.5 Tablets every day for 1 day, THEN 1 Tablet every day for 1 day, THEN 0.5 Tablets every day for 1 day., Taking    atorvastatin, 40 mg, Oral, Q EVENING, Taking    Tums Extra Strength 750, 1 Tablet, Oral, PRN, PRN    methocarbamol, 500-1,000 mg, Oral, TID PRN, PRN    alendronate, 70 mg, Oral, Q7 DAYS, Taking    Magnesium Glycinate, 200 mg, Oral, DAILY AT 1800, Taking    vitamin D, 2,000 Units, Oral, DAILY, Taking Differently    omeprazole, 20 mg, Oral, DAILY, Taking    vitamin e, 400 Units, Oral, DAILY, Taking    Multiple Vitamin (MULTIVITAMIN PO), 1 Tablet, Oral, DAILY, Taking  Allergies  Pork allergy    Review of Systems    A comprehensive 10 system review was conducted and is negative except as noted above in the HPI or here.      Vital Signs  /64 (BP Location: Left arm, Patient Position: Sitting, BP Cuff Size: Adult)   Pulse 65   Resp 14   Ht 1.524 m (5')   Wt 79.6 kg (175 lb 6.4 oz)   SpO2 96%   BMI 34.26 kg/m²     Physical Exam  Vitals and nursing note reviewed.   Constitutional:       Appearance: Normal appearance.   HENT:      Head: Normocephalic and atraumatic.      Nose: Nose normal.      Mouth/Throat:      Mouth: Mucous membranes are moist.      Pharynx: Oropharynx is clear.   Eyes:      Pupils: Pupils  are equal, round, and reactive to light.   Cardiovascular:      Rate and Rhythm: Normal rate and regular rhythm.      Heart sounds: No murmur heard.     No friction rub. No gallop.   Pulmonary:      Effort: Pulmonary effort is normal.      Breath sounds: Normal breath sounds.   Abdominal:      General: Bowel sounds are normal.      Palpations: Abdomen is soft.   Musculoskeletal:         General: Normal range of motion.      Cervical back: Normal range of motion and neck supple.   Skin:     General: Skin is warm and dry.   Neurological:      General: No focal deficit present.      Mental Status: She is alert and oriented to person, place, and time.      Comments: Facial asymmetry with decreased left-sided   Psychiatric:         Mood and Affect: Mood normal.         Behavior: Behavior normal.         Thought Content: Thought content normal.         Judgment: Judgment normal.         Lab Results   Component Value Date/Time    TSHULTRASEN 1.270 02/21/2024 1313        Lab Results   Component Value Date/Time    FREET4 1.41 02/21/2024 1310        Lab Results   Component Value Date/Time    HBA1C 5.2 02/11/2019 09:44 AM    HBA1C 5.5 11/07/2017 10:36 AM       Lab Results   Component Value Date/Time    CHOLSTRLTOT 223 (H) 02/24/2024 10:18 PM     (H) 02/24/2024 10:18 PM     02/24/2024 10:18 PM    TRIGLYCERIDE 78 02/24/2024 10:18 PM         Lab Results   Component Value Date/Time    SODIUM 141 02/25/2024 12:54 AM    POTASSIUM 3.6 02/25/2024 12:54 AM    CHLORIDE 108 02/25/2024 12:54 AM    CO2 22 02/25/2024 12:54 AM    GLUCOSE 98 02/25/2024 12:54 AM    BUN 21 02/25/2024 12:54 AM    CREATININE 0.57 02/25/2024 12:54 AM    CREATININE 0.91 02/04/2011 07:47 AM    BUNCREATRAT 25 (H) 11/07/2017 10:36 AM    BUNCREATRAT 26 (H) 02/04/2011 07:47 AM    GLOMRATE >59 02/04/2011 07:47 AM       Lab Results   Component Value Date/Time    ALKPHOSPHAT 94 02/21/2024 04:15 PM    ASTSGOT 22 02/21/2024 04:15 PM    ALTSGPT 25 02/21/2024  04:15 PM    TBILIRUBIN 0.5 02/21/2024 04:15 PM         Imaging  EKG demonstrates sinus rhythm with RSR prime pattern in V1 and nonspecific ST-T wave changes.  I reviewed interpreted EKG.  Findings were discussed with the patient    Echocardiogram  As noted above      Total patient time was estimated to be 45 minutes consisting of chart review, direct patient interaction, medication renewal, plan development and overall communication with the cardiovascular team.        Electronically signed by:   Godfrey Caruso DO, MPH  [facility]    Portions of this note were completed using voice recognition software (Dragon Naturally speaking software) . Occasional transcription errors may have escaped proof reading. I have made every reasonable attempt to correct obvious errors, but I expect that there are errors of grammar and possibly content that I did not discover before finalizing the note.

## 2024-02-27 ENCOUNTER — OFFICE VISIT (OUTPATIENT)
Dept: MEDICAL GROUP | Facility: LAB | Age: 54
End: 2024-02-27
Payer: COMMERCIAL

## 2024-02-27 VITALS
WEIGHT: 177.6 LBS | HEART RATE: 90 BPM | RESPIRATION RATE: 14 BRPM | TEMPERATURE: 97.8 F | SYSTOLIC BLOOD PRESSURE: 116 MMHG | OXYGEN SATURATION: 98 % | HEIGHT: 60 IN | DIASTOLIC BLOOD PRESSURE: 78 MMHG | BODY MASS INDEX: 34.87 KG/M2

## 2024-02-27 DIAGNOSIS — Z09 HOSPITAL DISCHARGE FOLLOW-UP: ICD-10-CM

## 2024-02-27 DIAGNOSIS — I67.1 BRAIN ANEURYSM: ICD-10-CM

## 2024-02-27 DIAGNOSIS — I10 ESSENTIAL HYPERTENSION: ICD-10-CM

## 2024-02-27 DIAGNOSIS — Q24.8 INTERATRIAL CARDIAC SHUNT: ICD-10-CM

## 2024-02-27 PROCEDURE — 3078F DIAST BP <80 MM HG: CPT | Performed by: STUDENT IN AN ORGANIZED HEALTH CARE EDUCATION/TRAINING PROGRAM

## 2024-02-27 PROCEDURE — 3074F SYST BP LT 130 MM HG: CPT | Performed by: STUDENT IN AN ORGANIZED HEALTH CARE EDUCATION/TRAINING PROGRAM

## 2024-02-27 PROCEDURE — 99214 OFFICE O/P EST MOD 30 MIN: CPT | Performed by: STUDENT IN AN ORGANIZED HEALTH CARE EDUCATION/TRAINING PROGRAM

## 2024-02-27 ASSESSMENT — ENCOUNTER SYMPTOMS
SHORTNESS OF BREATH: 0
CHILLS: 0
FEVER: 0

## 2024-02-27 ASSESSMENT — FIBROSIS 4 INDEX: FIB4 SCORE: 0.99

## 2024-02-27 NOTE — PROGRESS NOTES
Subjective:     CC: hospital follow up    HPI:   Yadira presents today for the following;    Problem   Hospital Discharge Follow-Up    Patient was admitted twice to the hospital. First for bells palsy with concern for tia. The second episode was for lightheadedness, dizziness, and nausea.     During her hospital stay she was found to have cardiac PFO which she is now following with cardiology.  She also had an brain CTA that was concerning for aneurysm. She initially had difficulty with closing her lips and was unable to drink fluid, but this issue resolved with in 1 day. She continues to have a facial droop and is currently taking steroids.      Brain Aneurysm    1 mm found on cta brain     Interatrial Cardiac Shunt    Following with cardiology and taking aspirin 81mg      Essential Hypertension    BP is controlled with losartan 25mg     2/27/24  -patient's losartan was increased recently to 50mg          Current Outpatient Medications Ordered in Epic   Medication Sig Dispense Refill    acetaminophen (TYLENOL) 500 MG Tab Take 500 mg by mouth 2 times a day as needed.      aspirin 81 MG EC tablet Take 81 mg by mouth every day.      OnabotulinumtoxinA (BOTOX INJ) Inject 1 Dose as directed every 6 months. Eye muscle spasms      losartan (COZAAR) 50 MG Tab Take 1 Tablet by mouth every day. 30 Tablet 2    predniSONE (DELTASONE) 20 MG Tab Take 3 Tablets by mouth every day for 1 day, THEN 2.5 Tablets every day for 1 day, THEN 2 Tablets every day for 1 day, THEN 1.5 Tablets every day for 1 day, THEN 1 Tablet every day for 1 day, THEN 0.5 Tablets every day for 1 day. 11 Tablet 0    atorvastatin (LIPITOR) 40 MG Tab Take 1 Tablet by mouth every evening. 30 Tablet 2    calcium carbonate (TUMS EXTRA STRENGTH 750) 750 MG chewable tablet Chew 1 Tablet as needed (Muscle cramps).      methocarbamol (ROBAXIN) 500 MG Tab Take 500-1,000 mg by mouth 3 times a day as needed (back pain).      alendronate (FOSAMAX) 70 MG Tab Take 1  Tablet by mouth every 7 days. 12 Tablet 3    Magnesium Glycinate 100 MG Cap Take 200 mg by mouth every day at 6 PM.      Cholecalciferol (VITAMIN D) 2000 UNIT Tab Take 2,000 Units by mouth every day. 4,000 units daily      omeprazole (PRILOSEC) 20 MG delayed-release capsule Take 20 mg by mouth every day.      vitamin e (VITAMIN E) 400 UNIT Cap Take 400 Units by mouth every day.      Multiple Vitamin (MULTIVITAMIN PO) Take 1 Tab by mouth every day.       No current [facility]-ordered facility-administered medications on file.           ROS:  Review of Systems   Constitutional:  Negative for chills and fever.   Respiratory:  Negative for shortness of breath.    Cardiovascular:  Negative for chest pain.       Objective:     Exam:  /78 (BP Location: Right arm, Patient Position: Sitting, BP Cuff Size: Adult)   Pulse 90   Temp 36.6 °C (97.8 °F) (Temporal)   Resp 14   Ht 1.524 m (5')   Wt 80.6 kg (177 lb 9.6 oz)   SpO2 98%   BMI 34.69 kg/m²  Body mass index is 34.69 kg/m².    Physical Exam  Constitutional:       General: She is not in acute distress.     Appearance: She is not ill-appearing.   Pulmonary:      Effort: Pulmonary effort is normal.   Neurological:      Mental Status: She is alert.   Psychiatric:         Mood and Affect: Mood normal.         Behavior: Behavior normal.         Thought Content: Thought content normal.         Judgment: Judgment normal.               Assessment & Plan:     Problem List Items Addressed This Visit       Brain aneurysm     New diagnosis   -patient has an appointment coming up with with neurology          Essential hypertension     Chronic-stable  -continue losartan 50mg          Hospital discharge follow-up    Interatrial cardiac shunt       3+ images, notes, labs reviewed     Please note that this dictation was created using voice recognition software. I have made every reasonable attempt to correct obvious errors, but I expect that there are errors of grammar and possibly  content that I did not discover before finalizing the note.

## 2024-02-28 ENCOUNTER — APPOINTMENT (OUTPATIENT)
Dept: CARDIOLOGY | Facility: MEDICAL CENTER | Age: 54
End: 2024-02-28
Payer: COMMERCIAL

## 2024-02-28 LAB — EKG IMPRESSION: NORMAL

## 2024-02-28 PROCEDURE — 93010 ELECTROCARDIOGRAM REPORT: CPT | Performed by: INTERNAL MEDICINE

## 2024-02-28 PROCEDURE — RXMED WILLOW AMBULATORY MEDICATION CHARGE: Performed by: INTERNAL MEDICINE

## 2024-02-29 ENCOUNTER — OFFICE VISIT (OUTPATIENT)
Dept: NEUROLOGY | Facility: MEDICAL CENTER | Age: 54
End: 2024-02-29
Attending: INTERNAL MEDICINE
Payer: COMMERCIAL

## 2024-02-29 VITALS
WEIGHT: 176.37 LBS | HEART RATE: 82 BPM | DIASTOLIC BLOOD PRESSURE: 76 MMHG | HEIGHT: 61 IN | OXYGEN SATURATION: 97 % | BODY MASS INDEX: 33.3 KG/M2 | SYSTOLIC BLOOD PRESSURE: 122 MMHG | TEMPERATURE: 96.9 F

## 2024-02-29 DIAGNOSIS — G51.32 HEMIFACIAL SPASM OF LEFT SIDE OF FACE: ICD-10-CM

## 2024-02-29 DIAGNOSIS — G51.0 BELL'S PALSY: ICD-10-CM

## 2024-02-29 PROCEDURE — 99212 OFFICE O/P EST SF 10 MIN: CPT | Performed by: INTERNAL MEDICINE

## 2024-02-29 PROCEDURE — 99205 OFFICE O/P NEW HI 60 MIN: CPT | Performed by: INTERNAL MEDICINE

## 2024-02-29 ASSESSMENT — FIBROSIS 4 INDEX: FIB4 SCORE: 0.99

## 2024-02-29 NOTE — PATIENT INSTRUCTIONS
Bell's palsy  - continue prednisone     Blepharospam  - botox as needed    Leg tremors, likely physiologic tremors  - continue strengthening and working with PT    Per cardiology:  - repeat echocardiogram in 2 to 3 years  - hypertension, losartan  - cardiac shunt

## 2024-02-29 NOTE — PROGRESS NOTES
[facility]  [address] [place] [address], [address]. [place] [address]  Phone: [phone] Patient Name: Yadira Suarez  : 1970  MRN: [id number]     ASSESSMENT / PLAN       Yadira Suarez is a 53 y.o. RHD female presenting for tremors and facial weakness    LEFT Blepharospam  Onset . MRI Brain 2024 unremarkable for brain stem or facial nerve findings. Consider repeating cranial nerve protocol with contrast in future.  - botox as needed    LEFT Bell's palsy  Onset 2024, improving  - continue prednisone taper as prescribed    Bilateral leg tremors, likely physiologic tremors  - continue strengthening and working with PT    Dizziness  - consider ENT/vestibular testing if not improving with continued PT. Normal saccades, does not appear vertiginous in nature    Possible right MCA 1mm aneurysm  - repeat CTA Head in 2025    Per cardiology:  - hypertension: losartan  - cardiac shunt: aspirin 81mg daily. repeat echocardiogram in 2 to 3 years. Dilated atrium may place her at higher risk of developing atrial fibrillation      - Return in about 3 months (around 2024).    Dillan Hernández DO  Neurology, Movement Disorders Specialist    BILLING DOCUMENTATION:   I spent 73 minutes reviewing the medical record, interviewing and examining the patient, discussing diagnosis and treatment, and coordinating care.          HISTORY OF PRESENT ILLNESS      Yadira Suarez is a 53 y.o. RHD female presenting for tremors and facial weakness  PMHx: total hysterectomy, ovarian cancer, hemifacial spasm, hyperparathyroidism status post parathyroidectomy 2019  Working as a RN    Initial HPI 24    Prior neurologist:   - Dr. Vivien Juares at [facility]  - Dr. Schumacher at [facility] [place]  - Dr. Patel at [facility]    Left facial weakness  Facial weakness improving 90%  2024: while at work, had slurred speech. Left facial droop thought to be secondary to Bell's palsy. Discharged with the following  "changes:    Aspirin 81mg  Atorvastatin 40mg  Losartan 25mg --> 50mg    Left blepharospasm  Left blepharospasm since 2018: left obicularis oculi and rhizorius regions spasm. No preceding injury or rash, no prior episodes of bell's palsy before 2024 episode.   Dr. Schumacher did botox x3 sessions. Left face, 30U total.    Vision change  2018: left monocular left visual field loss, thought to be lacunar stroke. MRI negative for acute stroke. Since then, visual field has improved close to baseline. Retinal testing normal. Optometry exam 9/2023 notes reviewed: VF testing normal. Normal fundus exam    Leg shakiness  Parathyroid surgery 2019. Progressively felt weaker since then.   2021: leg weakness and shakiness. Muscle aches worsening. Last time active with crossfit, hiking, running 50+ miles in 2022. Weight change 50+ in past year. Shakiness worse with prolonged standing. Some pain going sitting to stand, radiating from waist and down. 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 Prior neurologist thought to be orthostatic tremor but didn't start medication to treat (thought to be antiseizure medication)    Recent right leg injury when lifting at work, resulting in   Lateral paresthesias, lateral foot, and underside of foot    Dizziness  Valsalva sneezing, coughing,  causes \"motion\" side to side. Last happened on Friday 2/24/2024. Frequency approx 1/month. No headaches associated. Unclear duration of episode. Loud sounds doesn't trigger it. No vestibular testing in past.               1/17/2023     1:00 PM 4/29/2022     8:30 AM 11/6/2017     4:00 PM 12/26/2014     4:20 PM   PHQ-9 Screening   Little interest or pleasure in doing things    Not at all   Little interest or pleasure in doing things 0 - not at all 0 - not at all 0 - not at all    Feeling down, depressed, or hopeless    Not at all   Feeling down, depressed, or hopeless 0 - not at all 0 - not at all 0 - not at all    PHQ-2 Total Score    0   PHQ-2 " Total Score 0 0 0        Interpretation of PHQ-9 Total Score   Score Severity   1-4 No Depression   5-9 Mild Depression   10-14 Moderate Depression   15-19 Moderately Severe Depression   20-27 Severe Depression     Past Medical History:   Diagnosis Date    Anesthesia     naausea    Hypertension     Infectious disease     MRSA/VRE - works in hospital    Other specified symptom associated with female genital organs     hysterectomy    Pain     Parathyroid disorder (HCC)     Stroke (HCC)        Past Surgical History:   Procedure Laterality Date    PARATHYROIDECTOMY Right 05/02/2019    ABDOMINOPLASTY  12/11/2013    Performed by Sherrie Knott M.D. at SURGERY [facility] ORS    FAITH BY LAPAROSCOPY  9/27/2012    Performed by Jana Henriquez M.D. at SURGERY [facility] ORS    PELVISCOPY  9/16/2010    Performed by LALY FALLON at SURGERY [facility] ORS    ABDOMINAL HYSTERECTOMY TOTAL  2006    fibroid removal        Family History   Problem Relation Age of Onset    Diabetes Mother         diet controlled    Hypertension Father     Heart Disease Father         MI age 61    Cancer Father         prostate ca    Cancer Sister         ovarian lesion    Heart Disease Brother         blood clot heart       Social History     Socioeconomic History    Marital status:      Spouse name: Federico    Number of children: Not on file    Years of education: Not on file    Highest education level: Bachelor's degree (e.g., BA, AB, BS)   Occupational History    Occupation: ER      Employer: [employer]   Tobacco Use    Smoking status: Never    Smokeless tobacco: Never   Vaping Use    Vaping Use: Never used   Substance and Sexual Activity    Alcohol use: Yes     Comment: glass of wine every six months    Drug use: No    Sexual activity: Yes     Partners: Male     Birth control/protection: Surgical   Other Topics Concern    Not on file   Social History Narrative    Not on file     Social Determinants of Health     Financial Resource  Strain: Low Risk  (2/28/2024)    Overall Financial Resource Strain (CARDIA)     Difficulty of Paying Living Expenses: Not very hard   Food Insecurity: No Food Insecurity (2/28/2024)    Hunger Vital Sign     Worried About Running Out of Food in the Last Year: Never true     Ran Out of Food in the Last Year: Never true   Transportation Needs: No Transportation Needs (2/28/2024)    PRAPARE - Transportation     Lack of Transportation (Medical): No     Lack of Transportation (Non-Medical): No   Physical Activity: Insufficiently Active (2/28/2024)    Exercise Vital Sign     Days of Exercise per Week: 3 days     Minutes of Exercise per Session: 30 min   Stress: No Stress Concern Present (2/28/2024)    Citizen of [place] [employer] - Occupational Stress Questionnaire     Feeling of Stress : Only a little   Social Connections: Socially Integrated (2/28/2024)    Social Connection and Isolation Panel [NHANES]     Frequency of Communication with Friends and Family: More than three times a week     Frequency of Social Gatherings with Friends and Family: Twice a week     Attends Zoroastrian Services: 1 to 4 times per year     Active Member of Clubs or Organizations: Yes     Attends Club or Organization Meetings: 1 to 4 times per year     Marital Status:    Intimate Partner Violence: Not on file   Housing Stability: Low Risk  (2/28/2024)    Housing Stability Vital Sign     Unable to Pay for Housing in the Last Year: No     Number of Places Lived in the Last Year: 1     Unstable Housing in the Last Year: No       Current Outpatient Medications   Medication    acetaminophen (TYLENOL) 500 MG Tab    aspirin 81 MG EC tablet    OnabotulinumtoxinA (BOTOX INJ)    losartan (COZAAR) 50 MG Tab    predniSONE (DELTASONE) 20 MG Tab    atorvastatin (LIPITOR) 40 MG Tab    calcium carbonate (TUMS EXTRA STRENGTH 750) 750 MG chewable tablet    methocarbamol (ROBAXIN) 500 MG Tab    alendronate (FOSAMAX) 70 MG Tab    Magnesium  Glycinate 100 MG Cap    Cholecalciferol (VITAMIN D) 2000 UNIT Tab    omeprazole (PRILOSEC) 20 MG delayed-release capsule    vitamin e (VITAMIN E) 400 UNIT Cap    Multiple Vitamin (MULTIVITAMIN PO)     No current facility-administered medications for this visit.       Allergies   Allergen Reactions    Pork Allergy Rash and Itching     itching       DATA / RESULTS      25-Hydroxy   Vitamin D 25   Date Value Ref Range Status   02/21/2024 27 (L) 30 - 100 ng/mL Final     Comment:     Adult Ranges:   <20 ng/mL - Deficiency  20-29 ng/mL - Insufficiency   ng/mL - Sufficiency  Electrochemiluminescence binding assay performed using Roche kelton e  immunoassay analyzer.  The Elecsys Vitamin D total II assay is intended for  the quantitative determination of total 25 hydroxyvitamin D in human serum  and plasma. This assay is to be used as an aid in the assessment of vitamin  D sufficiency in adults.       Vitamin B12 -True Cobalamin   Date Value Ref Range Status   02/21/2024 569 211 - 911 pg/mL Final     TSH   Date Value Ref Range Status   02/21/2024 1.270 0.380 - 5.330 uIU/mL Final     Comment:     The 2011 American Thyroid Association (JUANA) guidelines  recommended that the interpretation of thyroid function in  pregnancy be based on trimester specific reference ranges.    1st Trimester  0.100-2.500 mIU/L  2nd Trimester  0.200-3.000 mIU/L  3rd Trimester  0.300-3.500 mIU/L    These established reference ranges have not been validated  at [facility] [employer].       Glycohemoglobin   Date Value Ref Range Status   02/11/2019 5.2 4.0 - 5.6 % Final     LDL   Date Value Ref Range Status   02/24/2024 107 (H) <100 mg/dL Final     MRI C-spine 3/2023:  1.  Mild degenerative disease in the cervical spine as described above.  2.  There are multiple enlarged bilateral jugulodigastric and submandibular lymph nodes. The right submandibular lymph node measures an approximately 1.3 cm. This lymph nodes likely represents reactive  "lymphadenopathy. However, the possibility of   pathological lymphadenopathy cannot be excluded. Please correlate with the clinical examination.     MRI T-spine 3/2023  MRI of the thoracic spine without and with contrast within normal limits except for small central disc protrusion at T6-7 without spinal or neural foraminal stenosis..     Echocardiogram 2/2024:  Normal left ventricular systolic function.  Grade II diastolic dysfunction.  Moderately dilated left atrium.  Evidence of early (0-5 beats) right to left shunt suggestive of   intracardiac shunt (ASD or PFO).    US Venous Leg 2/2024:   No acute thrombosis is identified.     MRI Brain 2/2024: reviewed and agree with report  1.  No acute abnormality.  2.  Mild chronic microvascular ischemic disease.  3.  Mild tonsillar ectopia.  4.  There has been no significant interval change.    CTA Neck 2/2024:  CT angiogram of the neck within normal limits.     CTA Head 2/2024:  1.  No evidence of intracranial vascular occlusion.   2.  Possible tiny 1 mm aneurysm emanating from the right middle cerebral artery distribution from an M2 branch.    OBJECTIVE      Vitals:    02/29/24 0724   BP: 122/76   BP Location: Right arm   Patient Position: Sitting   BP Cuff Size: Adult   Pulse: 82   Temp: 36.1 °C (96.9 °F)   TempSrc: Temporal   SpO2: 97%   Weight: 80 kg (176 lb 5.9 oz)   Height: 1.549 m (5' 1\")     Physical Exam     General: NAD, appears stated age.      Mental status: Speech clear and fluent without tremor. Fund of knowledge is good.      Cranial Nerves:  CN2: PERRL. Visual fields are full to finger confrontation.   CN3/4/6: EOMI. There is no nystagmus. Saccades are normal.   CN5: V1-V3 intact to light touch    CN7: Left obicularis oculi spasm, left lower facial weakness  CN8: Hearing grossly intact.   CN9/10/12: Soft palate and uvula rise symmetrically. Tongue midline.   CN11: Shoulder shrug intact bilaterally.     Strength  Right Left   Shoulder Abduction  5 5   Elbow " Flexion 5 5   Elbow Extension  5 5   Wrist Extension  5 5   Finger Extension  5 5   Hip Flexion  5 5   Knee Extension 5 5   Ankle Dorsiflexion  5 5     Abnormal movements:    UPDRS Right Left   Finger tapping 0 0   Hand Movement 0 0   Toe Tapping 0 0   Leg Agility 0 0   Rigidity 0 0   Rest Tremor 0 0   Postural Tremor <1cm <1cm   Kinetic Tremor <1cm <1cm      No tremors in legs when standing.   No dystonia, dyskinesias, tics, stereotypies, athetosis, akathisia, or chorea noted.      Sensory: normal to temperature. Decreased sharp on lateral RIGHT foot    Quantitative tuning fork Right Left   Hands 8 8   Feet 7 7       Cerebellar: No dysmetria with FTN or heel-to-shin.    Gait:   Posture - normal   Base - narrow   Stride length - normal   Arm swing - normal   Speed - normal  Shuffling/freezing - none  U-Turn - normal   Heel, toe, and tandem - normal   Romberg: negative. Difficulty with tandem stance.     PROCEDURE     N/A

## 2024-03-07 ENCOUNTER — OCCUPATIONAL MEDICINE (OUTPATIENT)
Dept: URGENT CARE | Facility: PHYSICIAN GROUP | Age: 54
End: 2024-03-07
Payer: COMMERCIAL

## 2024-03-07 VITALS
RESPIRATION RATE: 14 BRPM | SYSTOLIC BLOOD PRESSURE: 124 MMHG | HEART RATE: 85 BPM | TEMPERATURE: 97.8 F | WEIGHT: 177 LBS | OXYGEN SATURATION: 98 % | DIASTOLIC BLOOD PRESSURE: 72 MMHG | HEIGHT: 60 IN | BODY MASS INDEX: 34.75 KG/M2

## 2024-03-07 DIAGNOSIS — S33.6XXD SACROILIAC SPRAIN, SUBSEQUENT ENCOUNTER: ICD-10-CM

## 2024-03-07 PROCEDURE — 99213 OFFICE O/P EST LOW 20 MIN: CPT | Performed by: REGISTERED NURSE

## 2024-03-07 PROCEDURE — 3074F SYST BP LT 130 MM HG: CPT | Performed by: REGISTERED NURSE

## 2024-03-07 PROCEDURE — 3078F DIAST BP <80 MM HG: CPT | Performed by: REGISTERED NURSE

## 2024-03-07 ASSESSMENT — FIBROSIS 4 INDEX: FIB4 SCORE: 0.99

## 2024-03-07 NOTE — LETTER
[address] [place] [address]  Phone:  [phone] - Fax:  [phone]   [facility] Progress Report and Disability Certification  Date of Service: 3/7/2024   No Show:  No  Date / Time of Next Visit:     Claim Information   Patient Name: Yadira Suarez  Claim Number:     Employer: [employer]  Date of Injury:      Insurer / TPA: [employer]  ID / SSN:     Occupation:   Diagnosis: The encounter diagnosis was Sacroiliac sprain, subsequent encounter.    Medical Information   Related to Industrial Injury?      Subjective Complaints:  Visit # 4  Percent improved: 80%  Treatment: Has been doing physical therapy, started 7 days ago and reports improvement. Also doing gentle ROM at night. Tylenol as needed  Restrictions: Currently on full duty w/o issues.    No new injuries. No new numbness or tingling. No weakness.      Objective Findings: General: Well  Neuro: Neurovascular intact distally x 4  Skin: No bruising or rash  MSK: negative SLE bilaterally. Normal gait. No vertebral or paravertebral tenderness     Pre-Existing Condition(s):     Assessment:   Condition Improved    Status: Discharged /  MMI  Permanent Disability:     Plan:      Diagnostics:      Comments:       Disability Information   Status: Released to Full Duty    From:     Through:   Restrictions are:     Physical Restrictions   Sitting:    Standing:    Stooping:    Bending:      Squatting:    Walking:    Climbing:    Pushing:      Pulling:    Other:    Reaching Above Shoulder (L):   Reaching Above Shoulder (R):       Reaching Below Shoulder (L):    Reaching Below Shoulder (R):      Not to exceed Weight Limits   Carrying(hrs):   Weight Limit(lb):   Lifting(hrs):   Weight  Limit(lb):     Comments: Recommend continuing with physical therapy. Has reached MMI with us. Okay to use tylenol as needed. Home ROM activities. Heat.     Repetitive Actions   Hands: i.e. Fine Manipulations from  Grasping:     Feet: i.e. Operating Foot Controls:     Driving / Operate Machinery:     Health Care Provider’s Original or Electronic Signature  JENY Daugherty Health Care Provider’s Original or Electronic Signature    Lj Maldonado DO MPH     Clinic Name / Location: [address] [place] [address] Clinic Phone Number: Dept: [phone]   Appointment Time: 4:30 Pm Visit Start Time: 4:27 PM   Check-In Time:  4:17 Pm Visit Discharge Time: 4:46 PM   Original-Treating Physician or Chiropractor    Page 2-Insurer/TPA    Page 3-Employer    Page 4-Employee

## 2024-03-08 NOTE — PROGRESS NOTES
Subjective:     Yadira Suarez is a 53 y.o. female who presents for Follow-Up (W/C, (R) lower back. Pt states she started physical therapy and that seems to really help the pain)    DOI: 1/20/2024  Right low back injury. JUDY: repositioning patient with lifting and twisting motion.  Pain severity 2/10 currently. No radiation. Pain is worse 4/10 when standing from sitting position. Worse with sitting. Some improvement with tylenol and motrin. No fever. No PMH active cancer/weight stable. No prior injury or surgery to back. No second job or outside activity contributing.    Visit # 4  Percent improved: 80%  Treatment: Has been doing physical therapy, started 7 days ago and reports improvement. Also doing gentle ROM at night. Tylenol as needed  Restrictions: Currently on full duty w/o issues.    No new injuries. No new numbness or tingling. No weakness.       PMH:   No pertinent past medical history to this problem  MEDS:  Medications were reviewed in EMR  ALLERGIES:  Allergies were reviewed in EMR  SOCHX:  Works as a nurse  FH:   No pertinent family history to this problem       Objective:     /72   Pulse 85   Temp 36.6 °C (97.8 °F) (Temporal)   Resp 14   Ht 1.524 m (5')   Wt 80.3 kg (177 lb)   SpO2 98%   BMI 34.57 kg/m²     General: Well  Neuro: Neurovascular intact distally x 4  Skin: No bruising or rash  MSK: negative SLE bilaterally. Normal gait. No vertebral or paravertebral tenderness      Assessment/Plan:       1. Sacroiliac sprain, subsequent encounter    Released to Full Duty FROM   TO    Recommend continuing with physical therapy. Has reached MMI with us. Okay to use tylenol as needed. Home ROM activities. Heat.        Differential diagnosis, natural history, supportive care, and indications for immediate follow-up discussed.

## 2024-03-13 ENCOUNTER — HOSPITAL ENCOUNTER (OUTPATIENT)
Dept: RADIOLOGY | Facility: MEDICAL CENTER | Age: 54
End: 2024-03-13
Attending: STUDENT IN AN ORGANIZED HEALTH CARE EDUCATION/TRAINING PROGRAM
Payer: COMMERCIAL

## 2024-03-13 DIAGNOSIS — Z12.31 VISIT FOR SCREENING MAMMOGRAM: ICD-10-CM

## 2024-03-13 PROCEDURE — 77067 SCR MAMMO BI INCL CAD: CPT

## 2024-03-27 ENCOUNTER — TELEMEDICINE (OUTPATIENT)
Dept: ENDOCRINOLOGY | Facility: MEDICAL CENTER | Age: 54
End: 2024-03-27
Attending: INTERNAL MEDICINE
Payer: COMMERCIAL

## 2024-03-27 ENCOUNTER — TELEPHONE (OUTPATIENT)
Dept: ENDOCRINOLOGY | Facility: MEDICAL CENTER | Age: 54
End: 2024-03-27

## 2024-03-27 DIAGNOSIS — E55.9 VITAMIN D DEFICIENCY: ICD-10-CM

## 2024-03-27 DIAGNOSIS — E89.2 S/P PARATHYROIDECTOMY: ICD-10-CM

## 2024-03-27 DIAGNOSIS — M81.0 AGE-RELATED OSTEOPOROSIS WITHOUT CURRENT PATHOLOGICAL FRACTURE: ICD-10-CM

## 2024-03-27 NOTE — PROGRESS NOTES
Chief Complaint: Follow up for Hypercalcemia from Primary Hyperparathyroidism s/p parathyroidectomy.  Patient was presented for a telehealth consultation via secure and encrypted videoconferencing technology.   This encounter was conducted via Zoom . Verbal consent was obtained. Patient's identity was verified.    Virtual visit consent       This evaluation was conducted via Zoom using secure and encrypted videoconferencing technology.   The patient was (home or other private:[id number]) in the [facility].   The patient's identity was confirmed and verbal consent was obtained for this virtual visit.         HPI:     Yadira Suarez is a 53 y.o. female here for follow up of hypercalcemia from primary hyperparathyroidism status post parathyroid surgery in 2019 at an outside facility.    She has a history of left-sided hemifacial spasm and originally her other providers thought that this was related to her parathyroid but obviously it is not as her left hemifacial spasm has persisted despite resolution of her primary hyperparathyroidism and hypercalcemia    She had a surgical complication with a previous dental surgery and this could be the underlying cause of her left hemifacial spasm and she has been referred to Dr. Del Cid who then referred her to Dr. Schumacher, neurology      She does have osteoporosis based upon the bone density on June 7, 2022 showing the lowest T score of -3.4 for the left forearm  Risk factors include the hyperparathyroidism and early surgical menopause          She is now on Alendronate 70mg weekly (started early 2022)  She reports upset stomach once  in a while but is tolerating the medication so far  Her most recent labs show that her calcium and PTH levels are normal  She is not taking calcium   She is taking vitamin D3 2000IU daily  Her vitamin D did drop down on her most recent labs and we discussed going up on her vitamin D to 4000 IU daily  She exercises daily  She denies any dental  problems      She had a repeat DEXA on October 17, 2023 which showed improvement in bone mineral density for the left hip and left forearm as well as lumbar spine but the statistically significant improvement was mostly in the left hip and distal forearm.  Her FRAX scores were low.  Her lowest T-score is now -3.2 for the distal left forearm       Latest Reference Range & Units 02/21/24 13:10   Sodium 135 - 145 mmol/L 142   Potassium 3.6 - 5.5 mmol/L 4.1   Chloride 96 - 112 mmol/L 105   Co2 20 - 33 mmol/L 23   Anion Gap 7.0 - 16.0  14.0   Glucose 65 - 99 mg/dL 126 (H)   Bun 8 - 22 mg/dL 18   Creatinine 0.50 - 1.40 mg/dL 0.67   GFR (CKD-EPI) >60 mL/min/1.73 m 2 105   Calcium 8.4 - 10.2 mg/dL 9.2   Correct Calcium 8.5 - 10.5 mg/dL 9.0   AST(SGOT) 12 - 45 U/L 20   ALT(SGPT) 2 - 50 U/L 22   Alkaline Phosphatase 30 - 99 U/L 87   Total Bilirubin 0.1 - 1.5 mg/dL 0.5   Albumin 3.2 - 4.9 g/dL 4.3   Total Protein 6.0 - 8.2 g/dL 7.1   Globulin 1.9 - 3.5 g/dL 2.8   A-G Ratio g/dL 1.5   Phosphorus 2.5 - 4.5 mg/dL 4.4   Fasting Status  Non-Fasting        Latest Reference Range & Units 02/21/24 13:10   25-Hydroxy   Vitamin D 25 30 - 100 ng/mL 27 (L)   C. Telopeptide B Cross Linked pg/mL 69   TSH 0.380 - 5.330 uIU/mL 1.270   Free T-4 0.93 - 1.70 ng/dL 1.41      Latest Reference Range & Units 02/21/24 13:10   Pth, Intact 14.0 - 72.0 pg/mL 37.4       Patient's medications, allergies, and social histories were reviewed and updated as appropriate.      ROS:       CONS:     No fever, no chills   EYES:     No diplopia, no blurry vision   CV:           No chest pain, no palpitations   PULM:     No SOB, no cough, no hemoptysis.   GI:            No nausea, no vomiting, no diarrhea, no constipation   ENDO:     No polyuria, no polydipsia, no heat intolerance, no cold intolerance     Past Medical History:  Problem List:  2024-02: Hospital discharge follow-up  2024-02: Brain aneurysm  2024-02: Nonrheumatic aortic valve insufficiency  2024-02:  Hyperglycemia  2024-02: Bell's palsy  2024-02: GERD (gastroesophageal reflux disease)  2024-02: Interatrial cardiac shunt  2024-02: Chest pain  2024-02: TIA (transient ischemic attack)  2024-02: Low back pain with right-sided sciatica  2023-06: Obesity (BMI 30-39.9)  2023-04: Lymphadenopathy  2023-01: Age-related osteoporosis without current pathological   fracture  2023-01: Myokymia of left side of face  2022-04: Osteopenia  2022-04: S/P parathyroidectomy ([facility])  2019-01: Hyperparathyroidism ([facility])  2018-10: Hemifacial spasm  2014-12: Dyslipidemia  2013-12: Elective procedure for unacceptable cosmetic appearance  2013-10: Preventative health care  2012-09: S/P laparoscopic cholecystectomy  2012-09: Abdominal pain, other specified site  2012-09: S/P laparoscopic cholecystectomy  2012-09: Bradycardia  2012-09: Nausea & vomiting  2012-09: Elevated brain natriuretic peptide (BNP) level  2011-04: Essential hypertension  2011-01: S/P ERICA (total abdominal hysterectomy)  2011-01: Vitamin D deficiency  2011-01: History of vertigo  2011-01: Microscopic hematuria      Past Surgical History:  Past Surgical History:   Procedure Laterality Date    PARATHYROIDECTOMY Right 05/02/2019    ABDOMINOPLASTY  12/11/2013    Performed by Sherrie Knott M.D. at SURGERY [facility]    FAITH BY LAPAROSCOPY  9/27/2012    Performed by Jana Henriquez M.D. at SURGERY [facility] ORS    PELVISCOPY  9/16/2010    Performed by LALY FALLON at SURGERY [facility] ORS    ABDOMINAL HYSTERECTOMY TOTAL  2006    fibroid removal         Allergies:  Pork allergy     Social History:  Social History     Tobacco Use    Smoking status: Never    Smokeless tobacco: Never   Vaping Use    Vaping Use: Never used   Substance Use Topics    Alcohol use: Yes     Comment: glass of wine every six months    Drug use: No        Family History:   family history includes Cancer in her father and sister; Diabetes in her mother; Heart Disease in her brother and  father; Hypertension in her father.      PHYSICAL EXAM:   Vital signs: There were no vitals taken for this visit.  GENERAL: Well-developed, well-nourished in no apparent distress.   EYE:  No ocular asymmetry, PERRLA  HENT: Pink, moist mucous membranes.    NECK: No thyromegaly.   CARDIOVASCULAR:  No murmurs  LUNGS: Clear breath sounds  ABDOMEN: Soft, nontender   EXTREMITIES: No clubbing, cyanosis, or edema.   NEUROLOGICAL: No gross focal motor abnormalities   LYMPH: No cervical adenopathy seen.   SKIN: No rashes, lesions.       Labs:  Lab Results   Component Value Date/Time    WBC 10.7 2024 12:54 AM    WBC 5.1 2011 07:47 AM    RBC 3.93 (L) 2024 12:54 AM    RBC 4.46 2011 07:47 AM    HEMOGLOBIN 13.2 2024 12:54 AM    MCV 93.6 2024 12:54 AM    MCV 96 2011 07:47 AM    MCH 33.6 (H) 2024 12:54 AM    MCH 32.1 2011 07:47 AM    MCHC 35.9 (H) 2024 12:54 AM    RDW 43.5 2024 12:54 AM    RDW 13.2 2011 07:47 AM    MPV 9.7 2024 12:54 AM       Lab Results   Component Value Date/Time    SODIUM 141 2024 12:54 AM    POTASSIUM 3.6 2024 12:54 AM    CHLORIDE 108 2024 12:54 AM    CO2 22 2024 12:54 AM    ANION 14.0 2024 10:18 PM    GLUCOSE 98 2024 12:54 AM    BUN 21 2024 12:54 AM    CREATININE 0.57 2024 12:54 AM    CREATININE 0.91 2011 07:47 AM    CALCIUM 8.9 2024 12:54 AM    ASTSGOT 22 2024 04:15 PM    ALTSGPT 25 2024 04:15 PM    TBILIRUBIN 0.5 2024 04:15 PM    ALBUMIN 3.5 2024 12:54 AM    ALBUMIN 4.40 2023 12:36 PM    TOTPROTEIN 7.7 2024 04:15 PM    TOTPROTEIN 7.0 2023 12:36 PM    GLOBULIN 3.0 2024 04:15 PM    AGRATIO 1.6 2024 04:15 PM       Lab Results   Component Value Date/Time    TSHULTRASEN 1.290 2023 1236     No results found for: FREEDIR  No results found for: FREET3  No results found for: THYSTIMIG      Imagin2022 3:41 PM      HISTORY/REASON FOR EXAM:  Follow up for osteopenia L1-L4 levels, previous DEXA 3/2019 at [facility] (see Care Everywhere), hysterectomy, adult bone fracture, hyperparathyroidism, hypertension.     TECHNIQUE/EXAM DESCRIPTION AND NUMBER OF VIEWS:  Dual x-ray bone densitometry was performed from the L1 through L4 levels and from the proximal left femur and distal left forearm utilizing the GE Prodigy unit.     COMPARISON: Bone densitometry scan 3/26/2019 at [facility].     FINDINGS:  The lumbar spine has a mean bone mineral density of 1.019 g/cm2, with a T score of -1.3 and a Z score of -1.4.     The proximal left femur has a mean bone mineral density of 0.919 g/cm2, with a T score of -0.7 and a Z score of -0.6.     The distal left forearm has a mean bone mineral density of 0.578 g/cm2, with a T score of -3.4 and a Z score of -3.3.     When compared with the most recent study dated 3/26/2019, there has been a 1.7% decrease in the bone mineral density of the proximal left femur and a 4.0% increase in the bone mineral density of the distal left forearm and a 2.8% decrease in the bone   mineral density of the lumbar spine.     IMPRESSION:     According to the World Health Organization classification, bone mineral density of this patient is osteopenic in the lumbar spine, osteoporotic in the left forearm and normal in the left femur.     10-year Probability of Fracture:  Major Osteoporotic     7.4%  Hip     0.4%  Population      [place] ()     Based on left femur neck BMD     INTERPRETING LOCATION: [address] [place] [address] SHANE ARTHUR, [id number]      10/17/2023 11:28 AM     HISTORY/REASON FOR EXAM:  Postmenopausal, adult bone fracture, vitamin D deficiency, hypertension, osteopenic L1-L4 levels, osteoporotic distal left forearm.     TECHNIQUE/EXAM DESCRIPTION AND NUMBER OF VIEWS:  Dual x-ray bone densitometry was performed from the L1 through L4 levels and from the proximal left femur and from the distal left forearm utilizing the  GE Prodigy unit.     COMPARISON: Bone densitometry scan 6/7/2022.     FINDINGS:  The lumbar spine has a mean bone mineral density of 1.035 g/cm2, with a T score of -1.2 and a Z score of -1.0.     The proximal left femur has a mean bone mineral density of 0.960 g/cm2, with a T score of -0.4 and a Z score of -0.1.     The distal left forearm has a mean bone mineral density of 0.596 g/cm2, with a T score of -3.2 and a Z score of -3.0.     When compared with the most recent study dated 6/7/2022, there has been a 4.5% increase in the bone mineral density of the proximal left femur and a 3.1% increase in the bone mineral density of the distal left forearm and a 1.6% increase in the bone   mineral density of the lumbar spine.     IMPRESSION:     According to the World Health Organization classification, bone mineral density of this patient is osteopenic in the lumbar spine and normal in the proximal left femur.     10-year Probability of Fracture:  Major Osteoporotic     8.0%  Hip     0.4%  Population      [place] ()     Based on left femur neck BMD     INTERPRETING LOCATION: [address] [place] [address], [id number]    ASSESSMENT/PLAN:     1. Age-related osteoporosis without current pathological fracture  Stable  No interval fall or fracture   Continue alendronate 70 mg weekly  Reviewed side effects and alternatives  She should notify me if she has reactions to the medication  Discussed the importance of regular weightbearing exercise  Discussed the importance of good dental hygiene and regular dental visits  Discussed the importance of adequate calcium and vitamin supplementation  Reviewed fall precautions  She should notify me if she has any falls or fractures  Her bone density should be repeated on October 2024.  See her again in 7 months with labs      2. S/P parathyroidectomy (HCC)  Durable cure post parathyroidectomy continue monitoring    3. Vitamin D deficiency  Controlled   Increase vitamin D3 4000 IU daily  continue monitoring        Return in about 7 months (around 10/27/2024).        Thank you kindly for allowing me to participate in the endocrine care plan for this patient.    Eran Regalado MD, FACE, [facility]      CC:   KISHOR Castorena

## 2024-03-29 ENCOUNTER — RESEARCH ENCOUNTER (OUTPATIENT)
Dept: RESEARCH | Facility: MEDICAL CENTER | Age: 54
End: 2024-03-29
Payer: COMMERCIAL

## 2024-03-29 DIAGNOSIS — Z00.6 RESEARCH STUDY PATIENT: ICD-10-CM

## 2024-03-29 NOTE — RESEARCH NOTE
Confirmed with the participant which designated provider they would like study results shared with (Librado Villanueva). Patient will have an opportunity to share the results with any providers of their choosing in the future by accessing their results from [employer].

## 2024-04-03 DIAGNOSIS — E66.9 OBESITY (BMI 30-39.9): ICD-10-CM

## 2024-04-03 RX ORDER — PHENTERMINE HYDROCHLORIDE 15 MG/1
30 CAPSULE ORAL EVERY MORNING
Qty: 60 CAPSULE | Refills: 0 | Status: SHIPPED | OUTPATIENT
Start: 2024-04-03 | End: 2024-05-03

## 2024-04-18 ENCOUNTER — HOSPITAL ENCOUNTER (OUTPATIENT)
Dept: LAB | Facility: MEDICAL CENTER | Age: 54
End: 2024-04-18
Attending: STUDENT IN AN ORGANIZED HEALTH CARE EDUCATION/TRAINING PROGRAM
Payer: COMMERCIAL

## 2024-04-18 DIAGNOSIS — Z00.6 RESEARCH STUDY PATIENT: ICD-10-CM

## 2024-04-26 LAB
ELF SCORE: 8.92 - (ref 9.8–11.3)
RELATIVE RISK: NORMAL
RISK GROUP: NORMAL
RISK: 3.3 %

## 2024-05-02 DIAGNOSIS — R29.898 WEAKNESS OF BOTH LOWER EXTREMITIES: ICD-10-CM

## 2024-05-02 DIAGNOSIS — M51.36 DDD (DEGENERATIVE DISC DISEASE), LUMBAR: ICD-10-CM

## 2024-05-02 DIAGNOSIS — M62.81 MUSCLE WEAKNESS: ICD-10-CM

## 2024-05-12 LAB
APOB+LDLR+PCSK9 GENE MUT ANL BLD/T: NOT DETECTED
BRCA1+BRCA2 DEL+DUP + FULL MUT ANL BLD/T: NOT DETECTED
MLH1+MSH2+MSH6+PMS2 GN DEL+DUP+FUL M: NOT DETECTED

## 2024-05-14 ENCOUNTER — TELEMEDICINE (OUTPATIENT)
Dept: MEDICAL GROUP | Facility: LAB | Age: 54
End: 2024-05-14
Payer: COMMERCIAL

## 2024-05-14 ENCOUNTER — TELEPHONE (OUTPATIENT)
Dept: MEDICAL GROUP | Facility: LAB | Age: 54
End: 2024-05-14

## 2024-05-14 VITALS
WEIGHT: 172.6 LBS | RESPIRATION RATE: 16 BRPM | DIASTOLIC BLOOD PRESSURE: 79 MMHG | HEART RATE: 76 BPM | BODY MASS INDEX: 32.59 KG/M2 | SYSTOLIC BLOOD PRESSURE: 129 MMHG | HEIGHT: 61 IN | OXYGEN SATURATION: 97 % | TEMPERATURE: 98.2 F

## 2024-05-14 DIAGNOSIS — I10 ESSENTIAL HYPERTENSION: ICD-10-CM

## 2024-05-14 DIAGNOSIS — G45.9 TIA (TRANSIENT ISCHEMIC ATTACK): ICD-10-CM

## 2024-05-14 DIAGNOSIS — E78.5 DYSLIPIDEMIA: Chronic | ICD-10-CM

## 2024-05-14 DIAGNOSIS — Z12.11 COLON CANCER SCREENING: ICD-10-CM

## 2024-05-14 DIAGNOSIS — E66.9 OBESITY (BMI 30-39.9): ICD-10-CM

## 2024-05-14 PROCEDURE — 99214 OFFICE O/P EST MOD 30 MIN: CPT | Mod: 95 | Performed by: STUDENT IN AN ORGANIZED HEALTH CARE EDUCATION/TRAINING PROGRAM

## 2024-05-14 RX ORDER — PHENTERMINE HYDROCHLORIDE 37.5 MG/1
37.5 CAPSULE ORAL EVERY MORNING
Qty: 30 CAPSULE | Refills: 0 | Status: SHIPPED | OUTPATIENT
Start: 2024-05-14 | End: 2024-06-13

## 2024-05-14 RX ORDER — SEMAGLUTIDE 0.25 MG/.5ML
0.25 INJECTION, SOLUTION SUBCUTANEOUS
Qty: 2 ML | Refills: 0 | Status: SHIPPED | OUTPATIENT
Start: 2024-05-14

## 2024-05-14 ASSESSMENT — FIBROSIS 4 INDEX: FIB4 SCORE: 0.99

## 2024-05-14 NOTE — TELEPHONE ENCOUNTER
MEDICATION PRIOR AUTHORIZATION NEEDED:    1. Name of Medication: Wegovy    2. Requested By (Name of Pharmacy): [facility]     3. Is insurance on file current? yes    4. What is the name & phone number of the 3rd party payor? Key: [id number])

## 2024-05-14 NOTE — PROGRESS NOTES
Subjective:     CC:   Chief Complaint   Patient presents with    Medication Refill        HPI:       Problem   Obesity (Bmi 30-39.9)    -patient over the past 2 years has gained 48 pounds   -due her leg condition she has not been able to exercise as she used.   -currently around 180lbs     -tries to eat healthy     7/21/23  -original weight: 180lbs with clothes, BMI 34.16  -current weight: 172lbs with clothes, BMI 32.  -patient lost 8lbs   -patient was started on phent 15mg previously, she only complains of tolerable dry mouth  -patient is also consuming foods with prebiotics   -patient is also walking    9/1/23  -original weight: 180lbs with clothes, BMI 34.16  -current weight: 172lbs with clothes, BMI 32.  -she works out 3x/week. She does 20 minutes of cardio followed by weights after with alternating with lower body and upper body     10/17/23  -original weight: 180lbs with clothes, BMI 34.16  -current weight: 166.8lbs without clothes, BMI 31.52  -patient has lost roughly 5 lbs   -patient is staying active, she was traveling for the past couple of weeks but was hiking quite bit       12/6/23  -Original weight was close 180 pounds  - Current weight without clothes 163 pounds  -Patient has been off phentermine for 1 month  - She is been unable to exercise for the past 1 week due to being busy with work  - She is starting to count her macros and is shifting her diet    5/14/24  -patient is currently 172lbs   -she is walking 1-2 miles 3 days/week           Current Outpatient Medications Ordered in Epic   Medication Sig Dispense Refill    phentermine 37.5 MG capsule Take 1 Capsule by mouth every morning for 30 days. 30 Capsule 0    Semaglutide (WEGOVY) 0.25 MG/0.5ML Solution Auto-injector Pen-injector Inject 0.5 mL under the skin every 7 days. 2 mL 0    acetaminophen (TYLENOL) 500 MG Tab Take 500 mg by mouth 2 times a day as needed.      aspirin 81 MG EC tablet Take 81 mg by mouth every day.      OnabotulinumtoxinA  "(BOTOX INJ) Inject 1 Dose as directed every 6 months. Eye muscle spasms      losartan (COZAAR) 50 MG Tab Take 1 Tablet by mouth every day. 30 Tablet 2    atorvastatin (LIPITOR) 40 MG Tab Take 1 Tablet by mouth every evening. 30 Tablet 2    calcium carbonate (TUMS EXTRA STRENGTH 750) 750 MG chewable tablet Chew 1 Tablet as needed (Muscle cramps).      alendronate (FOSAMAX) 70 MG Tab Take 1 Tablet by mouth every 7 days. 12 Tablet 3    Magnesium Glycinate 100 MG Cap Take 200 mg by mouth every day at 6 PM.      Cholecalciferol (VITAMIN D) 2000 UNIT Tab Take 2,000 Units by mouth every day. 4,000 units daily      omeprazole (PRILOSEC) 20 MG delayed-release capsule Take 20 mg by mouth every day.      vitamin e (VITAMIN E) 400 UNIT Cap Take 400 Units by mouth every day.      Multiple Vitamin (MULTIVITAMIN PO) Take 1 Tab by mouth every day.       No current [facility]-ordered facility-administered medications on file.           ROS:  ROS    Objective:     Exam:  /79   Pulse 76   Temp 36.8 °C (98.2 °F)   Resp 16   Ht 1.549 m (5' 1\")   Wt 78.3 kg (172 lb 9.6 oz)   SpO2 97%   BMI 32.61 kg/m²  Body mass index is 32.61 kg/m².    Physical Exam  Constitutional:       General: She is not in acute distress.     Appearance: She is not ill-appearing.   Pulmonary:      Effort: Pulmonary effort is normal.   Neurological:      Mental Status: She is alert.   Psychiatric:         Mood and Affect: Mood normal.         Behavior: Behavior normal.         Thought Content: Thought content normal.         Judgment: Judgment normal.                   Assessment & Plan:     Problem List Items Addressed This Visit       Dyslipidemia (Chronic)    Relevant Medications    Semaglutide (WEGOVY) 0.25 MG/0.5ML Solution Auto-injector Pen-injector    Essential hypertension    Relevant Medications    Semaglutide (WEGOVY) 0.25 MG/0.5ML Solution Auto-injector Pen-injector    Obesity (BMI 30-39.9)     Chronic-not completely controlled  - Due to patient's " history of TIA, hypertension, dyslipidemia, obesity and the fact that patient is not been improving with phentermine to the desired effects will prescribe Wegovy.  Prescription discussed with patient  - I do anticipate their prior authorization with the insurance therefore meantime will increase to 37.5 mg  -PDMP checked, controlled substance previously signed         Relevant Medications    phentermine 37.5 MG capsule    Semaglutide (WEGOVY) 0.25 MG/0.5ML Solution Auto-injector Pen-injector    TIA (transient ischemic attack)    Relevant Medications    Semaglutide (WEGOVY) 0.25 MG/0.5ML Solution Auto-injector Pen-injector     Other Visit Diagnoses       Colon cancer screening        Relevant Orders    Referral to GI for Colonoscopy              Please note that this dictation was created using voice recognition software. I have made every reasonable attempt to correct obvious errors, but I expect that there are errors of grammar and possibly content that I did not discover before finalizing the note.

## 2024-05-14 NOTE — ASSESSMENT & PLAN NOTE
Chronic-not completely controlled  - Due to patient's history of TIA, hypertension, dyslipidemia, obesity and the fact that patient is not been improving with phentermine to the desired effects will prescribe Wegovy.  Prescription discussed with patient  - I do anticipate their prior authorization with the insurance therefore meantime will increase to 37.5 mg  -PDMP checked, controlled substance previously signed

## 2024-05-21 ENCOUNTER — TELEPHONE (OUTPATIENT)
Dept: NEUROLOGY | Facility: MEDICAL CENTER | Age: 54
End: 2024-05-21
Payer: COMMERCIAL

## 2024-05-30 ENCOUNTER — OFFICE VISIT (OUTPATIENT)
Dept: NEUROLOGY | Facility: MEDICAL CENTER | Age: 54
End: 2024-05-30
Attending: INTERNAL MEDICINE
Payer: COMMERCIAL

## 2024-05-30 ENCOUNTER — HOSPITAL ENCOUNTER (OUTPATIENT)
Dept: LAB | Facility: MEDICAL CENTER | Age: 54
End: 2024-05-30
Attending: INTERNAL MEDICINE
Payer: COMMERCIAL

## 2024-05-30 VITALS
DIASTOLIC BLOOD PRESSURE: 62 MMHG | OXYGEN SATURATION: 96 % | BODY MASS INDEX: 33.09 KG/M2 | WEIGHT: 175.27 LBS | HEART RATE: 82 BPM | RESPIRATION RATE: 18 BRPM | SYSTOLIC BLOOD PRESSURE: 108 MMHG | TEMPERATURE: 97.6 F | HEIGHT: 61 IN

## 2024-05-30 DIAGNOSIS — G51.32 HEMIFACIAL SPASM OF LEFT SIDE OF FACE: ICD-10-CM

## 2024-05-30 DIAGNOSIS — G25.2 ORTHOSTATIC TREMOR: ICD-10-CM

## 2024-05-30 LAB
CHOLEST SERPL-MCNC: 131 MG/DL (ref 100–199)
HDLC SERPL-MCNC: 66 MG/DL
LDLC SERPL CALC-MCNC: 51 MG/DL
TRIGL SERPL-MCNC: 69 MG/DL (ref 0–149)

## 2024-05-30 RX ORDER — GABAPENTIN 100 MG/1
100 CAPSULE ORAL 3 TIMES DAILY
Qty: 270 CAPSULE | Refills: 3 | Status: SHIPPED | OUTPATIENT
Start: 2024-05-30 | End: 2025-05-30

## 2024-05-30 ASSESSMENT — FIBROSIS 4 INDEX: FIB4 SCORE: 0.99

## 2024-05-30 NOTE — PATIENT INSTRUCTIONS
- gabapentin 100mg, 1 capsule, 2-3x/day  - start Botox for blepharospasm  - consider magnesium threonate, 200-400mg

## 2024-05-30 NOTE — PROGRESS NOTES
[facility]  [address] [place] [address], [address]. [place] [address]  Phone: [phone] Patient Name: Yadira Suarez  : 1970  MRN: [id number]     ASSESSMENT / PLAN       Yadira Suarez is a 53 y.o. RHD female presenting for tremors and facial weakness    LEFT hemifacial spasm  Onset . MRI Brain 2024 unremarkable for brain stem or facial nerve findings. Consider repeating cranial nerve protocol with contrast in future.  Exam notable for left obicularis oculi and trace zygomaticus and risorius spasms    - Start Botox 100U approval    INFORMED CONSENT   The risks and benefits of the procedure were explained to the patient, and all questions were answered. Adverse effects from toxin injection include but are not limited to: excessive weakness, infection, breathing and/or swallowing difficulty.  Common adverse effects from the injection itself are pain and bruising. The patient demonstrated good understanding of risks and benefits and consents to botulinum toxin injection.     Proposed injection pattern:  Muscle Right Left Total # Units   Orbicularis oculi  2U x6 12   Pre-tarsal plate      Frontalis      Procerus - -          Nasalis      Zygomaticus  2U 2   Risorius  2U 2   Mentalis      Platysma        100 U of of botulinum were diluted in 1 ml of saline. 30 gauge needles.  Total units used: 16U  Total units wasted: 84U    Bilateral leg tremors  Working diagnosis as orthostatic tremor: worse with standing, improved when walking  - MRI Lumbar pending   - consider magnesium threonate, 200-400mg  - START gabapentin 100mg, 1 capsule, 2-3x/day    Dizziness  - consider ENT/vestibular testing if not improving with continued PT. Normal saccades, does not appear vertiginous in nature    Possible right MCA 1mm aneurysm  - repeat CTA Head in 2025    Per cardiology:  - hypertension: losartan  - cardiac shunt: aspirin 81mg daily. repeat echocardiogram in 2 to 3 years. Dilated  atrium may place her at higher risk of developing atrial fibrillation    LEFT Bell's palsy  Onset 1/2024, improving    Orders Placed This Encounter    gabapentin (NEURONTIN) 100 MG Cap     - Return when approved for Botox,, for Botox no ultrasound.    Dillan Hernández DO  Neurology, Movement Disorders Specialist    BILLING DOCUMENTATION:   I spent 40 minutes reviewing the medical record, interviewing and examining the patient, discussing diagnosis and treatment, and coordinating care.        HISTORY OF PRESENT ILLNESS      Yadira Suarez is a 53 y.o. RHD female presenting for tremors and facial weakness    PMHx: total hysterectomy, ovarian cancer, hemifacial spasm, hyperparathyroidism status post parathyroidectomy 2019  Working as a RN    Initial HPI 02/29/24  Prior neurologist:   - Dr. Vivien Juares at [facility]  - Dr. Schumacher at [facility]  - Dr. Patel at [facility]    Left facial weakness  Facial weakness improving 90%  2/21/2024: while at work, had slurred speech. Left facial droop thought to be secondary to Bell's palsy. Discharged with the following changes:    Aspirin 81mg  Atorvastatin 40mg  Losartan 25mg --> 50mg    Left blepharospasm  Left blepharospasm since 2018: left obicularis oculi and rhizorius regions spasm. No preceding injury or rash, no prior episodes of bell's palsy before 2024 episode.   Dr. Schumacher did botox x3 sessions. Left face, 30U total.  - bothersome at work, interested in starting injections again    Vision change  2018: left monocular left visual field loss, thought to be lacunar stroke. MRI negative for acute stroke. Since then, visual field has improved close to baseline. Retinal testing normal. Optometry exam 9/2023 notes reviewed: VF testing normal. Normal fundus exam    Leg shakiness  Parathyroid surgery 2019. Progressively felt weaker since then.     2021: leg weakness and shakiness. Muscle aches worsening. Last time active with crossfit, hiking, running 50+ miles in 2022.  "Weight change 50+ in past year. Shakiness worse with prolonged standing. Some pain going sitting to stand, radiating from waist and down. 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 Prior neurologist thought to be orthostatic tremor but didn't start medication to treat (thought to be antiseizure medication)  - woke up this morning, \"like nervous feeling\". Less severe sitting to stand when in afternoon. Active movement suppresses it.   - deep achy discomfort from waist down  - endurance better: doing peloton  - wine didn't worsen it or improve it    Recent right leg injury when lifting at work, resulting in lateral paresthesias, lateral foot, and underside of foot  - Better now    Dizziness  Valsalva sneezing, coughing,  causes \"motion\" side to side. Last happened on Friday 2/24/2024. Frequency approx 1/month. No headaches associated. Unclear duration of episode. Loud sounds doesn't trigger it. No vestibular testing in past.           1/17/2023     1:00 PM 4/29/2022     8:30 AM 11/6/2017     4:00 PM 12/26/2014     4:20 PM   PHQ-9 Screening   Little interest or pleasure in doing things    Not at all   Little interest or pleasure in doing things 0 - not at all 0 - not at all 0 - not at all    Feeling down, depressed, or hopeless    Not at all   Feeling down, depressed, or hopeless 0 - not at all 0 - not at all 0 - not at all    PHQ-2 Total Score    0   PHQ-2 Total Score 0 0 0        Past Medical History:   Diagnosis Date    Anesthesia     naausea    Hypertension     Infectious disease     MRSA/VRE - works in hospital    Other specified symptom associated with female genital organs     hysterectomy    Pain     Parathyroid disorder (HCC)     Stroke (HCC)        Past Surgical History:   Procedure Laterality Date    PARATHYROIDECTOMY Right 05/02/2019    ABDOMINOPLASTY  12/11/2013    Performed by Sherrie Knott M.D. at [facility] BY LAPAROSCOPY  9/27/2012    Performed by Jana" CARRI Henriquez M.D. at SURGERY [facility] ORS    PELVISCOPY  9/16/2010    Performed by LALY FALLON at SURGERY [facility] ORS    ABDOMINAL HYSTERECTOMY TOTAL  2006    fibroid removal        Family History   Problem Relation Age of Onset    Diabetes Mother         diet controlled    Hypertension Father     Heart Disease Father         MI age 61    Cancer Father         prostate ca    Cancer Sister         ovarian lesion    Heart Disease Brother         blood clot heart       Social History     Socioeconomic History    Marital status:      Spouse name: Dex    Number of children: Not on file    Years of education: Not on file    Highest education level: Bachelor's degree (e.g., BA, AB, BS)   Occupational History    Occupation: ER      Employer: [employer]   Tobacco Use    Smoking status: Never    Smokeless tobacco: Never   Vaping Use    Vaping status: Never Used   Substance and Sexual Activity    Alcohol use: Yes     Comment: glass of wine every six months    Drug use: No    Sexual activity: Yes     Partners: Male     Birth control/protection: Surgical   Other Topics Concern    Not on file   Social History Narrative    Not on file     Social Determinants of Health     Financial Resource Strain: Low Risk  (2/28/2024)    Overall Financial Resource Strain (CARDIA)     Difficulty of Paying Living Expenses: Not very hard   Food Insecurity: No Food Insecurity (2/28/2024)    Hunger Vital Sign     Worried About Running Out of Food in the Last Year: Never true     Ran Out of Food in the Last Year: Never true   Transportation Needs: No Transportation Needs (2/28/2024)    PRAPARE - Transportation     Lack of Transportation (Medical): No     Lack of Transportation (Non-Medical): No   Physical Activity: Insufficiently Active (2/28/2024)    Exercise Vital Sign     Days of Exercise per Week: 3 days     Minutes of Exercise per Session: 30 min   Stress: No Stress Concern Present (2/28/2024)    [place] [employer] - Occupational Stress Questionnaire     Feeling of Stress : Only a little   Social Connections: Socially Integrated (2/28/2024)    Social Connection and Isolation Panel [NHANES]     Frequency of Communication with Friends and Family: More than three times a week     Frequency of Social Gatherings with Friends and Family: Twice a week     Attends [employer] Services: 1 to 4 times per year     Active Member of Clubs or Organizations: Yes     Attends Club or Organization Meetings: 1 to 4 times per year     Marital Status:    Intimate Partner Violence: Not on file   Housing Stability: Low Risk  (2/28/2024)    Housing Stability Vital Sign     Unable to Pay for Housing in the Last Year: No     Number of Places Lived in the Last Year: 1     Unstable Housing in the Last Year: No       Current Outpatient Medications   Medication    gabapentin (NEURONTIN) 100 MG Cap    phentermine 37.5 MG capsule    Semaglutide (WEGOVY) 0.25 MG/0.5ML Solution Auto-injector Pen-injector    aspirin 81 MG EC tablet    OnabotulinumtoxinA (BOTOX INJ)    losartan (COZAAR) 50 MG Tab    atorvastatin (LIPITOR) 40 MG Tab    calcium carbonate (TUMS EXTRA STRENGTH 750) 750 MG chewable tablet    alendronate (FOSAMAX) 70 MG Tab    Magnesium Glycinate 100 MG Cap    Cholecalciferol (VITAMIN D) 2000 UNIT Tab    omeprazole (PRILOSEC) 20 MG delayed-release capsule    Multiple Vitamin (MULTIVITAMIN PO)     No current facility-administered medications for this visit.       Allergies   Allergen Reactions    Pork Allergy Rash and Itching     itching       DATA / RESULTS      25-Hydroxy   Vitamin D 25   Date Value Ref Range Status   02/21/2024 27 (L) 30 - 100 ng/mL Final     Comment:     Adult Ranges:   <20 ng/mL - Deficiency  20-29 ng/mL - Insufficiency   ng/mL - Sufficiency  Electrochemiluminescence binding assay performed using Roche kelton e  immunoassay analyzer.  The Elecsys Vitamin D total II assay is intended for  the quantitative  determination of total 25 hydroxyvitamin D in human serum  and plasma. This assay is to be used as an aid in the assessment of vitamin  D sufficiency in adults.       Vitamin B12 -True Cobalamin   Date Value Ref Range Status   02/21/2024 569 211 - 911 pg/mL Final     TSH   Date Value Ref Range Status   02/21/2024 1.270 0.380 - 5.330 uIU/mL Final     Comment:     The 2011 American Thyroid Association (JUANA) guidelines  recommended that the interpretation of thyroid function in  pregnancy be based on trimester specific reference ranges.    1st Trimester  0.100-2.500 mIU/L  2nd Trimester  0.200-3.000 mIU/L  3rd Trimester  0.300-3.500 mIU/L    These established reference ranges have not been validated  at [employer].       Glycohemoglobin   Date Value Ref Range Status   02/11/2019 5.2 4.0 - 5.6 % Final     LDL   Date Value Ref Range Status   02/24/2024 107 (H) <100 mg/dL Final     MRI C-spine 3/2023:  1.  Mild degenerative disease in the cervical spine as described above.  2.  There are multiple enlarged bilateral jugulodigastric and submandibular lymph nodes. The right submandibular lymph node measures an approximately 1.3 cm. This lymph nodes likely represents reactive lymphadenopathy. However, the possibility of   pathological lymphadenopathy cannot be excluded. Please correlate with the clinical examination.     MRI T-spine 3/2023  MRI of the thoracic spine without and with contrast within normal limits except for small central disc protrusion at T6-7 without spinal or neural foraminal stenosis..     Echocardiogram 2/2024:  Normal left ventricular systolic function.  Grade II diastolic dysfunction.  Moderately dilated left atrium.  Evidence of early (0-5 beats) right to left shunt suggestive of intracardiac shunt (ASD or PFO).    US Venous Leg 2/2024:   No acute thrombosis is identified.     MRI Brain 2/2024: reviewed and agree with report  1.  No acute abnormality.  2.  Mild chronic microvascular  "ischemic disease.  3.  Mild tonsillar ectopia.  4.  There has been no significant interval change.    CTA Neck 2/2024:  CT angiogram of the neck within normal limits.     CTA Head 2/2024:  1.  No evidence of intracranial vascular occlusion.   2.  Possible tiny 1 mm aneurysm emanating from the right middle cerebral artery distribution from an M2 branch.    OBJECTIVE      Vitals:    05/30/24 0957   BP: 108/62   BP Location: Left arm   Patient Position: Sitting   BP Cuff Size: Adult   Pulse: 82   Resp: 18   Temp: 36.4 °C (97.6 °F)   TempSrc: Temporal   SpO2: 96%   Weight: 79.5 kg (175 lb 4.3 oz)   Height: 1.549 m (5' 1\")     Physical Exam     Abnormal movements:    UPDRS Right Left   Finger tapping 0 0   Hand Movement 0 0   Toe Tapping 0 0   Leg Agility 0 0   Rigidity 0 0   Rest Tremor 0 0   Postural Tremor <1cm <1cm   Kinetic Tremor <1cm <1cm      No tremors in legs when standing.     Gait:   Posture - normal   Base - narrow   Stride length - normal   Arm swing - normal   Speed - normal  Shuffling/freezing - none  U-Turn - normal   Heel, toe, and tandem - normal   Romberg: negative.   Tandem stance: normal    PROCEDURE     N/A     "

## 2024-05-31 ENCOUNTER — TELEPHONE (OUTPATIENT)
Dept: CARDIOLOGY | Facility: MEDICAL CENTER | Age: 54
End: 2024-05-31

## 2024-05-31 ENCOUNTER — APPOINTMENT (OUTPATIENT)
Dept: CARDIOLOGY | Facility: MEDICAL CENTER | Age: 54
End: 2024-05-31
Attending: INTERNAL MEDICINE
Payer: COMMERCIAL

## 2024-06-12 ENCOUNTER — APPOINTMENT (OUTPATIENT)
Dept: CARDIOLOGY | Facility: MEDICAL CENTER | Age: 54
End: 2024-06-12
Attending: INTERNAL MEDICINE
Payer: COMMERCIAL

## 2024-06-17 ENCOUNTER — HOSPITAL ENCOUNTER (OUTPATIENT)
Dept: RADIOLOGY | Facility: MEDICAL CENTER | Age: 54
End: 2024-06-17
Attending: STUDENT IN AN ORGANIZED HEALTH CARE EDUCATION/TRAINING PROGRAM
Payer: COMMERCIAL

## 2024-06-17 VITALS — BODY MASS INDEX: 31.91 KG/M2 | WEIGHT: 169 LBS | HEIGHT: 61 IN

## 2024-06-17 DIAGNOSIS — R29.898 WEAKNESS OF BOTH LOWER EXTREMITIES: ICD-10-CM

## 2024-06-17 DIAGNOSIS — M51.36 DDD (DEGENERATIVE DISC DISEASE), LUMBAR: ICD-10-CM

## 2024-06-17 PROCEDURE — A9270 NON-COVERED ITEM OR SERVICE: HCPCS | Performed by: RADIOLOGY

## 2024-06-17 PROCEDURE — 72148 MRI LUMBAR SPINE W/O DYE: CPT

## 2024-06-17 PROCEDURE — 700102 HCHG RX REV CODE 250 W/ 637 OVERRIDE(OP): Performed by: RADIOLOGY

## 2024-06-17 RX ORDER — DIAZEPAM 5 MG/1
5 TABLET ORAL
Status: COMPLETED | OUTPATIENT
Start: 2024-06-17 | End: 2024-06-17

## 2024-06-17 RX ADMIN — DIAZEPAM 5 MG: 5 TABLET ORAL at 09:16

## 2024-06-17 ASSESSMENT — FIBROSIS 4 INDEX: FIB4 SCORE: 0.99

## 2024-06-17 NOTE — DISCHARGE INSTRUCTIONS
MRI ADULT DISCHARGE INSTRUCTIONS    You have been medicated today for your scan. Please follow the instructions below to ensure your safe recovery. If you have any questions or problems, feel free to call us at [phone] or [phone].     1.   Have someone stay with you to assist you as needed.    2.   Do not drive or operate any mechanical devices.    3.   Do not perform any activity that requires concentration. Make no major decisions over the next 24 hours.     4.   Be careful changing positions from laying down to sitting or standing, as you may become dizzy.     5.   Do not drink alcohol for 48 hours.    6.   There are no restrictions for eating your normal meals. Drink fluids.    7.   You may continue your usual medications for pain, tranquilizers, muscle relaxants or sedatives when awake.     8.   Tomorrow, you may continue your normal daily activities.     9.   Pressure dressing on 10 - 15 minutes. If swelling or bleeding occurs when removed, continue placing direct pressure on injection site for another 5 minutes, or until bleeding stops.   Diazepam (VALIUM) Oral solution  What is this medicine?  You were prescribed DIAZEPAM (dye [place] e susana) for the procedure you had today. This medication is a benzodiazepine. It is used to treat anxiety and nervousness. It also can help treat alcohol withdrawal, relax muscles, and treat certain types of seizures.  This medicine may be used for other purposes; ask your health care provider or pharmacist if you have questions.  What side effects may I notice from receiving this medicine?  Side effects that you should report to your doctor or health care professional as soon as possible:  allergic reactions like skin rash, itching or hives, swelling of the face, lips, or tongue  angry, confused, depressed, other mood changes  breathing problems  feeling faint or lightheaded, falls  muscle cramps  problems with balance, talking, walking  restlessness  tremors  trouble passing  urine or change in the amount of urine  unusually weak or tired  Side effects that usually do not require medical attention (report to your doctor or health care professional if they continue or are bothersome):  difficulty sleeping, nightmares  dizziness, drowsiness, clumsiness, or unsteadiness, a hangover effect  headache  nausea, vomiting  This list may not describe all possible side effects. Call your doctor for medical advice about side effects. You may report side effects to FDA at 2-016-BYC-3351.   I have been informed of and understand the above discharge instructions.

## 2024-06-20 DIAGNOSIS — M51.36 BULGING LUMBAR DISC: ICD-10-CM

## 2024-06-20 DIAGNOSIS — M51.36 DDD (DEGENERATIVE DISC DISEASE), LUMBAR: ICD-10-CM

## 2024-06-24 ENCOUNTER — OFFICE VISIT (OUTPATIENT)
Dept: CARDIOLOGY | Facility: MEDICAL CENTER | Age: 54
End: 2024-06-24
Attending: INTERNAL MEDICINE
Payer: COMMERCIAL

## 2024-06-24 ENCOUNTER — TELEPHONE (OUTPATIENT)
Dept: CARDIOLOGY | Facility: MEDICAL CENTER | Age: 54
End: 2024-06-24

## 2024-06-24 VITALS
HEART RATE: 80 BPM | BODY MASS INDEX: 32.77 KG/M2 | WEIGHT: 173.6 LBS | HEIGHT: 61 IN | OXYGEN SATURATION: 97 % | SYSTOLIC BLOOD PRESSURE: 112 MMHG | DIASTOLIC BLOOD PRESSURE: 78 MMHG | RESPIRATION RATE: 14 BRPM

## 2024-06-24 DIAGNOSIS — Q24.8 INTERATRIAL CARDIAC SHUNT: ICD-10-CM

## 2024-06-24 DIAGNOSIS — I10 PRIMARY HYPERTENSION: ICD-10-CM

## 2024-06-24 DIAGNOSIS — E78.5 DYSLIPIDEMIA: Chronic | ICD-10-CM

## 2024-06-24 DIAGNOSIS — I83.813 VARICOSE VEINS OF BOTH LOWER EXTREMITIES WITH PAIN: ICD-10-CM

## 2024-06-24 LAB — EKG IMPRESSION: NORMAL

## 2024-06-24 PROCEDURE — 93010 ELECTROCARDIOGRAM REPORT: CPT | Performed by: INTERNAL MEDICINE

## 2024-06-24 PROCEDURE — 3078F DIAST BP <80 MM HG: CPT | Performed by: INTERNAL MEDICINE

## 2024-06-24 PROCEDURE — 93005 ELECTROCARDIOGRAM TRACING: CPT | Performed by: INTERNAL MEDICINE

## 2024-06-24 PROCEDURE — 99214 OFFICE O/P EST MOD 30 MIN: CPT | Mod: 25 | Performed by: INTERNAL MEDICINE

## 2024-06-24 PROCEDURE — 3074F SYST BP LT 130 MM HG: CPT | Performed by: INTERNAL MEDICINE

## 2024-06-24 PROCEDURE — 99212 OFFICE O/P EST SF 10 MIN: CPT | Performed by: INTERNAL MEDICINE

## 2024-06-24 PROCEDURE — 99213 OFFICE O/P EST LOW 20 MIN: CPT | Performed by: INTERNAL MEDICINE

## 2024-06-24 RX ORDER — LOSARTAN POTASSIUM 50 MG/1
50 TABLET ORAL DAILY
Qty: 100 TABLET | Refills: 4 | Status: SHIPPED | OUTPATIENT
Start: 2024-06-24

## 2024-06-24 RX ORDER — ATORVASTATIN CALCIUM 40 MG/1
40 TABLET, FILM COATED ORAL EVERY EVENING
Qty: 100 TABLET | Refills: 4 | Status: SHIPPED | OUTPATIENT
Start: 2024-06-24

## 2024-06-24 ASSESSMENT — ENCOUNTER SYMPTOMS
ORTHOPNEA: 0
HALLUCINATIONS: 0
SENSORY CHANGE: 0
DIZZINESS: 0
CHILLS: 0
VOMITING: 0
FEVER: 0
BLOOD IN STOOL: 0
NAUSEA: 0
HEADACHES: 0
DOUBLE VISION: 0
PND: 0
CLAUDICATION: 0
COUGH: 0
FALLS: 0
SHORTNESS OF BREATH: 0
SPEECH CHANGE: 0
PALPITATIONS: 0
LOSS OF CONSCIOUSNESS: 0
EYE PAIN: 0
WEIGHT LOSS: 0
BRUISES/BLEEDS EASILY: 0
ABDOMINAL PAIN: 0
MYALGIAS: 0
EYE DISCHARGE: 0
BLURRED VISION: 0
DEPRESSION: 0

## 2024-06-24 ASSESSMENT — FIBROSIS 4 INDEX: FIB4 SCORE: 0.99

## 2024-06-24 NOTE — PROGRESS NOTES
Chief Complaint   Patient presents with    Follow-Up     DX: Chest pain, unspecified type Dyslipidemia       Subjective     Alexandra Suarez is a 53 y.o. female who presents today for cardiac care and management of hypertension and dyslipidemia.  She did have Bell's palsy earlier this year. She was treated with steroids. There was also concern for TIA vs stroke. She did have TTE which showed right to left shunt with positive bubbles study.  Patient does see a neurologist who was not sure if it was Bell's palsy versus TIA or stroke.    I personally interpreted the images of her echocardiogram.    I have independently interpreted and reviewed blood tests results with patient in clinic which shows LDL level of 51, triglycerides level of 69, GFR of 109, NT pro BNP of 109, K of 3.6.    I have personally interpreted EKG today with patient, there is no evidence of acute coronary syndrome, no evidence of prior infarct, normal MA and QT interval, no significant conduction disease. Sinus rhythm.    In the interim, patient has been doing well without having any symptoms. Patient denies having chest pain, dyspnea, palpitation, presyncope, syncope episodes. Able to climb up at least 2 flights of stairs.      Past Medical History:   Diagnosis Date    Anesthesia     naausea    Hypertension     Infectious disease     MRSA/VRE - works in hospital    Other specified symptom associated with female genital organs     hysterectomy    Pain     Parathyroid disorder (HCC)     Stroke (HCC)      Past Surgical History:   Procedure Laterality Date    PARATHYROIDECTOMY Right 05/02/2019    ABDOMINOPLASTY  12/11/2013    Performed by Sherrie Knott M.D. at SURGERY [facility] ORS    FAITH BY LAPAROSCOPY  9/27/2012    Performed by Jana Henriquez M.D. at SURGERY [facility] ORS    PELVISCOPY  9/16/2010    Performed by LALY FALLON at SURGERY [facility] ORS    ABDOMINAL HYSTERECTOMY TOTAL  2006    fibroid removal      Family History    Problem Relation Age of Onset    Diabetes Mother         diet controlled    Hypertension Father     Heart Disease Father         MI age 61    Cancer Father         prostate ca    Cancer Sister         ovarian lesion    Heart Disease Brother         blood clot heart     Social History     Socioeconomic History    Marital status:      Spouse name: Federico    Number of children: Not on file    Years of education: Not on file    Highest education level: Bachelor's degree (e.g., BA, AB, BS)   Occupational History    Occupation: ER      Employer: [employer]   Tobacco Use    Smoking status: Never    Smokeless tobacco: Never   Vaping Use    Vaping status: Never Used   Substance and Sexual Activity    Alcohol use: Yes     Comment: glass of wine every six months    Drug use: No    Sexual activity: Yes     Partners: Male     Birth control/protection: Surgical   Other Topics Concern    Not on file   Social History Narrative    Not on file     Social Determinants of Health     Financial Resource Strain: Low Risk  (2/28/2024)    Overall Financial Resource Strain (CARDIA)     Difficulty of Paying Living Expenses: Not very hard   Food Insecurity: No Food Insecurity (2/28/2024)    Hunger Vital Sign     Worried About Running Out of Food in the Last Year: Never true     Ran Out of Food in the Last Year: Never true   Transportation Needs: No Transportation Needs (2/28/2024)    PRAPARE - Transportation     Lack of Transportation (Medical): No     Lack of Transportation (Non-Medical): No   Physical Activity: Insufficiently Active (2/28/2024)    Exercise Vital Sign     Days of Exercise per Week: 3 days     Minutes of Exercise per Session: 30 min   Stress: No Stress Concern Present (2/28/2024)    [employer] - Occupational Stress Questionnaire     Feeling of Stress : Only a little   Social Connections: Socially Integrated (2/28/2024)    Social Connection and Isolation Panel [NHANES]     Frequency of  Communication with Friends and Family: More than three times a week     Frequency of Social Gatherings with Friends and Family: Twice a week     Attends Yazidism Services: 1 to 4 times per year     Active Member of Clubs or Organizations: Yes     Attends Club or Organization Meetings: 1 to 4 times per year     Marital Status:    Intimate Partner Violence: Not on file   Housing Stability: Low Risk  (2/28/2024)    Housing Stability Vital Sign     Unable to Pay for Housing in the Last Year: No     Number of Places Lived in the Last Year: 1     Unstable Housing in the Last Year: No     Allergies   Allergen Reactions    Pork Allergy Rash and Itching     itching     Outpatient Encounter Medications as of 6/24/2024   Medication Sig Dispense Refill    atorvastatin (LIPITOR) 40 MG Tab Take 1 Tablet by mouth every evening. 100 Tablet 4    losartan (COZAAR) 50 MG Tab Take 1 Tablet by mouth every day. 100 Tablet 4    aspirin 81 MG EC tablet Take 81 mg by mouth every day.      calcium carbonate (TUMS EXTRA STRENGTH 750) 750 MG chewable tablet Chew 1 Tablet as needed (Muscle cramps).      alendronate (FOSAMAX) 70 MG Tab Take 1 Tablet by mouth every 7 days. 12 Tablet 3    Magnesium Glycinate 100 MG Cap Take 200 mg by mouth every day at 6 PM.      Cholecalciferol (VITAMIN D) 2000 UNIT Tab Take 2,000 Units by mouth every day. 4,000 units daily      omeprazole (PRILOSEC) 20 MG delayed-release capsule Take 20 mg by mouth every day.      Multiple Vitamin (MULTIVITAMIN PO) Take 1 Tab by mouth every day.      gabapentin (NEURONTIN) 100 MG Cap Take 1 Capsule by mouth 3 times a day. (Patient not taking: Reported on 6/24/2024) 270 Capsule 3    Semaglutide (WEGOVY) 0.25 MG/0.5ML Solution Auto-injector Pen-injector Inject 0.5 mL under the skin every 7 days. (Patient not taking: Reported on 6/24/2024) 2 mL 0    OnabotulinumtoxinA (BOTOX INJ) Inject 1 Dose as directed every 6 months. Eye muscle spasms (Patient not taking: Reported on  "6/24/2024)      [DISCONTINUED] losartan (COZAAR) 50 MG Tab Take 1 Tablet by mouth every day. 30 Tablet 2    [DISCONTINUED] atorvastatin (LIPITOR) 40 MG Tab Take 1 Tablet by mouth every evening. 30 Tablet 2     No facility-administered encounter medications on file as of 6/24/2024.     Review of Systems   Constitutional:  Negative for chills, fever, malaise/fatigue and weight loss.   HENT:  Negative for ear discharge, ear pain, hearing loss and nosebleeds.    Eyes:  Negative for blurred vision, double vision, pain and discharge.   Respiratory:  Negative for cough and shortness of breath.    Cardiovascular:  Negative for chest pain, palpitations, orthopnea, claudication, leg swelling and PND.   Gastrointestinal:  Negative for abdominal pain, blood in stool, melena, nausea and vomiting.   Genitourinary:  Negative for dysuria and hematuria.   Musculoskeletal:  Negative for falls, joint pain and myalgias.   Skin:  Negative for itching and rash.   Neurological:  Negative for dizziness, sensory change, speech change, loss of consciousness and headaches.   Endo/Heme/Allergies:  Negative for environmental allergies. Does not bruise/bleed easily.   Psychiatric/Behavioral:  Negative for depression, hallucinations and suicidal ideas.               Objective     /78 (BP Location: Right arm, Patient Position: Sitting, BP Cuff Size: Adult)   Pulse 80   Resp 14   Ht 1.549 m (5' 1\")   Wt 78.7 kg (173 lb 9.6 oz)   SpO2 97%   BMI 32.80 kg/m²     Physical Exam  Vitals and nursing note reviewed.   Constitutional:       General: She is not in acute distress.     Appearance: She is not diaphoretic.   HENT:      Head: Normocephalic and atraumatic.      Right Ear: External ear normal.      Left Ear: External ear normal.      Nose: No congestion or rhinorrhea.   Eyes:      General:         Right eye: No discharge.         Left eye: No discharge.   Neck:      Thyroid: No thyromegaly.      Vascular: No JVD.   Cardiovascular:      " Rate and Rhythm: Normal rate and regular rhythm.      Pulses: Normal pulses.   Pulmonary:      Effort: No respiratory distress.   Abdominal:      General: There is no distension.      Tenderness: There is no abdominal tenderness.   Musculoskeletal:         General: No swelling or tenderness.      Right lower leg: No edema.      Left lower leg: No edema.   Skin:     General: Skin is warm and dry.   Neurological:      Mental Status: She is alert and oriented to person, place, and time.      Cranial Nerves: Cranial nerve deficit present.      Sensory: Sensory deficit present.   Psychiatric:         Behavior: Behavior normal.                Assessment & Plan     1. Dyslipidemia  EKG      2. Primary hypertension  atorvastatin (LIPITOR) 40 MG Tab    losartan (COZAAR) 50 MG Tab      3. Interatrial cardiac shunt  EC-THANG W/O CONT      4. Varicose veins of both lower extremities with pain  US-EXTREMITY VENOUS LOWER BILAT          Medical Decision Making: Today's Assessment/Status/Plan:   At this time, I do think that it is imperative for us to ensure that she is not at risk for another event it were in fact TIA or stroke.  Thus, I do think that a transesophageal echocardiogram will be useful.  Risks and benefits of transesophageal echocardiogram were explained to patient.  Patient showed good understanding.  Risks including esophageal perforation, death, bleeding, infection were clearly conveyed to patient.  Patient is willing to accept the risks and proceed with procedure.    Blood pressure is well controlled. Continue Losartan 50 mg daily.    Continue Atorvastatin 40 mg daily.    We will also check lower extremities venous duplex to assess the anatomy for her lower extremities venous system.       This visit encounter signifies the visit complexity inherent to evaluation and management associated with medical care services that serve as the continuing focal point for all needed health care services and/or with medical care  services that are part of ongoing care related to this patient's single, serious condition, complex cardiac condition.    Ben Carrero M.D.

## 2024-06-24 NOTE — TELEPHONE ENCOUNTER
Edson Barakat,      Patient was informed you will be reaching out to schedule: EC-THANG W/O CONT[[id number]] . Ordered per TT, Thank you.

## 2024-06-25 NOTE — TELEPHONE ENCOUNTER
Patient is scheduled for a Cardioversion without anesthesia on 08- with Dr. Carrero. Patient has been instructed to check in at 7:30 am for 9:30 procedure. Hold Semglutide 7 days prior. Patient has been advised they will need a  home and with them after since they are sedated. Message sent to authorizations. Sent [employer] message to pt with instructions. No device. FYI sent to To.

## 2024-06-26 NOTE — TELEPHONE ENCOUNTER
Case scheduled with Priscilla. Paperwork emailed to [facility] lab scheduling.

## 2024-07-19 ENCOUNTER — APPOINTMENT (OUTPATIENT)
Dept: ADMISSIONS | Facility: MEDICAL CENTER | Age: 54
End: 2024-07-19
Attending: INTERNAL MEDICINE
Payer: COMMERCIAL

## 2024-07-19 DIAGNOSIS — I10 PRIMARY HYPERTENSION: ICD-10-CM

## 2024-07-19 RX ORDER — ATORVASTATIN CALCIUM 40 MG/1
40 TABLET, FILM COATED ORAL EVERY EVENING
Qty: 100 TABLET | Refills: 4 | OUTPATIENT
Start: 2024-07-19

## 2024-07-19 RX ORDER — LOSARTAN POTASSIUM 50 MG/1
50 TABLET ORAL DAILY
Qty: 100 TABLET | Refills: 1 | Status: SHIPPED | OUTPATIENT
Start: 2024-07-19

## 2024-07-25 ENCOUNTER — PRE-ADMISSION TESTING (OUTPATIENT)
Dept: ADMISSIONS | Facility: MEDICAL CENTER | Age: 54
End: 2024-07-25
Attending: INTERNAL MEDICINE
Payer: COMMERCIAL

## 2024-08-19 ENCOUNTER — APPOINTMENT (OUTPATIENT)
Dept: CARDIOLOGY | Facility: MEDICAL CENTER | Age: 54
End: 2024-08-19
Attending: INTERNAL MEDICINE
Payer: COMMERCIAL

## 2024-08-19 ENCOUNTER — HOSPITAL ENCOUNTER (OUTPATIENT)
Facility: MEDICAL CENTER | Age: 54
End: 2024-08-19
Attending: INTERNAL MEDICINE | Admitting: INTERNAL MEDICINE
Payer: COMMERCIAL

## 2024-08-19 VITALS
RESPIRATION RATE: 16 BRPM | HEART RATE: 75 BPM | OXYGEN SATURATION: 95 % | SYSTOLIC BLOOD PRESSURE: 117 MMHG | BODY MASS INDEX: 33.54 KG/M2 | DIASTOLIC BLOOD PRESSURE: 74 MMHG | WEIGHT: 170.86 LBS | HEIGHT: 60 IN | TEMPERATURE: 97.3 F

## 2024-08-19 DIAGNOSIS — Q24.8 INTERATRIAL CARDIAC SHUNT: ICD-10-CM

## 2024-08-19 PROBLEM — R93.1 ABNORMAL ECHOCARDIOGRAM: Status: ACTIVE | Noted: 2024-08-19

## 2024-08-19 PROCEDURE — 700111 HCHG RX REV CODE 636 W/ 250 OVERRIDE (IP): Mod: JZ | Performed by: INTERNAL MEDICINE

## 2024-08-19 PROCEDURE — 93312 ECHO TRANSESOPHAGEAL: CPT | Mod: 26 | Performed by: INTERNAL MEDICINE

## 2024-08-19 PROCEDURE — 160036 HCHG PACU - EA ADDL 30 MINS PHASE I

## 2024-08-19 PROCEDURE — 160002 HCHG RECOVERY MINUTES (STAT)

## 2024-08-19 PROCEDURE — 160035 HCHG PACU - 1ST 60 MINS PHASE I

## 2024-08-19 PROCEDURE — 93325 DOPPLER ECHO COLOR FLOW MAPG: CPT

## 2024-08-19 RX ORDER — SODIUM CHLORIDE 9 MG/ML
500 INJECTION, SOLUTION INTRAVENOUS
Status: DISCONTINUED | OUTPATIENT
Start: 2024-08-19 | End: 2024-08-19 | Stop reason: HOSPADM

## 2024-08-19 RX ORDER — MIDAZOLAM HYDROCHLORIDE 1 MG/ML
.5-2 INJECTION INTRAMUSCULAR; INTRAVENOUS PRN
Status: DISCONTINUED | OUTPATIENT
Start: 2024-08-19 | End: 2024-08-19 | Stop reason: HOSPADM

## 2024-08-19 RX ADMIN — MIDAZOLAM HYDROCHLORIDE 2 MG: 1 INJECTION, SOLUTION INTRAMUSCULAR; INTRAVENOUS at 09:46

## 2024-08-19 RX ADMIN — FENTANYL CITRATE 50 MCG: 50 INJECTION, SOLUTION INTRAMUSCULAR; INTRAVENOUS at 09:46

## 2024-08-19 RX ADMIN — FENTANYL CITRATE 50 MCG: 50 INJECTION, SOLUTION INTRAMUSCULAR; INTRAVENOUS at 09:42

## 2024-08-19 RX ADMIN — FENTANYL CITRATE 50 MCG: 50 INJECTION, SOLUTION INTRAMUSCULAR; INTRAVENOUS at 09:49

## 2024-08-19 RX ADMIN — MIDAZOLAM HYDROCHLORIDE 2 MG: 1 INJECTION, SOLUTION INTRAMUSCULAR; INTRAVENOUS at 09:49

## 2024-08-19 RX ADMIN — MIDAZOLAM HYDROCHLORIDE 2 MG: 1 INJECTION, SOLUTION INTRAMUSCULAR; INTRAVENOUS at 09:42

## 2024-08-19 ASSESSMENT — FIBROSIS 4 INDEX: FIB4 SCORE: 1.01

## 2024-08-19 NOTE — PROCEDURES
DOS: 8/19/2024    Procedure performed: Transesophageal echocardiogram.    Patient was brought to procedure room 3.    Risks and benefits of transesophageal echocardiogram were explained to patient.  Patient showed good understanding.  Risks including esophageal perforation, death, bleeding, infection were clearly conveyed to patient.  Patient is willing to accept the risks and proceed with procedure.    Moderate sedation was used for sedation process.    Indication: to assess for PFO.    Complication: none    Diagnosis: patent foramen ovale was not able to be visually seen. However, there was small amount of bubbles crossing over from right to left with Valsalva maneuver, suggestive of small PFO. Overall, not a candidate for closure.    Moderate sedation was used by me (Ben Carrero MD).    Agents: Fentanyl (150 mcg) and Versed (4 mg) via intravenous injection (please see media tab for details of dosage).    Start time: 09 : 42 AM.    Stop time: 09 :55 AM.    Complications: none.      Electronically Signed by: Ben Carrero M.D.    8/19/2024  9:54 AM

## 2024-08-19 NOTE — H&P
Cardiology H P Note:    Ben Carrero M.D.  Date & Time note created:    8/19/2024   8:48 AM         Patient ID:   Name:             Yadira Suarez   YOB: 1970  Age:                 54 y.o.  female   MRN:               [id number]                                                             Chief Complaint / Reason for consult:  Stroke    History of Present Illness:    Alexandra Suarez is a 53 y.o. female who presents today for cardiac care and management of hypertension and dyslipidemia.  She did have Bell's palsy earlier this year. She was treated with steroids. There was also concern for TIA vs stroke. She did have TTE which showed right to left shunt with positive bubbles study.  Patient does see a neurologist who was not sure if it was Bell's palsy versus TIA or stroke.     Review of Systems:      Constitutional: Denies fevers, Denies weight changes  Eyes: Denies changes in vision, no eye pain  Ears/Nose/Throat/Mouth: Denies nasal congestion or sore throat   Cardiovascular: no chest pain, no palpitations   Respiratory: no shortness of breath , Denies cough  Gastrointestinal/Hepatic: Denies abdominal pain, nausea, vomiting, diarrhea, constipation or GI bleeding   Genitourinary: Denies dysuria or frequency  Musculoskeletal/Rheum: Denies  joint pain and swelling   Skin: Denies rash  Neurological: Denies headache, confusion, memory loss or focal weakness/parasthesias  Psychiatric: denies mood disorder   Endocrine: Gloria thyroid problems  Heme/Oncology/Lymph Nodes: Denies enlarged lymph nodes, denies brusing or known bleeding disorder  All other systems were reviewed and are negative (AMA/CMS criteria)                Past Medical History:   Past Medical History:   Diagnosis Date    Anesthesia 07/25/2024    nausea, history of vertigo    Hypertension 07/25/2024    medicated    Infectious disease     MRSA/VRE - works in hospital    Other specified symptom associated with female genital  organs     hysterectomy    Pain     Parathyroid disorder (HCC)     PONV (postoperative nausea and vomiting) 07/25/2024    history of vertigo    Snoring 07/25/2024    Stroke (HCC) 07/25/2024 4/2024, no residual issues, left eye is smaller than right     Active Hospital Problems    Diagnosis     Abnormal echocardiogram [R93.1]     TIA (transient ischemic attack) [G45.9]        Past Surgical History:  Past Surgical History:   Procedure Laterality Date    PARATHYROIDECTOMY Right 05/02/2019    ABDOMINOPLASTY  12/11/2013    Performed by Sherrie Kontt M.D. at SURGERY [facility]    FAITH BY LAPAROSCOPY  9/27/2012    Performed by Jana Henriquez M.D. at SURGERY [facility] ORS    PELVISCOPY  9/16/2010    Performed by LALY FALLON at SURGERY [facility] ORS    ABDOMINAL HYSTERECTOMY TOTAL  2006    fibroid removal        Hospital Medications:  No current facility-administered medications for this encounter.    Current Outpatient Medications:  Medications Prior to Admission   Medication Sig Dispense Refill Last Dose    losartan (COZAAR) 50 MG Tab Take 1 Tablet by mouth every day. (Patient taking differently: Take 50 mg by mouth every day.     MEDICATION INSTRUCTIONS FOR SURGERY/PROCEDURE SCHEDULED FOR 8/19/24   OK TO CONTINUE TAKING PRIOR TO SURGERY AND DAY OF SURGERY PER CARDIOLOGIST) 100 Tablet 1 8/19/2024 at 0530    atorvastatin (LIPITOR) 40 MG Tab Take 1 Tablet by mouth every evening. (Patient taking differently: Take 40 mg by mouth every evening.     MEDICATION INSTRUCTIONS FOR SURGERY/PROCEDURE SCHEDULED FOR 8/19/24   CONTINUE TAKING MED PRIOR TO SURGERY) 100 Tablet 4 8/18/2024 at pm    aspirin 81 MG EC tablet Take 81 mg by mouth every day.   MEDICATION INSTRUCTIONS FOR SURGERY/PROCEDURE SCHEDULED FOR 8/19/24   OK TO CONTINUE TAKING PRIOR TO SURGERY AND DAY OF SURGERY PER CARDIOLOGIST   8/19/2024 at 0530    Magnesium Glycinate 100 MG Cap Take 200 mg by mouth every day at 6 PM.     MEDICATION  INSTRUCTIONS FOR SURGERY/PROCEDURE SCHEDULED FOR 8/19/24   CONTINUE TAKING MED PRIOR TO SURGERY   8/18/2024 at pm    Cholecalciferol (VITAMIN D) 2000 UNIT Tab Take 2,000 Units by mouth every day. 4,000 units daily      MEDICATION INSTRUCTIONS FOR SURGERY/PROCEDURE SCHEDULED FOR 8/19/24   OK TO CONTINUE TAKING PRIOR TO SURGERY AND DAY OF SURGERY PER CARDIOLOGIST   8/18/2024 at pm    omeprazole (PRILOSEC) 20 MG delayed-release capsule Take 20 mg by mouth every day.     MEDICATION INSTRUCTIONS FOR SURGERY/PROCEDURE SCHEDULED FOR 8/19/24   OK TO CONTINUE TAKING PRIOR TO SURGERY AND DAY OF SURGERY PER CARDIOLOGIST   8/18/2024 at pm    Multiple Vitamin (MULTIVITAMIN PO) Take 1 Tablet by mouth every day.     MEDICATION INSTRUCTIONS FOR SURGERY/PROCEDURE SCHEDULED FOR 8/19/24   OK TO CONTINUE TAKING PRIOR TO SURGERY AND DAY OF SURGERY PER CARDIOLOGIST   8/18/2024 at pm    gabapentin (NEURONTIN) 100 MG Cap Take 1 Capsule by mouth 3 times a day. (Patient taking differently: Take 100 mg by mouth 3 times a day as needed.     MEDICATION INSTRUCTIONS FOR SURGERY/PROCEDURE SCHEDULED FOR 8/19/24   OK TO CONTINUE TAKING PRIOR TO SURGERY AND DAY OF SURGERY IF NEEDED PER CARDIOLOGIST) 270 Capsule 3  at per patient hasn't take in awhile    Semaglutide (WEGOVY) 0.25 MG/0.5ML Solution Auto-injector Pen-injector Inject 0.5 mL under the skin every 7 days. (Patient not taking: Reported on 6/24/2024) 2 mL 0     OnabotulinumtoxinA (BOTOX INJ) Inject 1 Dose as directed every 6 months. Eye muscle spasms   11/1/2023    calcium carbonate (TUMS EXTRA STRENGTH 750) 750 MG chewable tablet Chew 1 Tablet as needed (Muscle cramps).     MEDICATION INSTRUCTIONS FOR SURGERY/PROCEDURE SCHEDULED FOR 8/19/24   OK TO CONTINUE TAKING PRIOR TO SURGERY AND DAY OF SURGERY IF NEEDED    at per pt it has been about 4 months    alendronate (FOSAMAX) 70 MG Tab Take 1 Tablet by mouth every 7 days. (Patient taking differently: Take 70 mg by mouth every 7 days.  Wednesdays    MEDICATION INSTRUCTIONS FOR SURGERY/PROCEDURE SCHEDULED FOR 8/19/24   OK TO CONTINUE TAKING PRIOR TO SURGERY AND DAY OF SURGERY PER CARDIOLOGIST) 12 Tablet 3 8/14/2024       Medication Allergy:  Allergies   Allergen Reactions    Pork Allergy Rash and Itching     itching       Family History:  Family History   Problem Relation Age of Onset    Diabetes Mother         diet controlled    Hypertension Father     Heart Disease Father         MI age 61    Cancer Father         prostate ca    Cancer Sister         ovarian lesion    Heart Disease Brother         blood clot heart       Social History:  Social History     Socioeconomic History    Marital status:      Spouse name: Federico    Number of children: Not on file    Years of education: Not on file    Highest education level: Bachelor's degree (e.g., BA, AB, BS)   Occupational History    Occupation: ER      Employer: [employer]   Tobacco Use    Smoking status: Never    Smokeless tobacco: Never   Vaping Use    Vaping status: Never Used   Substance and Sexual Activity    Alcohol use: Yes     Comment: glass of wine every six months    Drug use: No    Sexual activity: Yes     Partners: Male     Birth control/protection: Surgical   Other Topics Concern    Not on file   Social History Narrative    Not on file     Social Determinants of Health     Financial Resource Strain: Low Risk  (2/28/2024)    Overall Financial Resource Strain (CARDIA)     Difficulty of Paying Living Expenses: Not very hard   Food Insecurity: No Food Insecurity (2/28/2024)    Hunger Vital Sign     Worried About Running Out of Food in the Last Year: Never true     Ran Out of Food in the Last Year: Never true   Transportation Needs: No Transportation Needs (2/28/2024)    PRAPARE - Transportation     Lack of Transportation (Medical): No     Lack of Transportation (Non-Medical): No   Physical Activity: Insufficiently Active (2/28/2024)    Exercise Vital Sign     Days of Exercise per Week: 3 days      Minutes of Exercise per Session: 30 min   Stress: No Stress Concern Present (2/28/2024)    [employer] - Occupational Stress Questionnaire     Feeling of Stress : Only a little   Social Connections: Socially Integrated (2/28/2024)    Social Connection and Isolation Panel [NHANES]     Frequency of Communication with Friends and Family: More than three times a week     Frequency of Social Gatherings with Friends and Family: Twice a week     Attends Shinto Services: 1 to 4 times per year     Active Member of Clubs or Organizations: Yes     Attends Club or Organization Meetings: 1 to 4 times per year     Marital Status:    Intimate Partner Violence: Not on file   Housing Stability: Low Risk  (2/28/2024)    Housing Stability Vital Sign     Unable to Pay for Housing in the Last Year: No     Number of Places Lived in the Last Year: 1     Unstable Housing in the Last Year: No         Physical Exam:  Vitals/ General Appearance:   Weight/BMI: Body mass index is 33.37 kg/m².  /86   Pulse 70   Temp 36.4 °C (97.5 °F) (Temporal)   Resp 16   Ht 1.524 m (5')   Wt 77.5 kg (170 lb 13.7 oz)   SpO2 98%   Vitals:    08/19/24 0732   BP: 132/86   Pulse: 70   Resp: 16   Temp: 36.4 °C (97.5 °F)   TempSrc: Temporal   SpO2: 98%   Weight: 77.5 kg (170 lb 13.7 oz)   Height: 1.524 m (5')     Oxygen Therapy:  Pulse Oximetry: 98 %, O2 Delivery Device: None - Room Air    Constitutional:   No acute distress  HENMT:  Normocephalic, Atraumatic.  Eyes:  EOMI, No discharge.  Neck:  no JVD.  Cardiovascular:  Normal heart rate, Normal rhythm.  Extremitites with intact distal pulses, no cyanosis, or edema.  Lungs:  No respiratory distress.  Abdomen: Soft, No tenderness, No guarding, No rebound.  Skin: No significant rash.  Neurologic: Alert & oriented x 3, No focal deficits noted, cranial nerves II through X are intact.  Psychiatric: Affect normal, Judgment normal, Mood normal.      MDM (Data Review):      Records reviewed and summarized in current documentation    Lab Data Review:  No results found for this or any previous visit (from the past 24 hour(s)).    Imaging/Procedures Review:    Chest Xray:  Reviewed    EKG:   As in HPI.     MDM (Assessment and Plan):     Active Hospital Problems    Diagnosis     Abnormal echocardiogram [R93.1]     TIA (transient ischemic attack) [G45.9]          Risks and benefits of transesophageal echocardiogram were explained to patient.  Patient showed good understanding.  Risks including esophageal perforation, death, bleeding, infection were clearly conveyed to patient.  Patient is willing to accept the risks and proceed with procedure.    Ben Carrero MD.   Cardiology Inpatient Service.  [facility].  [phone].  [place], [place].

## 2024-08-19 NOTE — DISCHARGE INSTRUCTIONS
HOME CARE INSTRUCTIONS    ACTIVITY: Rest and take it easy for the first 24 hours.  A responsible adult is recommended to remain with you during that time.  It is normal to feel sleepy.  We encourage you to not do anything that requires balance, judgment or coordination.    FOR 24 HOURS DO NOT:  Drive, operate machinery or run household appliances.  Drink beer or alcoholic beverages.  Make important decisions or sign legal documents.    SPECIAL INSTRUCTIONS: Transesophageal Echocardiogram  Transesophageal echocardiogram (THANG) is a test that uses sound waves to take pictures of your heart. THANG is done by passing a small probe attached to a flexible tube down the part of the body that moves food from your mouth to your stomach (esophagus). The pictures give clear images of your heart. This can help your doctor see if there are problems with your heart.  Tell a doctor about:  Any allergies you have.  All medicines you are taking. This includes vitamins, herbs, eye drops, creams, and over-the-counter medicines.  Any problems you or family members have had with anesthetic medicines.  Any blood disorders you have.  Any surgeries you have had.  Any medical conditions you have.  Any swallowing problems.  Whether you have or have had a blockage in the part of the body that moves food from your mouth to your stomach.  Whether you are pregnant or may be pregnant.  What are the risks?  In general, this is a safe procedure. But, problems may occur, such as:  Damage to nearby structures or organs.  A tear in the part of the body that moves food from your mouth to your stomach.  Irregular heartbeat.  Hoarse voice or trouble swallowing.  Bleeding.  What happens before the procedure?  Medicines  Ask your doctor about changing or stopping:  Your normal medicines.  Vitamins, herbs, and supplements.  Over-the-counter medicines.  Do not take aspirin or ibuprofen unless you are told to.  General instructions  Follow instructions from your  doctor about what you cannot eat or drink.  You will take out any dentures or dental retainers.  Plan to have a responsible adult take you home from the hospital or clinic.  Plan to have a responsible adult care for you for the time you are told after you leave the hospital or clinic. This is important.  What happens during the procedure?  An IV will be put into one of your veins.  You may be given:  A sedative. This medicine helps you relax.  A medicine to numb the back of your throat. This may be sprayed or gargled.  Your blood pressure, heart rate, and breathing will be watched.  You may be asked to lie on your left side.  A bite block will be placed in your mouth. This keeps you from biting the tube.  The tip of the probe will be placed into the back of your mouth.  You will be asked to swallow.  Your doctor will take pictures of your heart.  The probe and bite block will be taken out after the test is done.  The procedure may vary among doctors and hospitals.  What can I expect after the procedure?  You will be monitored until you leave the hospital or clinic. This includes checking your blood pressure, heart rate, breathing rate, and blood oxygen level.  Your throat may feel sore and numb. This will get better over time. You will not be allowed to eat or drink until the numbness has gone away.  It is common to have a sore throat for a day or two.  It is up to you to get the results of your procedure. Ask how to get your results when they are ready.  Follow these instructions at home:  If you were given a sedative during your procedure, do not drive or use machines until your doctor says that it is safe.  Return to your normal activities when your doctor says that it is safe.  Keep all follow-up visits.  Summary  THANG is a test that uses sound waves to take pictures of your heart.  You will be given a medicine to help you relax.  Do not drive or use machines until your doctor says that it is safe.  This  information is not intended to replace advice given to you by your health care provider. Make sure you discuss any questions you have with your health care provider.    DIET: To avoid nausea, slowly advance diet as tolerated, avoiding spicy or greasy foods for the first day.  Add more substantial food to your diet according to your physician's instructions.  Babies can be fed formula or breast milk as soon as they are hungry.  INCREASE FLUIDS AND FIBER TO AVOID CONSTIPATION.    SURGICAL DRESSING/BATHING: You may return to your normal bathing routine.    MEDICATIONS: Resume taking daily medication.  Take prescribed pain medication with food.  If no medication is prescribed, you may take non-aspirin pain medication if needed.  PAIN MEDICATION CAN BE VERY CONSTIPATING.  Take a stool softener or laxative such as senokot, pericolace, or milk of magnesia if needed.    A follow-up appointment should be arranged with your doctor in 1-2 Weeks; call to schedule.    You should CALL YOUR PHYSICIAN if you develop:  Fever greater than 101 degrees F.  Pain not relieved by medication, or persistent nausea or vomiting.  Excessive bleeding (blood soaking through dressing) or unexpected drainage from the wound.  Extreme redness or swelling around the incision site, drainage of pus or foul smelling drainage.  Inability to urinate or empty your bladder within 8 hours.  Problems with breathing or chest pain.    You should call 911 if you develop problems with breathing or chest pain.  If you are unable to contact your doctor or surgical center, you should go to the nearest emergency room or urgent care center.      Physician's telephone #: [phone]  To    MILD FLU-LIKE SYMPTOMS ARE NORMAL.  YOU MAY EXPERIENCE GENERALIZED MUSCLE ACHES, THROAT IRRITATION, HEADACHE AND/OR SOME NAUSEA.    If any questions arise, call your doctor.  If your doctor is not available, please feel free to call the Surgical Center at [phone].  The  Center is open Monday through Friday from 7AM to 7PM.      A registered nurse may call you a few days after your surgery to see how you are doing after your procedure.    You may also receive a survey in the mail within the next two weeks and we ask that you take a few moments to complete the survey and return it to us.  Our goal is to provide you with very good care and we value your comments.     Depression / Suicide Risk    As you are discharged from this [facility] facility, it is important to learn how to keep safe from harming yourself.    Recognize the warning signs:  Abrupt changes in personality, positive or negative- including increase in energy   Giving away possessions  Change in eating patterns- significant weight changes-  positive or negative  Change in sleeping patterns- unable to sleep or sleeping all the time   Unwillingness or inability to communicate  Depression  Unusual sadness, discouragement and loneliness  Talk of wanting to die  Neglect of personal appearance   Rebelliousness- reckless behavior  Withdrawal from people/activities they love  Confusion- inability to concentrate     If you or a loved one observes any of these behaviors or has concerns about self-harm, here's what you can do:  Talk about it- your feelings and reasons for harming yourself  Remove any means that you might use to hurt yourself (examples: pills, rope, extension cords, firearm)  Get professional help from the community (Mental Health, Substance Abuse, psychological counseling)  Do not be alone:Call your Safe Contact- someone whom you trust who will be there for you.  Call your local CRISIS HOTLINE 398-6148 or 906-398-7998  Call your local Children's Mobile Crisis Response Team [place] (314) 752-4040 or www.[employer]  Call the toll free National Suicide Prevention Hotlines   National Suicide Prevention Lifeline 869-555-ALIJ (9835)  National Hope Line Network 800-SUICIDE (205-6621)    I acknowledge  receipt and understanding of these Home Care instructions.

## 2024-09-20 ENCOUNTER — HOSPITAL ENCOUNTER (OUTPATIENT)
Dept: RADIOLOGY | Facility: MEDICAL CENTER | Age: 54
End: 2024-09-20
Attending: INTERNAL MEDICINE
Payer: COMMERCIAL

## 2024-09-20 DIAGNOSIS — I83.813 VARICOSE VEINS OF BOTH LOWER EXTREMITIES WITH PAIN: ICD-10-CM

## 2024-09-20 PROCEDURE — 93970 EXTREMITY STUDY: CPT

## 2024-09-23 PROCEDURE — 93970 EXTREMITY STUDY: CPT | Mod: 26 | Performed by: INTERNAL MEDICINE

## 2024-09-23 NOTE — RESULT ENCOUNTER NOTE
Dear team,    Can you please let Yadira Suarez know that result is ok and I will see patient as scheduled?    Thank you,  Satinder.

## 2024-09-23 NOTE — PROGRESS NOTES
Chief Complaint   Patient presents with    Hypertension    Dyslipidemia          Subjective:   Yadira Suarez is a 54 y.o. female who presents today for follow-up.     Patient of Dr. Carrero.  Current medical problems include hypertension, dyslipidemia. Earlier this year she was hospitalized for transient neurological symptoms and during a work up she had a positive bubble study on TTE. She was referred for a transesophageal echocardiogram which showed small amount of bubbles crossing from right to left with Valsalva maneuver but no patent escalante ovale was visually seen. Findings consistent with small PFO and overall not a candidate for closure.    She is here for follow up. Her neurologist has diagnosed her with blepharospasm and bell's palsy with possible R MCA aneurysm- under surveillance for this. She is also on statin, aspirin.   She runs daily without exertional symptoms.     Her family history consists of father and brother with early CAD (requiring CABG).      Past Medical History:   Diagnosis Date    Anesthesia 07/25/2024    nausea, history of vertigo    Hypertension 07/25/2024    medicated    Infectious disease     MRSA/VRE - works in hospital    Other specified symptom associated with female genital organs     hysterectomy    Pain     Parathyroid disorder (HCC)     PONV (postoperative nausea and vomiting) 07/25/2024    history of vertigo    Snoring 07/25/2024    Stroke (HCC) 07/25/2024 4/2024, no residual issues, left eye is smaller than right         Family History   Problem Relation Age of Onset    Diabetes Mother         diet controlled    Hypertension Father     Heart Disease Father         MI age 61    Cancer Father         prostate ca    Cancer Sister         ovarian lesion    Heart Disease Brother         blood clot heart         Social History     Tobacco Use    Smoking status: Never    Smokeless tobacco: Never   Vaping Use    Vaping status: Never Used   Substance Use Topics    Alcohol use:  Yes     Comment: OCC (Holidays)    Drug use: No         Allergies   Allergen Reactions    Pork Allergy Rash and Itching     itching         Current Outpatient Medications   Medication Sig    losartan (COZAAR) 50 MG Tab Take 1 Tablet by mouth every day. (Patient taking differently: Take 50 mg by mouth every day.     MEDICATION INSTRUCTIONS FOR SURGERY/PROCEDURE SCHEDULED FOR 8/19/24   OK TO CONTINUE TAKING PRIOR TO SURGERY AND DAY OF SURGERY PER CARDIOLOGIST)    atorvastatin (LIPITOR) 40 MG Tab Take 1 Tablet by mouth every evening. (Patient taking differently: Take 40 mg by mouth every evening.     MEDICATION INSTRUCTIONS FOR SURGERY/PROCEDURE SCHEDULED FOR 8/19/24   CONTINUE TAKING MED PRIOR TO SURGERY)    gabapentin (NEURONTIN) 100 MG Cap Take 1 Capsule by mouth 3 times a day. (Patient taking differently: Take 100 mg by mouth 3 times a day as needed.     MEDICATION INSTRUCTIONS FOR SURGERY/PROCEDURE SCHEDULED FOR 8/19/24   OK TO CONTINUE TAKING PRIOR TO SURGERY AND DAY OF SURGERY IF NEEDED PER CARDIOLOGIST)    aspirin 81 MG EC tablet Take 81 mg by mouth every day.   MEDICATION INSTRUCTIONS FOR SURGERY/PROCEDURE SCHEDULED FOR 8/19/24   OK TO CONTINUE TAKING PRIOR TO SURGERY AND DAY OF SURGERY PER CARDIOLOGIST    OnabotulinumtoxinA (BOTOX INJ) Inject 1 Dose as directed every 6 months. Eye muscle spasms    alendronate (FOSAMAX) 70 MG Tab Take 1 Tablet by mouth every 7 days. (Patient taking differently: Take 70 mg by mouth every 7 days. Wednesdays    MEDICATION INSTRUCTIONS FOR SURGERY/PROCEDURE SCHEDULED FOR 8/19/24   OK TO CONTINUE TAKING PRIOR TO SURGERY AND DAY OF SURGERY PER CARDIOLOGIST)    Magnesium Glycinate 100 MG Cap Take 200 mg by mouth every day at 6 PM.     MEDICATION INSTRUCTIONS FOR SURGERY/PROCEDURE SCHEDULED FOR 8/19/24   CONTINUE TAKING MED PRIOR TO SURGERY    Cholecalciferol (VITAMIN D) 2000 UNIT Tab Take 2,000 Units by mouth every day. 4,000 units daily      MEDICATION INSTRUCTIONS FOR  "SURGERY/PROCEDURE SCHEDULED FOR 8/19/24   OK TO CONTINUE TAKING PRIOR TO SURGERY AND DAY OF SURGERY PER CARDIOLOGIST    omeprazole (PRILOSEC) 20 MG delayed-release capsule Take 20 mg by mouth every day.     MEDICATION INSTRUCTIONS FOR SURGERY/PROCEDURE SCHEDULED FOR 8/19/24   OK TO CONTINUE TAKING PRIOR TO SURGERY AND DAY OF SURGERY PER CARDIOLOGIST    Multiple Vitamin (MULTIVITAMIN PO) Take 1 Tablet by mouth every day.     MEDICATION INSTRUCTIONS FOR SURGERY/PROCEDURE SCHEDULED FOR 8/19/24   OK TO CONTINUE TAKING PRIOR TO SURGERY AND DAY OF SURGERY PER CARDIOLOGIST    Semaglutide (WEGOVY) 0.25 MG/0.5ML Solution Auto-injector Pen-injector Inject 0.5 mL under the skin every 7 days. (Patient not taking: Reported on 6/24/2024)         Review of Systems   Respiratory:  Negative for cough and shortness of breath.    Cardiovascular:  Negative for chest pain, palpitations, orthopnea, leg swelling and PND.   Neurological:  Negative for dizziness and loss of consciousness.          Objective:   /74 (BP Location: Left arm, Patient Position: Sitting, BP Cuff Size: Adult)   Pulse 67   Resp 12   Ht 1.549 m (5' 1\")   Wt 79.4 kg (175 lb)   SpO2 98%  Body mass index is 33.07 kg/m².         Physical Exam  Vitals reviewed.   HENT:      Head: Normocephalic and atraumatic.   Cardiovascular:      Rate and Rhythm: Normal rate and regular rhythm.      Heart sounds: No murmur heard.  Pulmonary:      Effort: Pulmonary effort is normal. No respiratory distress.      Breath sounds: No rales.   Musculoskeletal:      Right lower leg: No edema.      Left lower leg: No edema.   Skin:     General: Skin is warm and dry.   Neurological:      Mental Status: She is alert.      Gait: Gait normal.   Psychiatric:         Judgment: Judgment normal.            Assessment:     1. Interatrial cardiac shunt        2. Primary hypertension             Medical Decision Making:  Today's Assessment / Plan:   THANG also showed right to left bubbles with " valsalva but unable to visualize PFO, therefore small and not a candidate for closure. This did not contribute to her neurologic presentation earlier this year.  Her lipid profile is well controlled on atorvastatin 40mg. Given her family history of premature CAD recommend continuing statin and aspirin. Continue her exercise routine.    Return in about 1 year (around 9/24/2025) for Dr To or me.  Sooner if problems.

## 2024-09-24 ENCOUNTER — OFFICE VISIT (OUTPATIENT)
Dept: CARDIOLOGY | Facility: MEDICAL CENTER | Age: 54
End: 2024-09-24
Attending: PHYSICIAN ASSISTANT
Payer: COMMERCIAL

## 2024-09-24 ENCOUNTER — APPOINTMENT (OUTPATIENT)
Dept: NEUROLOGY | Facility: MEDICAL CENTER | Age: 54
End: 2024-09-24
Attending: PHYSICIAN ASSISTANT
Payer: COMMERCIAL

## 2024-09-24 VITALS
HEIGHT: 61 IN | WEIGHT: 175 LBS | RESPIRATION RATE: 12 BRPM | DIASTOLIC BLOOD PRESSURE: 74 MMHG | SYSTOLIC BLOOD PRESSURE: 102 MMHG | HEART RATE: 67 BPM | BODY MASS INDEX: 33.04 KG/M2 | OXYGEN SATURATION: 98 %

## 2024-09-24 DIAGNOSIS — I10 PRIMARY HYPERTENSION: ICD-10-CM

## 2024-09-24 DIAGNOSIS — Q24.8 INTERATRIAL CARDIAC SHUNT: ICD-10-CM

## 2024-09-24 PROCEDURE — 3078F DIAST BP <80 MM HG: CPT | Performed by: PHYSICIAN ASSISTANT

## 2024-09-24 PROCEDURE — 99213 OFFICE O/P EST LOW 20 MIN: CPT | Performed by: PHYSICIAN ASSISTANT

## 2024-09-24 PROCEDURE — 3074F SYST BP LT 130 MM HG: CPT | Performed by: PHYSICIAN ASSISTANT

## 2024-09-24 SDOH — HEALTH STABILITY: PHYSICAL HEALTH: ON AVERAGE, HOW MANY MINUTES DO YOU ENGAGE IN EXERCISE AT THIS LEVEL?: 30 MIN

## 2024-09-24 SDOH — ECONOMIC STABILITY: INCOME INSECURITY: IN THE LAST 12 MONTHS, WAS THERE A TIME WHEN YOU WERE NOT ABLE TO PAY THE MORTGAGE OR RENT ON TIME?: NO

## 2024-09-24 SDOH — ECONOMIC STABILITY: FOOD INSECURITY: WITHIN THE PAST 12 MONTHS, THE FOOD YOU BOUGHT JUST DIDN'T LAST AND YOU DIDN'T HAVE MONEY TO GET MORE.: NEVER TRUE

## 2024-09-24 SDOH — ECONOMIC STABILITY: FOOD INSECURITY: WITHIN THE PAST 12 MONTHS, YOU WORRIED THAT YOUR FOOD WOULD RUN OUT BEFORE YOU GOT MONEY TO BUY MORE.: NEVER TRUE

## 2024-09-24 SDOH — ECONOMIC STABILITY: INCOME INSECURITY: HOW HARD IS IT FOR YOU TO PAY FOR THE VERY BASICS LIKE FOOD, HOUSING, MEDICAL CARE, AND HEATING?: NOT HARD AT ALL

## 2024-09-24 SDOH — HEALTH STABILITY: PHYSICAL HEALTH: ON AVERAGE, HOW MANY DAYS PER WEEK DO YOU ENGAGE IN MODERATE TO STRENUOUS EXERCISE (LIKE A BRISK WALK)?: 4 DAYS

## 2024-09-24 ASSESSMENT — SOCIAL DETERMINANTS OF HEALTH (SDOH)
HOW OFTEN DO YOU GET TOGETHER WITH FRIENDS OR RELATIVES?: ONCE A WEEK
DO YOU BELONG TO ANY CLUBS OR ORGANIZATIONS SUCH AS CHURCH GROUPS UNIONS, FRATERNAL OR ATHLETIC GROUPS, OR SCHOOL GROUPS?: YES
IN THE PAST 12 MONTHS, HAS THE ELECTRIC, GAS, OIL, OR WATER COMPANY THREATENED TO SHUT OFF SERVICE IN YOUR HOME?: NO
HOW OFTEN DO YOU GET TOGETHER WITH FRIENDS OR RELATIVES?: ONCE A WEEK
HOW MANY DRINKS CONTAINING ALCOHOL DO YOU HAVE ON A TYPICAL DAY WHEN YOU ARE DRINKING: 1 OR 2
HOW OFTEN DO YOU ATTEND CHURCH OR RELIGIOUS SERVICES?: MORE THAN 4 TIMES PER YEAR
IN A TYPICAL WEEK, HOW MANY TIMES DO YOU TALK ON THE PHONE WITH FAMILY, FRIENDS, OR NEIGHBORS?: ONCE A WEEK
HOW HARD IS IT FOR YOU TO PAY FOR THE VERY BASICS LIKE FOOD, HOUSING, MEDICAL CARE, AND HEATING?: NOT HARD AT ALL
HOW OFTEN DO YOU ATTEND CHURCH OR RELIGIOUS SERVICES?: MORE THAN 4 TIMES PER YEAR
HOW OFTEN DO YOU HAVE A DRINK CONTAINING ALCOHOL: MONTHLY OR LESS
DO YOU BELONG TO ANY CLUBS OR ORGANIZATIONS SUCH AS CHURCH GROUPS UNIONS, FRATERNAL OR ATHLETIC GROUPS, OR SCHOOL GROUPS?: YES
HOW OFTEN DO YOU ATTENT MEETINGS OF THE CLUB OR ORGANIZATION YOU BELONG TO?: MORE THAN 4 TIMES PER YEAR
IN A TYPICAL WEEK, HOW MANY TIMES DO YOU TALK ON THE PHONE WITH FAMILY, FRIENDS, OR NEIGHBORS?: ONCE A WEEK
HOW OFTEN DO YOU HAVE SIX OR MORE DRINKS ON ONE OCCASION: NEVER
WITHIN THE PAST 12 MONTHS, YOU WORRIED THAT YOUR FOOD WOULD RUN OUT BEFORE YOU GOT THE MONEY TO BUY MORE: NEVER TRUE
HOW OFTEN DO YOU ATTENT MEETINGS OF THE CLUB OR ORGANIZATION YOU BELONG TO?: MORE THAN 4 TIMES PER YEAR

## 2024-09-24 ASSESSMENT — ENCOUNTER SYMPTOMS
PND: 0
DIZZINESS: 0
ORTHOPNEA: 0
LOSS OF CONSCIOUSNESS: 0
PALPITATIONS: 0
COUGH: 0
SHORTNESS OF BREATH: 0

## 2024-09-24 ASSESSMENT — LIFESTYLE VARIABLES
HOW OFTEN DO YOU HAVE A DRINK CONTAINING ALCOHOL: MONTHLY OR LESS
HOW MANY STANDARD DRINKS CONTAINING ALCOHOL DO YOU HAVE ON A TYPICAL DAY: 1 OR 2
HOW OFTEN DO YOU HAVE SIX OR MORE DRINKS ON ONE OCCASION: NEVER
AUDIT-C TOTAL SCORE: 1
SKIP TO QUESTIONS 9-10: 1

## 2024-09-24 ASSESSMENT — FIBROSIS 4 INDEX: FIB4 SCORE: 1.01

## 2024-09-27 ENCOUNTER — APPOINTMENT (OUTPATIENT)
Dept: MEDICAL GROUP | Facility: LAB | Age: 54
End: 2024-09-27
Payer: COMMERCIAL

## 2024-09-27 VITALS
HEART RATE: 68 BPM | HEIGHT: 61 IN | OXYGEN SATURATION: 95 % | WEIGHT: 177.03 LBS | DIASTOLIC BLOOD PRESSURE: 70 MMHG | TEMPERATURE: 96.6 F | SYSTOLIC BLOOD PRESSURE: 126 MMHG | RESPIRATION RATE: 14 BRPM | BODY MASS INDEX: 33.42 KG/M2

## 2024-09-27 DIAGNOSIS — Z12.31 ENCOUNTER FOR SCREENING MAMMOGRAM FOR MALIGNANT NEOPLASM OF BREAST: ICD-10-CM

## 2024-09-27 DIAGNOSIS — Z23 NEED FOR VACCINATION: ICD-10-CM

## 2024-09-27 DIAGNOSIS — Z00.00 ANNUAL PHYSICAL EXAM: ICD-10-CM

## 2024-09-27 DIAGNOSIS — Z12.11 COLON CANCER SCREENING: ICD-10-CM

## 2024-09-27 PROCEDURE — 90471 IMMUNIZATION ADMIN: CPT | Performed by: STUDENT IN AN ORGANIZED HEALTH CARE EDUCATION/TRAINING PROGRAM

## 2024-09-27 PROCEDURE — 90677 PCV20 VACCINE IM: CPT | Performed by: STUDENT IN AN ORGANIZED HEALTH CARE EDUCATION/TRAINING PROGRAM

## 2024-09-27 PROCEDURE — 99396 PREV VISIT EST AGE 40-64: CPT | Mod: 25 | Performed by: STUDENT IN AN ORGANIZED HEALTH CARE EDUCATION/TRAINING PROGRAM

## 2024-09-27 PROCEDURE — 3074F SYST BP LT 130 MM HG: CPT | Performed by: STUDENT IN AN ORGANIZED HEALTH CARE EDUCATION/TRAINING PROGRAM

## 2024-09-27 PROCEDURE — 3078F DIAST BP <80 MM HG: CPT | Performed by: STUDENT IN AN ORGANIZED HEALTH CARE EDUCATION/TRAINING PROGRAM

## 2024-09-27 ASSESSMENT — FIBROSIS 4 INDEX: FIB4 SCORE: 1.01

## 2024-09-27 ASSESSMENT — ENCOUNTER SYMPTOMS
SHORTNESS OF BREATH: 0
FEVER: 0
CHILLS: 0

## 2024-09-27 NOTE — PROGRESS NOTES
Subjective:     CC:   Chief Complaint   Patient presents with    Annual Exam       HPI:   Yadira Suarez is a 54 y.o. female who presents for annual exam    Patient has GYN provider: No   Last Pap Smear: roughly 2006  H/O Abnormal Pap: Yes, but now has hysterectomy   Last Mammogram: 2024  Last Bone Density Test: 2023  Last Colorectal Cancer Screening: referral already placed  Last Tdap: 2021  Received HPV series: No    Exercise: walks 2.5 miles 3x/week   Diet: trying to consume more protein     No LMP recorded. Patient has had a hysterectomy.  She has utilized hormone replacement therapy in the past  Denies any menopausal symptoms.  No significant bloating/fluid retention, pelvic pain, or dyspareunia. No abnormal vaginal discharge.   No breast tenderness, mass, nipple discharge or changes in size or contour.    OB History   No obstetric history on file.      She  reports being sexually active and has had partner(s) who are male. She reports using the following method of birth control/protection: Surgical.    She  has a past medical history of Anesthesia (07/25/2024), Hypertension (07/25/2024), Infectious disease, Other specified symptom associated with female genital organs, Pain, Parathyroid disorder (HCC), PONV (postoperative nausea and vomiting) (07/25/2024), Snoring (07/25/2024), and Stroke (HCC) (07/25/2024).    She has no past medical history of Breast cancer (HCC).  She  has a past surgical history that includes abdominal hysterectomy total (2006); pelviscopy (9/16/2010); dee by laparoscopy (9/27/2012); abdominoplasty (12/11/2013); and parathyroidectomy (Right, 05/02/2019).    Family History   Problem Relation Age of Onset    Diabetes Mother         diet controlled    Hypertension Father     Heart Disease Father         MI age 61    Cancer Father         prostate ca    Cancer Sister         ovarian lesion    Heart Disease Brother         blood clot heart     Social History     Tobacco Use    Smoking  status: Never    Smokeless tobacco: Never   Vaping Use    Vaping status: Never Used   Substance Use Topics    Alcohol use: Yes     Comment: OCC (Holidays)    Drug use: No       Patient Active Problem List    Diagnosis Date Noted    Abnormal echocardiogram 08/19/2024    Hospital discharge follow-up 02/27/2024    Brain aneurysm 02/27/2024    Nonrheumatic aortic valve insufficiency 02/26/2024    Hyperglycemia 02/25/2024    Bell's palsy 02/25/2024    GERD (gastroesophageal reflux disease) 02/25/2024    Interatrial cardiac shunt 02/25/2024    Chest pain 02/24/2024    TIA (transient ischemic attack) 02/21/2024    Low back pain with right-sided sciatica 02/21/2024    Obesity (BMI 30-39.9) 06/20/2023    Lymphadenopathy 04/12/2023    Age-related osteoporosis without current pathological fracture 01/17/2023    Myokymia of left side of face 01/17/2023    S/P parathyroidectomy 04/29/2022    Hyperparathyroidism (HCC) 01/23/2019    Hemifacial spasm 10/09/2018    Dyslipidemia 12/28/2014    Preventative health care 10/24/2013    S/P laparoscopic cholecystectomy 09/25/2012    Essential hypertension 04/21/2011    S/P ERICA (total abdominal hysterectomy) 01/14/2011    Vitamin D deficiency 01/14/2011    History of vertigo 01/14/2011    Microscopic hematuria 01/14/2011     Current Outpatient Medications   Medication Sig Dispense Refill    losartan (COZAAR) 50 MG Tab Take 1 Tablet by mouth every day. (Patient taking differently: Take 50 mg by mouth every day.     MEDICATION INSTRUCTIONS FOR SURGERY/PROCEDURE SCHEDULED FOR 8/19/24   OK TO CONTINUE TAKING PRIOR TO SURGERY AND DAY OF SURGERY PER CARDIOLOGIST) 100 Tablet 1    atorvastatin (LIPITOR) 40 MG Tab Take 1 Tablet by mouth every evening. (Patient taking differently: Take 40 mg by mouth every evening.     MEDICATION INSTRUCTIONS FOR SURGERY/PROCEDURE SCHEDULED FOR 8/19/24   CONTINUE TAKING MED PRIOR TO SURGERY) 100 Tablet 4    gabapentin (NEURONTIN) 100 MG Cap Take 1 Capsule by mouth 3  times a day. (Patient taking differently: Take 100 mg by mouth 3 times a day as needed.     MEDICATION INSTRUCTIONS FOR SURGERY/PROCEDURE SCHEDULED FOR 8/19/24   OK TO CONTINUE TAKING PRIOR TO SURGERY AND DAY OF SURGERY IF NEEDED PER CARDIOLOGIST) 270 Capsule 3    aspirin 81 MG EC tablet Take 81 mg by mouth every day.   MEDICATION INSTRUCTIONS FOR SURGERY/PROCEDURE SCHEDULED FOR 8/19/24   OK TO CONTINUE TAKING PRIOR TO SURGERY AND DAY OF SURGERY PER CARDIOLOGIST      OnabotulinumtoxinA (BOTOX INJ) Inject 1 Dose as directed every 6 months. Eye muscle spasms      alendronate (FOSAMAX) 70 MG Tab Take 1 Tablet by mouth every 7 days. (Patient taking differently: Take 70 mg by mouth every 7 days. Wednesdays    MEDICATION INSTRUCTIONS FOR SURGERY/PROCEDURE SCHEDULED FOR 8/19/24   OK TO CONTINUE TAKING PRIOR TO SURGERY AND DAY OF SURGERY PER CARDIOLOGIST) 12 Tablet 3    Magnesium Glycinate 100 MG Cap Take 200 mg by mouth every day at 6 PM.     MEDICATION INSTRUCTIONS FOR SURGERY/PROCEDURE SCHEDULED FOR 8/19/24   CONTINUE TAKING MED PRIOR TO SURGERY      Cholecalciferol (VITAMIN D) 2000 UNIT Tab Take 2,000 Units by mouth every day. 4,000 units daily      MEDICATION INSTRUCTIONS FOR SURGERY/PROCEDURE SCHEDULED FOR 8/19/24   OK TO CONTINUE TAKING PRIOR TO SURGERY AND DAY OF SURGERY PER CARDIOLOGIST      omeprazole (PRILOSEC) 20 MG delayed-release capsule Take 20 mg by mouth every day.     MEDICATION INSTRUCTIONS FOR SURGERY/PROCEDURE SCHEDULED FOR 8/19/24   OK TO CONTINUE TAKING PRIOR TO SURGERY AND DAY OF SURGERY PER CARDIOLOGIST      Multiple Vitamin (MULTIVITAMIN PO) Take 1 Tablet by mouth every day.     MEDICATION INSTRUCTIONS FOR SURGERY/PROCEDURE SCHEDULED FOR 8/19/24   OK TO CONTINUE TAKING PRIOR TO SURGERY AND DAY OF SURGERY PER CARDIOLOGIST       No current facility-administered medications for this visit.     Allergies   Allergen Reactions    Pork Allergy Rash and Itching     itching       Review of Systems   Review  "of Systems   Constitutional:  Negative for chills and fever.   Respiratory:  Negative for shortness of breath.    Cardiovascular:  Negative for chest pain.         Objective:   /70 (BP Location: Right arm, Patient Position: Sitting, BP Cuff Size: Adult)   Pulse 68   Temp 35.9 °C (96.6 °F) (Temporal)   Resp 14   Ht 1.549 m (5' 1\")   Wt 80.3 kg (177 lb 0.5 oz)   SpO2 95%   BMI 33.45 kg/m²     Wt Readings from Last 4 Encounters:   09/27/24 80.3 kg (177 lb 0.5 oz)   09/24/24 79.4 kg (175 lb)   08/19/24 77.5 kg (170 lb 13.7 oz)   06/24/24 78.7 kg (173 lb 9.6 oz)           Physical Exam:  Physical Exam  Constitutional:       General: She is not in acute distress.     Appearance: She is not ill-appearing.   Pulmonary:      Effort: Pulmonary effort is normal.   Neurological:      Mental Status: She is alert.   Psychiatric:         Mood and Affect: Mood normal.         Behavior: Behavior normal.         Thought Content: Thought content normal.         Judgment: Judgment normal.           Assessment and Plan:     1. Encounter for screening mammogram for malignant neoplasm of breast  - MA-SCREENING MAMMO BILAT W/TOMOSYNTHESIS W/CAD; Future    2. Annual physical exam    3. Colon cancer screening  - Referral to GI for Colonoscopy    4. Need for vaccination  - Pneumococcal Conjugate Vaccine 20-Valent (6 wks+)      Health maintenance:    Labs per orders  Immunizations per orders  Age-appropriate guidance discussed including diet and exercise  Discussed  colorectal cancer screening     Follow-up: 1 year annual   "

## 2024-10-11 ENCOUNTER — PATIENT MESSAGE (OUTPATIENT)
Dept: MEDICAL GROUP | Facility: LAB | Age: 54
End: 2024-10-11
Payer: COMMERCIAL

## 2024-10-24 DIAGNOSIS — M81.0 AGE-RELATED OSTEOPOROSIS WITHOUT CURRENT PATHOLOGICAL FRACTURE: ICD-10-CM

## 2024-10-24 RX ORDER — ALENDRONATE SODIUM 70 MG/1
70 TABLET ORAL
Qty: 12 TABLET | Refills: 3 | Status: SHIPPED | OUTPATIENT
Start: 2024-10-24

## 2024-10-31 DIAGNOSIS — I10 PRIMARY HYPERTENSION: ICD-10-CM

## 2024-11-01 RX ORDER — LOSARTAN POTASSIUM 50 MG/1
50 TABLET ORAL DAILY
Qty: 100 TABLET | Refills: 1 | OUTPATIENT
Start: 2024-11-01

## 2024-11-01 RX ORDER — ATORVASTATIN CALCIUM 40 MG/1
40 TABLET, FILM COATED ORAL EVERY EVENING
Qty: 100 TABLET | Refills: 4 | OUTPATIENT
Start: 2024-11-01

## 2024-11-09 DIAGNOSIS — I10 PRIMARY HYPERTENSION: ICD-10-CM

## 2024-11-11 RX ORDER — ATORVASTATIN CALCIUM 40 MG/1
40 TABLET, FILM COATED ORAL EVERY EVENING
Qty: 100 TABLET | Refills: 4 | OUTPATIENT
Start: 2024-11-11

## 2024-11-11 RX ORDER — LOSARTAN POTASSIUM 50 MG/1
50 TABLET ORAL DAILY
Qty: 100 TABLET | Refills: 1 | OUTPATIENT
Start: 2024-11-11

## 2024-11-12 ENCOUNTER — TELEMEDICINE (OUTPATIENT)
Dept: NEUROLOGY | Facility: MEDICAL CENTER | Age: 54
End: 2024-11-12
Attending: INTERNAL MEDICINE
Payer: COMMERCIAL

## 2024-11-12 VITALS
WEIGHT: 171 LBS | BODY MASS INDEX: 33.57 KG/M2 | DIASTOLIC BLOOD PRESSURE: 76 MMHG | SYSTOLIC BLOOD PRESSURE: 109 MMHG | HEART RATE: 69 BPM | HEIGHT: 60 IN

## 2024-11-12 DIAGNOSIS — G45.9 TIA (TRANSIENT ISCHEMIC ATTACK): ICD-10-CM

## 2024-11-12 DIAGNOSIS — G51.32 HEMIFACIAL SPASM OF LEFT SIDE OF FACE: ICD-10-CM

## 2024-11-12 PROCEDURE — G2211 COMPLEX E/M VISIT ADD ON: HCPCS | Mod: 95 | Performed by: INTERNAL MEDICINE

## 2024-11-12 PROCEDURE — 99213 OFFICE O/P EST LOW 20 MIN: CPT | Mod: 95 | Performed by: INTERNAL MEDICINE

## 2024-11-12 ASSESSMENT — FIBROSIS 4 INDEX: FIB4 SCORE: 1.01

## 2024-11-12 ASSESSMENT — PATIENT HEALTH QUESTIONNAIRE - PHQ9: CLINICAL INTERPRETATION OF PHQ2 SCORE: 0

## 2024-11-12 NOTE — Clinical Note
Patient forgot to call back clinic to schedule Botox appointment for hemifacial spasm. Can you help check and verify if it is still authorized, and to help schedule? Thanks!

## 2024-11-12 NOTE — PROGRESS NOTES
Dillan Hernández,   Neurology, [facility]  [address], [address]. [place] [address]  Phone: [phone], Fax: [phone]     ASSESSMENT / PLAN   Yadira Suarez is a 54 y.o. RHD female presenting for tremors and facial weakness     LEFT hemifacial spasm  Onset 2018. MRI Brain 2/2024 unremarkable for brain stem or facial nerve findings. Consider repeating cranial nerve protocol with contrast in future.  Exam notable for left obicularis oculi and trace zygomaticus and risorius spasms  - Start Botox 100U approval    LEFT Bell's palsy  Onset 1/2024, improving. Likely etiology of the left hemifacial spasm      Transient ischemic attack  Per cardiology: continue statin, losartan, aspirin 81mg. THANG completed 8/2024 showed small PFO (bubbles seen, but PFO not visualized), not candidate for closure. US Venous LE 9/2024: no DVT, normal veins.     Dilated atrium may place her at higher risk of developing atrial fibrillation  - repeat heart monitor (zio patch). Last done in 2019 with Dr. Cassidy in [facility] (notes in CareEverywhere)    Bilateral leg tremors  Working diagnosis as orthostatic tremor: worse with standing, improved when walking  - consider magnesium threonate, 200-400mg  - gabapentin 100mg, 1 capsule, 2-3x/day     Dizziness  - consider ENT/vestibular testing if not improving with continued PT. Normal saccades, does not appear vertiginous in nature     Possible right MCA 1mm aneurysm  - repeat CTA Head in 1/2025       Orders Placed This Encounter    [facility] ZIO PATCH MONITOR     Return for Botox no ultrasound.    BILLING DOCUMENTATION:   Excluding time spent on procedures during visit, I spent 20 minutes reviewing the medical record, interviewing and examining the patient, discussing diagnosis and treatment, and coordinating care.    This evaluation was conducted via Microsoft Teams using secure and encrypted videoconferencing technology. The patient was in  their home in the [facility].    The patient's identity was confirmed and verbal consent was obtained for this virtual visit.            HISTORY OF PRESENT ILLNESS   Yadira Suarez is a 54 y.o. RHD female presenting for tremors and facial weakness     PMHx: total hysterectomy, ovarian cancer, hemifacial spasm, hyperparathyroidism status post parathyroidectomy 2019  Working as a RN     Initial HPI 02/29/24  Prior neurologist:   - Dr. Vivien Juares at [place]  - Dr. Schumacher at [facility]  - Dr. Patel at [facility]     Left facial weakness  Facial weakness improving 90%  2/21/2024: while at work, had slurred speech. Left facial droop thought to be secondary to Bell's palsy. Discharged with the following changes:     Aspirin 81mg  Atorvastatin 40mg  Losartan 25mg --> 50mg     Left blepharospasm  Left blepharospasm since 2018: left obicularis oculi and rhizorius regions spasm. No preceding injury or rash, no prior episodes of bell's palsy before 2024 episode.   Dr. Schumacher did botox x3 sessions. Left face, 30U total.  - bothersome at work, interested in starting injections again  11/12/24: wasn't able to get scheduled, interested in starting Botox     Vision change  2018: left monocular left visual field loss, thought to be lacunar stroke. MRI negative for acute stroke. Since then, visual field has improved close to baseline. Retinal testing normal. Optometry exam 9/2023 notes reviewed: VF testing normal. Normal fundus exam     Leg shakiness  Parathyroid surgery 2019. Progressively felt weaker since then.      2021: leg weakness and shakiness. Muscle aches worsening. Last time active with crossfit, hiking, running 50+ miles in 2022. Weight change 50+ in past year. Shakiness worse with prolonged standing. Some pain going sitting to stand, radiating from waist and down. 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 Prior neurologist thought to be orthostatic tremor but didn't start  "medication to treat (thought to be antiseizure medication)  - woke up this morning, \"like nervous feeling\". Less severe sitting to stand when in afternoon. Active movement suppresses it.   - deep achy discomfort from waist down  - endurance better: doing peloton  - wine didn't worsen it or improve it     Recent right leg injury when lifting at work, resulting in lateral paresthesias, lateral foot, and underside of foot  - Better now     Dizziness  Valsalva sneezing, coughing,  causes \"motion\" side to side. Last happened on Friday 2/24/2024. Frequency approx 1/month. No headaches associated. Unclear duration of episode. Loud sounds doesn't trigger it. No vestibular testing in past.       Past Medical History:   Diagnosis Date    Anesthesia 07/25/2024    nausea, history of vertigo    Hypertension 07/25/2024    medicated    Infectious disease     MRSA/VRE - works in hospital    Other specified symptom associated with female genital organs     hysterectomy    Pain     Parathyroid disorder (HCC)     PONV (postoperative nausea and vomiting) 07/25/2024    history of vertigo    Snoring 07/25/2024    Stroke ([facility]) 07/25/2024 4/2024, no residual issues, left eye is smaller than right     Past Surgical History:   Procedure Laterality Date    PARATHYROIDECTOMY Right 05/02/2019    ABDOMINOPLASTY  12/11/2013    Performed by Sherrie Knott M.D. at SURGERY [facility]    FAITH BY LAPAROSCOPY  9/27/2012    Performed by Jana Henriquez M.D. at SURGERY [facility]    PELVISCOPY  9/16/2010    Performed by LALY FALLON at SURGERY [facility]    ABDOMINAL HYSTERECTOMY TOTAL  2006    fibroid removal      Family History   Problem Relation Age of Onset    Diabetes Mother         diet controlled    Hypertension Father     Heart Disease Father         MI age 61    Cancer Father         prostate ca    Cancer Sister         ovarian lesion    Heart Disease Brother         blood clot heart     Social History "     Socioeconomic History    Marital status:      Spouse name: Federico    Number of children: Not on file    Years of education: Not on file    Highest education level: Bachelor's degree (e.g., BA, AB, BS)   Occupational History    Occupation: ER      Employer: [employer]   Tobacco Use    Smoking status: Never    Smokeless tobacco: Never   Vaping Use    Vaping status: Never Used   Substance and Sexual Activity    Alcohol use: Yes     Comment: OCC (Holidays)    Drug use: No    Sexual activity: Yes     Partners: Male     Birth control/protection: Surgical   Other Topics Concern    Not on file   Social History Narrative    Not on file     Social Drivers of Health     Financial Resource Strain: Low Risk  (9/24/2024)    Overall Financial Resource Strain (CARDIA)     Difficulty of Paying Living Expenses: Not hard at all   Food Insecurity: No Food Insecurity (9/24/2024)    Hunger Vital Sign     Worried About Running Out of Food in the Last Year: Never true     Ran Out of Food in the Last Year: Never true   Transportation Needs: No Transportation Needs (9/24/2024)    PRAPARE - Transportation     Lack of Transportation (Medical): No     Lack of Transportation (Non-Medical): No   Physical Activity: Insufficiently Active (9/24/2024)    Exercise Vital Sign     Days of Exercise per Week: 4 days     Minutes of Exercise per Session: 30 min   Stress: No Stress Concern Present (9/24/2024)    [place] [employer] - Occupational Stress Questionnaire     Feeling of Stress : Only a little   Social Connections: Moderately Integrated (9/24/2024)    Social Connection and Isolation Panel [NHANES]     Frequency of Communication with Friends and Family: Once a week     Frequency of Social Gatherings with Friends and Family: Once a week     Attends Voodoo Services: More than 4 times per year     Active Member of Clubs or Organizations: Yes     Attends Club or Organization Meetings: More than 4 times per year     Marital  Status:    Intimate Partner Violence: Not on file   Housing Stability: Unknown (9/24/2024)    Housing Stability Vital Sign     Unable to Pay for Housing in the Last Year: No     Number of Times Moved in the Last Year: Not on file     Homeless in the Last Year: No     Current Outpatient Medications   Medication    alendronate (FOSAMAX) 70 MG Tab    losartan (COZAAR) 50 MG Tab    atorvastatin (LIPITOR) 40 MG Tab    gabapentin (NEURONTIN) 100 MG Cap    aspirin 81 MG EC tablet    OnabotulinumtoxinA (BOTOX INJ)    Magnesium Glycinate 100 MG Cap    Cholecalciferol (VITAMIN D) 2000 UNIT Tab    omeprazole (PRILOSEC) 20 MG delayed-release capsule    Multiple Vitamin (MULTIVITAMIN PO)     No current facility-administered medications for this visit.     Allergies   Allergen Reactions    Pork Allergy Rash and Itching     itching             DATA / RESULTS   MRI C-spine 3/2023:  1.  Mild degenerative disease in the cervical spine as described above.  2.  There are multiple enlarged bilateral jugulodigastric and submandibular lymph nodes. The right submandibular lymph node measures an approximately 1.3 cm. This lymph nodes likely represents reactive lymphadenopathy. However, the possibility of   pathological lymphadenopathy cannot be excluded. Please correlate with the clinical examination.     MRI T-spine 3/2023  MRI of the thoracic spine without and with contrast within normal limits except for small central disc protrusion at T6-7 without spinal or neural foraminal stenosis..      Echocardiogram 2/2024:  Normal left ventricular systolic function.  Grade II diastolic dysfunction.  Moderately dilated left atrium.  Evidence of early (0-5 beats) right to left shunt suggestive of intracardiac shunt (ASD or PFO).     US Venous Leg 2/2024:   No acute thrombosis is identified.      MRI Brain 2/2024: reviewed and agree with report  1.  No acute abnormality.  2.  Mild chronic microvascular ischemic disease.  3.  Mild tonsillar  ectopia.  4.  There has been no significant interval change.     CTA Neck 2/2024:  CT angiogram of the neck within normal limits.      CTA Head 2/2024:  1.  No evidence of intracranial vascular occlusion.   2.  Possible tiny 1 mm aneurysm emanating from the right middle cerebral artery distribution from an M2 branch.    MRI Lumbar 6/2024  1.  Mild discal and endplate degenerative changes at the L5-S1 level.   2.  Minimal annular bulging at the L5-S1 level which extends into the inferior aspects of the neural foramina bilaterally.    25-Hydroxy   Vitamin D 25   Date Value Ref Range Status   02/21/2024 27 (L) 30 - 100 ng/mL Final     Comment:     Adult Ranges:   <20 ng/mL - Deficiency  20-29 ng/mL - Insufficiency   ng/mL - Sufficiency  Electrochemiluminescence binding assay performed using Roche kelton e  immunoassay analyzer.  The Elecsys Vitamin D total II assay is intended for  the quantitative determination of total 25 hydroxyvitamin D in human serum  and plasma. This assay is to be used as an aid in the assessment of vitamin  D sufficiency in adults.       Vitamin B12 -True Cobalamin   Date Value Ref Range Status   02/21/2024 569 211 - 911 pg/mL Final     TSH   Date Value Ref Range Status   02/21/2024 1.270 0.380 - 5.330 uIU/mL Final     Comment:     The 2011 American Thyroid Association (JUANA) guidelines  recommended that the interpretation of thyroid function in  pregnancy be based on trimester specific reference ranges.    1st Trimester  0.100-2.500 mIU/L  2nd Trimester  0.200-3.000 mIU/L  3rd Trimester  0.300-3.500 mIU/L    These established reference ranges have not been validated  at [facility].       Glycohemoglobin   Date Value Ref Range Status   02/11/2019 5.2 4.0 - 5.6 % Final     LDL   Date Value Ref Range Status   05/30/2024 51 <100 mg/dL Final                OBJECTIVE      Vitals:    11/12/24 0702   BP: 109/76   Pulse: 69   Weight: 77.6 kg (171 lb)   Height: 1.524 m (5')      Physical Exam           PROCEDURE     N/A

## 2024-11-13 ENCOUNTER — PATIENT MESSAGE (OUTPATIENT)
Dept: CARDIOLOGY | Facility: MEDICAL CENTER | Age: 54
End: 2024-11-13
Payer: COMMERCIAL

## 2024-11-13 NOTE — PATIENT COMMUNICATION
To MARTY: Please see patient's MyChart asking for a new cardiac monitor and advise if okay to order. Neuro note in Epic has TIA history. Thank you!

## 2024-11-13 NOTE — PATIENT COMMUNICATION
Khadijah David P.A.-C.  You1 hour ago (2:22 PM)     Yes that is reasonable test for her. Looks like Dr. Hernández ordered the event monitor.       To schedulers: Please reach out to patient to set up cardiac monitor per patient request. Thank you!

## 2024-11-14 ENCOUNTER — TELEPHONE (OUTPATIENT)
Dept: CARDIOLOGY | Facility: MEDICAL CENTER | Age: 54
End: 2024-11-14
Payer: COMMERCIAL

## 2024-11-21 ENCOUNTER — NON-PROVIDER VISIT (OUTPATIENT)
Dept: CARDIOLOGY | Facility: MEDICAL CENTER | Age: 54
End: 2024-11-21
Attending: INTERNAL MEDICINE
Payer: COMMERCIAL

## 2024-11-21 DIAGNOSIS — G45.9 TIA (TRANSIENT ISCHEMIC ATTACK): ICD-10-CM

## 2024-11-21 PROCEDURE — 93246 EXT ECG>7D<15D RECORDING: CPT

## 2024-11-21 NOTE — PROGRESS NOTES
PATIENT ENROLLED IN THE 14 DAY ZIO HOLTER MONITOR PER Dillan Hernández D.O.  IN CLINIC HOOK UP  MONITOR #: [id number]  PENDING EOS....

## 2024-12-09 ENCOUNTER — OFFICE VISIT (OUTPATIENT)
Dept: CARDIOLOGY | Facility: MEDICAL CENTER | Age: 54
End: 2024-12-09
Attending: INTERNAL MEDICINE
Payer: COMMERCIAL

## 2024-12-09 VITALS
HEIGHT: 60 IN | RESPIRATION RATE: 16 BRPM | DIASTOLIC BLOOD PRESSURE: 70 MMHG | OXYGEN SATURATION: 96 % | BODY MASS INDEX: 34.36 KG/M2 | WEIGHT: 175 LBS | SYSTOLIC BLOOD PRESSURE: 108 MMHG | HEART RATE: 71 BPM

## 2024-12-09 DIAGNOSIS — G45.9 TIA (TRANSIENT ISCHEMIC ATTACK): ICD-10-CM

## 2024-12-09 DIAGNOSIS — E78.5 DYSLIPIDEMIA: ICD-10-CM

## 2024-12-09 DIAGNOSIS — I83.813 VARICOSE VEINS OF BOTH LOWER EXTREMITIES WITH PAIN: ICD-10-CM

## 2024-12-09 DIAGNOSIS — I10 PRIMARY HYPERTENSION: ICD-10-CM

## 2024-12-09 DIAGNOSIS — Q21.12 PFO (PATENT FORAMEN OVALE): ICD-10-CM

## 2024-12-09 PROCEDURE — 3074F SYST BP LT 130 MM HG: CPT | Performed by: INTERNAL MEDICINE

## 2024-12-09 PROCEDURE — 3078F DIAST BP <80 MM HG: CPT | Performed by: INTERNAL MEDICINE

## 2024-12-09 PROCEDURE — 99212 OFFICE O/P EST SF 10 MIN: CPT | Performed by: INTERNAL MEDICINE

## 2024-12-09 PROCEDURE — 99214 OFFICE O/P EST MOD 30 MIN: CPT | Performed by: INTERNAL MEDICINE

## 2024-12-09 ASSESSMENT — ENCOUNTER SYMPTOMS
HALLUCINATIONS: 0
NAUSEA: 0
PALPITATIONS: 0
DEPRESSION: 0
VOMITING: 0
DOUBLE VISION: 0
WEIGHT LOSS: 0
DIZZINESS: 0
EYE PAIN: 0
ABDOMINAL PAIN: 0
PND: 0
COUGH: 0
BLURRED VISION: 0
HEADACHES: 0
SHORTNESS OF BREATH: 0
BLOOD IN STOOL: 0
SPEECH CHANGE: 0
FEVER: 0
MYALGIAS: 0
EYE DISCHARGE: 0
ORTHOPNEA: 0
SENSORY CHANGE: 0
CHILLS: 0
BRUISES/BLEEDS EASILY: 0
CLAUDICATION: 0
FALLS: 0
LOSS OF CONSCIOUSNESS: 0

## 2024-12-09 ASSESSMENT — FIBROSIS 4 INDEX: FIB4 SCORE: 1.01

## 2024-12-09 NOTE — PROGRESS NOTES
Chief Complaint   Patient presents with    Dyslipidemia    Transient Ischemic Attack    Hypertension       Subjective     Alexandra Suarez is a 54 y.o. female who presents today for cardiac care and management of hypertension and dyslipidemia.  She did have Bell's palsy earlier this year. She was treated with steroids. There was also concern for TIA vs stroke. She did have TTE which showed right to left shunt with positive bubbles study.  Patient does see a neurologist who was not sure if it was Bell's palsy versus TIA or stroke.    08/2024 Patent foramen ovale was not able to be visually seen. However, there was small amount of bubbles crossing over from right to left with Valsalva maneuver, suggestive of small PFO. Overall, not a candidate for closure.     09/2024 No reflux disease seen in bilateral small and great saphenous veins.     I personally interpreted the images of her echocardiogram, THANG.    I have independently interpreted and reviewed blood tests results with patient in clinic which shows LDL level of 51, triglycerides level of 69, GFR of 109, NT pro BNP of 109, K of 3.6.    I have personally interpreted EKG today with patient, there is no evidence of acute coronary syndrome, no evidence of prior infarct, normal PA and QT interval, no significant conduction disease. Sinus rhythm.    In the interim, patient has been doing well without having any symptoms. Patient denies having chest pain, dyspnea, palpitation, presyncope, syncope episodes. Able to climb up at least 2 flights of stairs.      Past Medical History:   Diagnosis Date    Anesthesia 07/25/2024    nausea, history of vertigo    Hypertension 07/25/2024    medicated    Infectious disease     MRSA/VRE - works in hospital    Other specified symptom associated with female genital organs     hysterectomy    Pain     Parathyroid disorder (HCC)     PONV (postoperative nausea and vomiting) 07/25/2024    history of vertigo    Snoring 07/25/2024     Stroke (HCC) 07/25/2024 4/2024, no residual issues, left eye is smaller than right     Past Surgical History:   Procedure Laterality Date    PARATHYROIDECTOMY Right 05/02/2019    ABDOMINOPLASTY  12/11/2013    Performed by Sherrie Knott M.D. at SURGERY [facility]    FAITH BY LAPAROSCOPY  9/27/2012    Performed by Jana Henriquez M.D. at SURGERY [facility]    PELVISCOPY  9/16/2010    Performed by LALY FALLON at SURGERY [facility]    ABDOMINAL HYSTERECTOMY TOTAL  2006    fibroid removal      Family History   Problem Relation Age of Onset    Diabetes Mother         diet controlled    Hypertension Father     Heart Disease Father         MI age 61    Cancer Father         prostate ca    Cancer Sister         ovarian lesion    Heart Disease Brother         blood clot heart     Social History     Socioeconomic History    Marital status:      Spouse name: Federico    Number of children: Not on file    Years of education: Not on file    Highest education level: Bachelor's degree (e.g., BA, AB, BS)   Occupational History    Occupation: ER      Employer: [employer]   Tobacco Use    Smoking status: Never    Smokeless tobacco: Never   Vaping Use    Vaping status: Never Used   Substance and Sexual Activity    Alcohol use: Yes     Comment: OCC (Holidays)    Drug use: No    Sexual activity: Yes     Partners: Male     Birth control/protection: Surgical   Other Topics Concern    Not on file   Social History Narrative    Not on file     Social Drivers of Health     Financial Resource Strain: Low Risk  (9/24/2024)    Overall Financial Resource Strain (CARDIA)     Difficulty of Paying Living Expenses: Not hard at all   Food Insecurity: No Food Insecurity (9/24/2024)    Hunger Vital Sign     Worried About Running Out of Food in the Last Year: Never true     Ran Out of Food in the Last Year: Never true   Transportation Needs: No Transportation Needs (9/24/2024)    PRAPARE - Transportation     Lack of  Transportation (Medical): No     Lack of Transportation (Non-Medical): No   Physical Activity: Insufficiently Active (9/24/2024)    Exercise Vital Sign     Days of Exercise per Week: 4 days     Minutes of Exercise per Session: 30 min   Stress: No Stress Concern Present (9/24/2024)    [place] [employer] Occupational Health - Occupational Stress Questionnaire     Feeling of Stress : Only a little   Social Connections: Moderately Integrated (9/24/2024)    Social Connection and Isolation Panel [NHANES]     Frequency of Communication with Friends and Family: Once a week     Frequency of Social Gatherings with Friends and Family: Once a week     Attends Jainism Services: More than 4 times per year     Active Member of Clubs or Organizations: Yes     Attends Club or Organization Meetings: More than 4 times per year     Marital Status:    Intimate Partner Violence: Not on file   Housing Stability: Unknown (9/24/2024)    Housing Stability Vital Sign     Unable to Pay for Housing in the Last Year: No     Number of Times Moved in the Last Year: Not on file     Homeless in the Last Year: No     Allergies   Allergen Reactions    Pork Allergy Rash and Itching     itching     Outpatient Encounter Medications as of 12/9/2024   Medication Sig Dispense Refill    alendronate (FOSAMAX) 70 MG Tab Take 1 Tablet by mouth every 7 days. 12 Tablet 3    losartan (COZAAR) 50 MG Tab Take 1 Tablet by mouth every day. (Patient taking differently: Take 50 mg by mouth every day.     MEDICATION INSTRUCTIONS FOR SURGERY/PROCEDURE SCHEDULED FOR 8/19/24   OK TO CONTINUE TAKING PRIOR TO SURGERY AND DAY OF SURGERY PER CARDIOLOGIST) 100 Tablet 1    atorvastatin (LIPITOR) 40 MG Tab Take 1 Tablet by mouth every evening. (Patient taking differently: Take 40 mg by mouth every evening.     MEDICATION INSTRUCTIONS FOR SURGERY/PROCEDURE SCHEDULED FOR 8/19/24   CONTINUE TAKING MED PRIOR TO SURGERY) 100 Tablet 4    aspirin 81 MG EC tablet Take 81 mg by  mouth every day.   MEDICATION INSTRUCTIONS FOR SURGERY/PROCEDURE SCHEDULED FOR 8/19/24   OK TO CONTINUE TAKING PRIOR TO SURGERY AND DAY OF SURGERY PER CARDIOLOGIST      OnabotulinumtoxinA (BOTOX INJ) Inject 1 Dose as directed every 6 months. Eye muscle spasms      Cholecalciferol (VITAMIN D) 2000 UNIT Tab Take 2,000 Units by mouth every day. 4,000 units daily      MEDICATION INSTRUCTIONS FOR SURGERY/PROCEDURE SCHEDULED FOR 8/19/24   OK TO CONTINUE TAKING PRIOR TO SURGERY AND DAY OF SURGERY PER CARDIOLOGIST      omeprazole (PRILOSEC) 20 MG delayed-release capsule Take 20 mg by mouth every day.     MEDICATION INSTRUCTIONS FOR SURGERY/PROCEDURE SCHEDULED FOR 8/19/24   OK TO CONTINUE TAKING PRIOR TO SURGERY AND DAY OF SURGERY PER CARDIOLOGIST      [DISCONTINUED] gabapentin (NEURONTIN) 100 MG Cap Take 1 Capsule by mouth 3 times a day. (Patient not taking: Reported on 12/9/2024) 270 Capsule 3    [DISCONTINUED] Magnesium Glycinate 100 MG Cap Take 200 mg by mouth every day at 6 PM.     MEDICATION INSTRUCTIONS FOR SURGERY/PROCEDURE SCHEDULED FOR 8/19/24   CONTINUE TAKING MED PRIOR TO SURGERY      [DISCONTINUED] Multiple Vitamin (MULTIVITAMIN PO) Take 1 Tablet by mouth every day.     MEDICATION INSTRUCTIONS FOR SURGERY/PROCEDURE SCHEDULED FOR 8/19/24   OK TO CONTINUE TAKING PRIOR TO SURGERY AND DAY OF SURGERY PER CARDIOLOGIST       No facility-administered encounter medications on file as of 12/9/2024.     Review of Systems   Constitutional:  Negative for chills, fever, malaise/fatigue and weight loss.   HENT:  Negative for ear discharge, ear pain, hearing loss and nosebleeds.    Eyes:  Negative for blurred vision, double vision, pain and discharge.   Respiratory:  Negative for cough and shortness of breath.    Cardiovascular:  Negative for chest pain, palpitations, orthopnea, claudication, leg swelling and PND.   Gastrointestinal:  Negative for abdominal pain, blood in stool, melena, nausea and vomiting.   Genitourinary:   Negative for dysuria and hematuria.   Musculoskeletal:  Negative for falls, joint pain and myalgias.   Skin:  Negative for itching and rash.   Neurological:  Negative for dizziness, sensory change, speech change, loss of consciousness and headaches.   Endo/Heme/Allergies:  Negative for environmental allergies. Does not bruise/bleed easily.   Psychiatric/Behavioral:  Negative for depression, hallucinations and suicidal ideas.               Objective     /70 (BP Location: Left arm, Patient Position: Sitting, BP Cuff Size: Adult)   Pulse 71   Resp 16   Ht 1.524 m (5')   Wt 79.4 kg (175 lb)   SpO2 96%   BMI 34.18 kg/m²     Physical Exam  Vitals and nursing note reviewed.   Constitutional:       General: She is not in acute distress.     Appearance: She is not diaphoretic.   HENT:      Head: Normocephalic and atraumatic.      Right Ear: External ear normal.      Left Ear: External ear normal.      Nose: No congestion or rhinorrhea.   Eyes:      General:         Right eye: No discharge.         Left eye: No discharge.   Neck:      Thyroid: No thyromegaly.      Vascular: No JVD.   Cardiovascular:      Rate and Rhythm: Normal rate and regular rhythm.      Pulses: Normal pulses.   Pulmonary:      Effort: No respiratory distress.   Abdominal:      General: There is no distension.      Tenderness: There is no abdominal tenderness.   Musculoskeletal:         General: No swelling or tenderness.      Right lower leg: No edema.      Left lower leg: No edema.   Skin:     General: Skin is warm and dry.   Neurological:      Mental Status: She is alert and oriented to person, place, and time.      Cranial Nerves: Cranial nerve deficit present.      Sensory: Sensory deficit present.   Psychiatric:         Behavior: Behavior normal.                Assessment & Plan     1. PFO (patent foramen ovale)        2. Primary hypertension        3. TIA (transient ischemic attack)        4. Dyslipidemia        5. Varicose veins of both  lower extremities with pain              Medical Decision Making: Today's Assessment/Status/Plan:   Will discuss with structural heart to see if candidate for PFO closure in setting of inability to rule out TIAs.    Blood pressure is well controlled. Continue Losartan 50 mg daily.    Continue Atorvastatin 40 mg daily.    At this time, optimize use of compression stockings with at least grading of 20 to 30 mmHg, at least knee-high.  Thigh-high length is ideal.       This visit encounter signifies the visit complexity inherent to evaluation and management associated with medical care services that serve as the continuing focal point for all needed health care services and/or with medical care services that are part of ongoing care related to this patient's single, serious condition, complex cardiac condition.    Ben Carrero M.D.

## 2024-12-11 ENCOUNTER — TELEPHONE (OUTPATIENT)
Dept: CARDIOLOGY | Facility: MEDICAL CENTER | Age: 54
End: 2024-12-11
Payer: COMMERCIAL

## 2024-12-12 NOTE — TELEPHONE ENCOUNTER
CHAITANYA EOS to TT's nurse, Amy, on 12/11/2024    Preliminary findings:    1 episode of -115 with an avg rate of 115 bpm    Sinus rhythm  with an avg rate of 82 bpm    Isolated SVE(s) and VE(s) were rare    3 patient events associated with:  SR 71-86

## 2024-12-20 ENCOUNTER — OFFICE VISIT (OUTPATIENT)
Dept: NEUROLOGY | Facility: MEDICAL CENTER | Age: 54
End: 2024-12-20
Attending: INTERNAL MEDICINE
Payer: COMMERCIAL

## 2024-12-20 VITALS
TEMPERATURE: 97.7 F | SYSTOLIC BLOOD PRESSURE: 110 MMHG | WEIGHT: 174.6 LBS | RESPIRATION RATE: 18 BRPM | OXYGEN SATURATION: 97 % | DIASTOLIC BLOOD PRESSURE: 76 MMHG | HEIGHT: 61 IN | HEART RATE: 67 BPM | BODY MASS INDEX: 32.97 KG/M2

## 2024-12-20 DIAGNOSIS — G51.32 HEMIFACIAL SPASM OF LEFT SIDE OF FACE: ICD-10-CM

## 2024-12-20 PROCEDURE — 700111 HCHG RX REV CODE 636 W/ 250 OVERRIDE (IP): Mod: JZ

## 2024-12-20 PROCEDURE — 64612 DESTROY NERVE FACE MUSCLE: CPT | Performed by: INTERNAL MEDICINE

## 2024-12-20 PROCEDURE — 700101 HCHG RX REV CODE 250

## 2024-12-20 PROCEDURE — 64612 DESTROY NERVE FACE MUSCLE: CPT | Mod: LT | Performed by: INTERNAL MEDICINE

## 2024-12-20 PROCEDURE — 99212 OFFICE O/P EST SF 10 MIN: CPT | Performed by: INTERNAL MEDICINE

## 2024-12-20 PROCEDURE — 3078F DIAST BP <80 MM HG: CPT | Performed by: INTERNAL MEDICINE

## 2024-12-20 PROCEDURE — 3074F SYST BP LT 130 MM HG: CPT | Performed by: INTERNAL MEDICINE

## 2024-12-20 RX ADMIN — SODIUM CHLORIDE 100 UNITS: 9 INJECTION, SOLUTION INTRAMUSCULAR; INTRAVENOUS; SUBCUTANEOUS at 10:39

## 2024-12-20 ASSESSMENT — PATIENT HEALTH QUESTIONNAIRE - PHQ9
5. POOR APPETITE OR OVEREATING: 0 - NOT AT ALL
SUM OF ALL RESPONSES TO PHQ QUESTIONS 1-9: 3
CLINICAL INTERPRETATION OF PHQ2 SCORE: 1

## 2024-12-20 ASSESSMENT — FIBROSIS 4 INDEX: FIB4 SCORE: 1.01

## 2024-12-20 NOTE — PROGRESS NOTES
Dillan Hernández,   Neurology, Movement Disorders [facility]  [address], [address]. [place] [address]  Phone: [phone], Fax: [phone]     ASSESSMENT / PLAN   Yadira Suarez is a 54 y.o. RHD female presenting for tremors and facial weakness     LEFT hemifacial spasm  Onset 2018. MRI Brain 2/2024 unremarkable for brain stem or facial nerve findings.   Exam notable for left obicularis oculi and trace zygomaticus and risorius spasms  - Consider repeating cranial nerve protocol with contrast in future.  - Repeat Botox 100U every 3 months for hemifacial spasm    LEFT Bell's palsy  Onset 1/2024, improving. Likely etiology of the left hemifacial spasm  Stroke workup for 2/2024 episode of left facial weakness. Isolated left facial weakness with normal MRI Brain makes TIA unlikely.   THANG completed 8/2024 showed small PFO (bubbles seen, but PFO not visualized), not candidate for closure. Dilated left atrium noted.   US Venous LE 9/2024: no DVT, normal veins.   Heart monitor (zio patch) 2019 with Dr. Cassidy in [facility] (notes in CareEverywhere)  Repeat 12/2024: Short runs of supraventricular tachycardia that do not meet criteria for diagnosis of atrial flutter or atrial fibrillation.   - Per cardiology: continue atorvastatin 40mg, losartan, aspirin 81mg.    Bilateral leg tremors  Working diagnosis as orthostatic tremor: worse with standing, improved when walking  - consider magnesium threonate, 200-400mg  - gabapentin 100mg, 1 capsule, 2-3x/day     Dizziness  - consider ENT/vestibular testing if not improving with continued PT. 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    BILLING DOCUMENTATION:   47420 Face              HISTORY OF  "PRESENT ILLNESS   Yadira Suarez is a 54 y.o. RHD female presenting for tremors and facial weakness     PMHx: total hysterectomy, ovarian cancer, hemifacial spasm, hyperparathyroidism status post parathyroidectomy 2019  Working as a RN     Initial HPI 02/29/24  Prior neurologist:   - Dr. Vivien Juares at [place]  - Dr. Schumacher at [facility] [place]  - Dr. Patel at [facility]     Left facial weakness  Facial weakness improving 90%  2/21/2024: while at work, had slurred speech. Left facial droop thought to be secondary to Bell's palsy. Discharged with the following changes:   Aspirin 81mg  Atorvastatin 40mg  Losartan 25mg --> 50mg     Left blepharospasm  Left blepharospasm since 2018: left obicularis oculi and rhizorius regions spasm. No preceding injury or rash, no prior episodes of bell's palsy before 2024 episode.   Dr. Schumacher did botox x3 sessions. Left face, 30U total.  - bothersome at work, interested in starting injections again  11/12/24: wasn't able to get scheduled, interested in starting Botox     Vision change  2018: left monocular left visual field loss, thought to be lacunar stroke. MRI negative for acute stroke. Since then, visual field has improved close to baseline. Retinal testing normal. Optometry exam 9/2023 notes reviewed: VF testing normal. Normal fundus exam     Leg shakiness  Parathyroid surgery 2019. Progressively felt weaker since then.      2021: leg weakness and shakiness. Muscle aches worsening. Last time active with crossfit, hiking, running 50+ miles in 2022. Weight change 50+ in past year. Shakiness worse with prolonged standing. Some pain going sitting to stand, radiating from waist and down. 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 Prior neurologist thought to be orthostatic tremor but didn't start medication to treat (thought to be antiseizure medication)  - woke up this morning, \"like nervous feeling\". Less severe sitting to stand when in afternoon. Active " "movement suppresses it.   - deep achy discomfort from waist down  - endurance better: doing peloton  - wine didn't worsen it or improve it     Recent right leg injury when lifting at work, resulting in lateral paresthesias, lateral foot, and underside of foot  - Better now     Dizziness  Valsalva sneezing, coughing,  causes \"motion\" side to side. Last happened on Friday 2/24/2024. Frequency approx 1/month. No headaches associated. Unclear duration of episode. Loud sounds doesn't trigger it. No vestibular testing in past.       Past Medical History:   Diagnosis Date    Anesthesia 07/25/2024    nausea, history of vertigo    Hypertension 07/25/2024    medicated    Infectious disease     MRSA/VRE - works in hospital    Other specified symptom associated with female genital organs     hysterectomy    Pain     Parathyroid disorder (HCC)     PONV (postoperative nausea and vomiting) 07/25/2024    history of vertigo    Snoring 07/25/2024    Stroke ([facility]) 07/25/2024 4/2024, no residual issues, left eye is smaller than right     Past Surgical History:   Procedure Laterality Date    PARATHYROIDECTOMY Right 05/02/2019    ABDOMINOPLASTY  12/11/2013    Performed by Sherrie Knott M.D. at SURGERY [facility]    FAITH BY LAPAROSCOPY  9/27/2012    Performed by Jana Henriquez M.D. at SURGERY [facility]    PELVISCOPY  9/16/2010    Performed by LALY FALLON at SURGERY [facility]    ABDOMINAL HYSTERECTOMY TOTAL  2006    fibroid removal      Family History   Problem Relation Age of Onset    Diabetes Mother         diet controlled    Hypertension Father     Heart Disease Father         MI age 61    Cancer Father         prostate ca    Cancer Sister         ovarian lesion    Heart Disease Brother         blood clot heart     Social History     Socioeconomic History    Marital status:      Spouse name: Dex    Number of children: Not on file    Years of education: Not on file    Highest education level: " Bachelor's degree (e.g., BA, AB, BS)   Occupational History    Occupation: ER      Employer: [employer]   Tobacco Use    Smoking status: Never    Smokeless tobacco: Never   Vaping Use    Vaping status: Never Used   Substance and Sexual Activity    Alcohol use: Yes     Comment: OCC (Holidays)    Drug use: No    Sexual activity: Yes     Partners: Male     Birth control/protection: Surgical   Other Topics Concern    Not on file   Social History Narrative    Not on file     Social Drivers of Health     Financial Resource Strain: Low Risk  (9/24/2024)    Overall Financial Resource Strain (CARDIA)     Difficulty of Paying Living Expenses: Not hard at all   Food Insecurity: No Food Insecurity (9/24/2024)    Hunger Vital Sign     Worried About Running Out of Food in the Last Year: Never true     Ran Out of Food in the Last Year: Never true   Transportation Needs: No Transportation Needs (9/24/2024)    PRAPARE - Transportation     Lack of Transportation (Medical): No     Lack of Transportation (Non-Medical): No   Physical Activity: Insufficiently Active (9/24/2024)    Exercise Vital Sign     Days of Exercise per Week: 4 days     Minutes of Exercise per Session: 30 min   Stress: No Stress Concern Present (9/24/2024)    [employer] [place] [employer] - Occupational Stress Questionnaire     Feeling of Stress : Only a little   Social Connections: Moderately Integrated (9/24/2024)    Social Connection and Isolation Panel [NHANES]     Frequency of Communication with Friends and Family: Once a week     Frequency of Social Gatherings with Friends and Family: Once a week     Attends Spiritism Services: More than 4 times per year     Active Member of Clubs or Organizations: Yes     Attends Club or Organization Meetings: More than 4 times per year     Marital Status:    Intimate Partner Violence: Not on file   Housing Stability: Unknown (9/24/2024)    Housing Stability Vital Sign     Unable to Pay for Housing in the Last  Year: No     Number of Times Moved in the Last Year: Not on file     Homeless in the Last Year: No     Current Outpatient Medications   Medication    alendronate (FOSAMAX) 70 MG Tab    losartan (COZAAR) 50 MG Tab    atorvastatin (LIPITOR) 40 MG Tab    aspirin 81 MG EC tablet    Cholecalciferol (VITAMIN D) 2000 UNIT Tab    omeprazole (PRILOSEC) 20 MG delayed-release capsule     Current Facility-Administered Medications   Medication Dose    onabotulinum toxin type A (Botox) injection 100 Units  100 Units     Allergies   Allergen Reactions    Pork Allergy Rash and Itching     itching             DATA / RESULTS     MRI C-spine 3/2023:  1.  Mild degenerative disease in the cervical spine as described above.  2.  There are multiple enlarged bilateral jugulodigastric and submandibular lymph nodes. The right submandibular lymph node measures an approximately 1.3 cm. This lymph nodes likely represents reactive lymphadenopathy. However, the possibility of   pathological lymphadenopathy cannot be excluded. Please correlate with the clinical examination.     MRI T-spine 3/2023  MRI of the thoracic spine without and with contrast within normal limits except for small central disc protrusion at T6-7 without spinal or neural foraminal stenosis..      Echocardiogram 2/2024:  Normal left ventricular systolic function.  Grade II diastolic dysfunction.  Moderately dilated left atrium.  Evidence of early (0-5 beats) right to left shunt suggestive of intracardiac shunt (ASD or PFO).     US Venous Leg 2/2024:   No acute thrombosis is identified.      MRI Brain 2/2024: reviewed and agree with report  1.  No acute abnormality.  2.  Mild chronic microvascular ischemic disease.  3.  Mild tonsillar ectopia.  4.  There has been no significant interval change.     CTA Neck 2/2024:  CT angiogram of the neck within normal limits.      CTA Head 2/2024:  1.  No evidence of intracranial vascular occlusion.   2.  Possible tiny 1 mm aneurysm emanating  "from the right middle cerebral artery distribution from an M2 branch.    MRI Lumbar 6/2024  1.  Mild discal and endplate degenerative changes at the L5-S1 level.   2.  Minimal annular bulging at the L5-S1 level which extends into the inferior aspects of the neural foramina bilaterally.    25-Hydroxy   Vitamin D 25   Date Value Ref Range Status   02/21/2024 27 (L) 30 - 100 ng/mL Final     Comment:     Adult Ranges:   <20 ng/mL - Deficiency  20-29 ng/mL - Insufficiency   ng/mL - Sufficiency  Electrochemiluminescence binding assay performed using Roche kelton e  immunoassay analyzer.  The Elecsys Vitamin D total II assay is intended for  the quantitative determination of total 25 hydroxyvitamin D in human serum  and plasma. This assay is to be used as an aid in the assessment of vitamin  D sufficiency in adults.       Vitamin B12 -True Cobalamin   Date Value Ref Range Status   02/21/2024 569 211 - 911 pg/mL Final     TSH   Date Value Ref Range Status   02/21/2024 1.270 0.380 - 5.330 uIU/mL Final     Comment:     The 2011 American Thyroid Association (JUANA) guidelines  recommended that the interpretation of thyroid function in  pregnancy be based on trimester specific reference ranges.    1st Trimester  0.100-2.500 mIU/L  2nd Trimester  0.200-3.000 mIU/L  3rd Trimester  0.300-3.500 mIU/L    These established reference ranges have not been validated  at [facility].       Glycohemoglobin   Date Value Ref Range Status   02/11/2019 5.2 4.0 - 5.6 % Final     LDL   Date Value Ref Range Status   05/30/2024 51 <100 mg/dL Final                OBJECTIVE      Vitals:    12/20/24 1009   BP: 110/76   BP Location: Left arm   Patient Position: Sitting   BP Cuff Size: Adult   Pulse: 67   Resp: 18   Temp: 36.5 °C (97.7 °F)   TempSrc: Temporal   SpO2: 97%   Weight: 79.2 kg (174 lb 9.7 oz)   Height: 1.549 m (5' 1\")       Physical Exam                 PROCEDURE     Botulinum Toxin Administration  Diagnosis: hemifacial " spasm  Procedure Date: 12/20/24    Botox 100 U were diluted in 1 ml of saline.   30 gauge needles.    INFORMED CONSENT   The risks and benefits of the procedure were explained to the patient, and all questions were answered. Adverse effects from toxin injection include but are not limited to: excessive weakness, infection, breathing and/or swallowing difficulty.  Common adverse effects from the injection itself are pain and bruising. The patient demonstrated good understanding of risks and benefits and consents to botulinum toxin injection.      Muscle Right Left Total # Units   Orbicularis oculi   2U x5  *diagram below 10   Pre-tarsal plate         Frontalis         Procerus - -              Nasalis         Zygomaticus   2U 2   Risorius   2U 2   Mentalis         Platysma            Physical Exam  Eyes:           Total units used: 14U  Total units wasted: 86U    Patient tolerated the procedure well, no complications.

## 2025-01-06 ENCOUNTER — TELEPHONE (OUTPATIENT)
Dept: CARDIOLOGY | Facility: MEDICAL CENTER | Age: 55
End: 2025-01-06
Payer: COMMERCIAL

## 2025-01-06 NOTE — LETTER
PROCEDURE/SURGERY CLEARANCE FORM      Encounter Date: 1/6/2025    Patient: Yadira Suarez  YOB: 1970    CARDIOLOGIST:  Satinder Carrero MD    REFERRING DOCTOR:  No ref. provider found    PROCEDURE/SURGERY CLEARANCE FORM    Date: 1/6/2025   Patient Name: Yadira Suarez    Dear Surgeon or Proceduralist,      Thank you for your request for cardiac stratification of our mutual patient Yadira Suarez 1970. We have reviewed their [place] records; and to the best of our understanding this patient has not had stenting, ablation, watchman, cardiothoracic surgery or hospitalization for cardiovascular reasons in the past 6 months.  Yadira Suarez has been seen within the past 15 months and is considered to have non-modifiable cardiac risk for this low-risk procedure/surgery. They may proceed from a cardiovascular standpoint and may hold their antiplatelet/anticoagulation as briefly as possible. Please have patient resume this medication when hemodynamically stable to do so.     Aspirin or Prasugrel   - hold 7 days prior to procedure/surgery, resume when hemodynamically stable      Clopidrogrel or Ticagrelor  - hold 7 days for all neurological procedures, hold 5 days prior to all other procedure/surgery,  resume when hemodynamically stable     Warfarin - hold 7 days for all neurological procedures, hold 5 days prior to all other procedure/surgery and coordinate with [facility] ([phone]) INR testing and dose management.      Pradaxa/Xarelto/Eliquis/Savesya - hold 1 day prior to procedure for low bleeding risk procedure, 2 days for high bleeding risk procedure, or consider holding 3 days or longer for patients with reduced kidney function (CrCl <30mL/min) or spinal/cranial surgeries/procedures.      If they have a mechanical heart valve, please coordinate with [facility] ([phone]) the proper management of their anticoagulant in the  periprocedural or perioperative period.      Some patients have higher risk for cardiovascular complications or holding medication. If our patient has had prior complications of holding antiplatelet or anticoagulants in the past and we have seen them after these events, we have addressed these concerns with the patient. They are at an unknown degree of increased risk for recurrent complication.  You may hold anticoagulation/antiplatelets for the procedure or surgery if the benefits of the procedure or surgery outweigh this nonmodifiable risk.      If Yadira Suarez 1970 has new symptoms of heart failure decompensation, unstable arrythmia, or angina please reach out and we will assess the patient.      If you have other patient-specific concerns, please feel free to reach out to the patient's cardiologist directly at [phone].     Thank you,       [facility]                   MD Bradley Carrero MD       I cannot release the above patient for the following procedure/surgery without a cardiology evaluation:                     Electronically signed            MD Bradley Carrero MD    Never

## 2025-01-06 NOTE — TELEPHONE ENCOUNTER
Last OV: 12/09/2024  Proposed Surgery: Colonoscopy w/ Biopsy  Surgery Date: 02/21/2025  Requesting Office Name: [facility]  Fax Number: [phone]  Preference of Location (default is surgery center unless specified by Cardiologist or ALYSSA)  Prior Clearance Addressed: No    Is this a general clearance? YES   Anticoags/Antiplatelets: Aspirin  Anticoags/Antiplatelet managed by Cardiology? YES    Outstanding Cardiac Imaging : No  Ablation, Cardioversion, Stent, Cardiac Devices, Catheterization, Watchman: No  TAVR/Valve, Mitral Clip, Watchman (including open heart),: N/A   Recent Cardiac Hospitalization: No            When: N/A  History (cardiac history):   Past Medical History:   Diagnosis Date    Anesthesia 07/25/2024    nausea, history of vertigo    Hypertension 07/25/2024    medicated    Infectious disease     MRSA/VRE - works in hospital    Other specified symptom associated with female genital organs     hysterectomy    Pain     Parathyroid disorder (HCC)     PONV (postoperative nausea and vomiting) 07/25/2024    history of vertigo    Snoring 07/25/2024    Stroke ([facility]) 07/25/2024 4/2024, no residual issues, left eye is smaller than right           Is this a dental clearance? NO  Ablation, Cardioversion, Watchman, Stents, Cath, Devices within the last 3 months? No   If yes- Send dental wait letter, do not forward to provider for review.     TAVR / Valve, Mitral clip within the last 6 months? No  If yes- Send dental wait letter, do not forward to provider for review.     If completing a general clearance, continue per protocol.           Surgical Clearance Letter Sent: YES   **Scan clearance request letter into [facility].**

## 2025-02-10 ENCOUNTER — PATIENT MESSAGE (OUTPATIENT)
Dept: ENDOCRINOLOGY | Facility: MEDICAL CENTER | Age: 55
End: 2025-02-10
Payer: COMMERCIAL

## 2025-02-10 ENCOUNTER — PATIENT MESSAGE (OUTPATIENT)
Dept: NEUROLOGY | Facility: MEDICAL CENTER | Age: 55
End: 2025-02-10
Payer: COMMERCIAL

## 2025-02-10 DIAGNOSIS — I67.1 CEREBRAL ANEURYSM: ICD-10-CM

## 2025-02-10 NOTE — PATIENT COMMUNICATION
Left pt vm to schedule appt. Left pt vm back on march 27th to schedule follow up but never got call back. Advised pt to call us back asap since Dr. Regalado is booking extremely far out.

## 2025-02-16 DIAGNOSIS — I10 PRIMARY HYPERTENSION: ICD-10-CM

## 2025-02-16 DIAGNOSIS — M81.0 AGE-RELATED OSTEOPOROSIS WITHOUT CURRENT PATHOLOGICAL FRACTURE: ICD-10-CM

## 2025-02-17 RX ORDER — ALENDRONATE SODIUM 70 MG/1
70 TABLET ORAL
Qty: 12 TABLET | Refills: 3 | Status: SHIPPED | OUTPATIENT
Start: 2025-02-17

## 2025-02-18 RX ORDER — ATORVASTATIN CALCIUM 40 MG/1
40 TABLET, FILM COATED ORAL EVERY EVENING
Qty: 100 TABLET | Refills: 3 | Status: SHIPPED | OUTPATIENT
Start: 2025-02-18

## 2025-02-18 RX ORDER — LOSARTAN POTASSIUM 50 MG/1
50 TABLET ORAL DAILY
Qty: 100 TABLET | Refills: 3 | Status: SHIPPED | OUTPATIENT
Start: 2025-02-18

## 2025-02-20 ENCOUNTER — HOSPITAL ENCOUNTER (OUTPATIENT)
Dept: RADIOLOGY | Facility: MEDICAL CENTER | Age: 55
End: 2025-02-20
Attending: INTERNAL MEDICINE
Payer: COMMERCIAL

## 2025-02-20 DIAGNOSIS — I67.1 CEREBRAL ANEURYSM: ICD-10-CM

## 2025-02-20 PROCEDURE — 70496 CT ANGIOGRAPHY HEAD: CPT

## 2025-02-20 PROCEDURE — 700117 HCHG RX CONTRAST REV CODE 255: Performed by: INTERNAL MEDICINE

## 2025-02-20 RX ADMIN — IOHEXOL 100 ML: 350 INJECTION, SOLUTION INTRAVENOUS at 12:34

## 2025-02-21 ENCOUNTER — RESULTS FOLLOW-UP (OUTPATIENT)
Dept: NEUROLOGY | Facility: MEDICAL CENTER | Age: 55
End: 2025-02-21

## 2025-03-04 PROCEDURE — RXMED WILLOW AMBULATORY MEDICATION CHARGE: Performed by: INTERNAL MEDICINE

## 2025-03-07 ENCOUNTER — HOSPITAL ENCOUNTER (OUTPATIENT)
Dept: LAB | Facility: MEDICAL CENTER | Age: 55
End: 2025-03-07
Attending: INTERNAL MEDICINE
Payer: COMMERCIAL

## 2025-03-07 DIAGNOSIS — M81.0 AGE-RELATED OSTEOPOROSIS WITHOUT CURRENT PATHOLOGICAL FRACTURE: ICD-10-CM

## 2025-03-07 DIAGNOSIS — Z90.89 S/P PARATHYROIDECTOMY: ICD-10-CM

## 2025-03-07 DIAGNOSIS — E55.9 VITAMIN D DEFICIENCY: ICD-10-CM

## 2025-03-07 DIAGNOSIS — Z98.890 S/P PARATHYROIDECTOMY: ICD-10-CM

## 2025-03-07 DIAGNOSIS — E78.5 DYSLIPIDEMIA: ICD-10-CM

## 2025-03-07 PROCEDURE — 82306 VITAMIN D 25 HYDROXY: CPT

## 2025-03-07 PROCEDURE — 80053 COMPREHEN METABOLIC PANEL: CPT

## 2025-03-07 PROCEDURE — 82523 COLLAGEN CROSSLINKS: CPT

## 2025-03-07 PROCEDURE — 36415 COLL VENOUS BLD VENIPUNCTURE: CPT

## 2025-03-07 PROCEDURE — 84100 ASSAY OF PHOSPHORUS: CPT

## 2025-03-07 PROCEDURE — 80048 BASIC METABOLIC PNL TOTAL CA: CPT

## 2025-03-07 PROCEDURE — 83970 ASSAY OF PARATHORMONE: CPT

## 2025-03-08 LAB
25(OH)D3 SERPL-MCNC: 38 NG/ML (ref 30–100)
ALBUMIN SERPL BCP-MCNC: 4.1 G/DL (ref 3.2–4.9)
ALBUMIN/GLOB SERPL: 1.4 G/DL
ALP SERPL-CCNC: 87 U/L (ref 30–99)
ALT SERPL-CCNC: 24 U/L (ref 2–50)
ANION GAP SERPL CALC-SCNC: 10 MMOL/L (ref 7–16)
ANION GAP SERPL CALC-SCNC: 12 MMOL/L (ref 7–16)
AST SERPL-CCNC: 19 U/L (ref 12–45)
BILIRUB SERPL-MCNC: 0.7 MG/DL (ref 0.1–1.5)
BUN SERPL-MCNC: 17 MG/DL (ref 8–22)
BUN SERPL-MCNC: 18 MG/DL (ref 8–22)
CALCIUM ALBUM COR SERPL-MCNC: 9.4 MG/DL (ref 8.5–10.5)
CALCIUM SERPL-MCNC: 9.5 MG/DL (ref 8.5–10.5)
CALCIUM SERPL-MCNC: 9.5 MG/DL (ref 8.5–10.5)
CHLORIDE SERPL-SCNC: 105 MMOL/L (ref 96–112)
CHLORIDE SERPL-SCNC: 106 MMOL/L (ref 96–112)
CO2 SERPL-SCNC: 24 MMOL/L (ref 20–33)
CO2 SERPL-SCNC: 25 MMOL/L (ref 20–33)
CREAT SERPL-MCNC: 0.82 MG/DL (ref 0.5–1.4)
CREAT SERPL-MCNC: 0.83 MG/DL (ref 0.5–1.4)
GFR SERPLBLD CREATININE-BSD FMLA CKD-EPI: 84 ML/MIN/1.73 M 2
GFR SERPLBLD CREATININE-BSD FMLA CKD-EPI: 85 ML/MIN/1.73 M 2
GLOBULIN SER CALC-MCNC: 3 G/DL (ref 1.9–3.5)
GLUCOSE SERPL-MCNC: 93 MG/DL (ref 65–99)
GLUCOSE SERPL-MCNC: 95 MG/DL (ref 65–99)
PHOSPHATE SERPL-MCNC: 3.3 MG/DL (ref 2.5–4.5)
POTASSIUM SERPL-SCNC: 4.2 MMOL/L (ref 3.6–5.5)
POTASSIUM SERPL-SCNC: 4.3 MMOL/L (ref 3.6–5.5)
PROT SERPL-MCNC: 7.1 G/DL (ref 6–8.2)
PTH-INTACT SERPL-MCNC: 41.5 PG/ML (ref 14–72)
SODIUM SERPL-SCNC: 141 MMOL/L (ref 135–145)
SODIUM SERPL-SCNC: 141 MMOL/L (ref 135–145)

## 2025-03-10 ENCOUNTER — RESULTS FOLLOW-UP (OUTPATIENT)
Dept: CARDIOLOGY | Facility: MEDICAL CENTER | Age: 55
End: 2025-03-10
Payer: COMMERCIAL

## 2025-03-10 ENCOUNTER — TELEPHONE (OUTPATIENT)
Dept: NEUROLOGY | Facility: MEDICAL CENTER | Age: 55
End: 2025-03-10
Payer: COMMERCIAL

## 2025-03-10 LAB — COLLAGEN CTX SERPL-MCNC: 240 PG/ML

## 2025-03-10 NOTE — TELEPHONE ENCOUNTER
NEUROLOGY PATIENT PRE-VISIT PLANNING     Patient was NOT contacted to complete PVP.  Note: Patient will not be contacted if there is no indication to call.     Patient Appointment is scheduled as: Established Patient     Is visit type and length scheduled correctly? Yes    [employer] Patient is checked in Patient Demographics? Yes    3.   Is referral attached to visit? Yes    4. Were records received from referring provider? Yes    4. Patient was NOT contacted to have someone accompany them to visit.     5. Is this appointment scheduled as a Hospital Follow-Up?  No    6. Does the patient require any pre procedure or post procedure follow up? No    7. If any orders were placed at last visit or intended to be done for this visit do we have Results/Consult Notes? No  Labs - Labs were not ordered at last office visit.  Imaging - Imaging was not ordered at last office visit.  Referrals - No referrals were ordered at last office visit.  Note: If patient appointment is for lab or imaging review and patient did not complete the studies, check with provider if OK to reschedule patient until completed.    8. If patient appointment is for Botox - is order pended for provider? NO per Dr. Hernández not to pend Botox order    9. Was Plan Assessment from last Neurology Office Visit Reviewed?  Yes

## 2025-03-14 ENCOUNTER — OFFICE VISIT (OUTPATIENT)
Dept: NEUROLOGY | Facility: MEDICAL CENTER | Age: 55
End: 2025-03-14
Attending: INTERNAL MEDICINE
Payer: COMMERCIAL

## 2025-03-14 VITALS
OXYGEN SATURATION: 98 % | WEIGHT: 175.71 LBS | HEIGHT: 61 IN | TEMPERATURE: 98.3 F | BODY MASS INDEX: 33.17 KG/M2 | SYSTOLIC BLOOD PRESSURE: 122 MMHG | RESPIRATION RATE: 14 BRPM | HEART RATE: 78 BPM | DIASTOLIC BLOOD PRESSURE: 74 MMHG

## 2025-03-14 DIAGNOSIS — G51.32 HEMIFACIAL SPASM OF LEFT SIDE OF FACE: ICD-10-CM

## 2025-03-14 PROCEDURE — 99213 OFFICE O/P EST LOW 20 MIN: CPT | Mod: 25 | Performed by: INTERNAL MEDICINE

## 2025-03-14 PROCEDURE — 700101 HCHG RX REV CODE 250

## 2025-03-14 PROCEDURE — 64612 DESTROY NERVE FACE MUSCLE: CPT | Mod: 50 | Performed by: INTERNAL MEDICINE

## 2025-03-14 PROCEDURE — 64615 CHEMODENERV MUSC MIGRAINE: CPT | Performed by: INTERNAL MEDICINE

## 2025-03-14 PROCEDURE — 64612 DESTROY NERVE FACE MUSCLE: CPT

## 2025-03-14 PROCEDURE — 3078F DIAST BP <80 MM HG: CPT | Performed by: INTERNAL MEDICINE

## 2025-03-14 PROCEDURE — 700111 HCHG RX REV CODE 636 W/ 250 OVERRIDE (IP): Mod: JZ

## 2025-03-14 PROCEDURE — 3074F SYST BP LT 130 MM HG: CPT | Performed by: INTERNAL MEDICINE

## 2025-03-14 RX ADMIN — SODIUM CHLORIDE 100 UNITS: 9 INJECTION, SOLUTION INTRAMUSCULAR; INTRAVENOUS; SUBCUTANEOUS at 12:47

## 2025-03-14 ASSESSMENT — PATIENT HEALTH QUESTIONNAIRE - PHQ9: CLINICAL INTERPRETATION OF PHQ2 SCORE: 0

## 2025-03-14 ASSESSMENT — FIBROSIS 4 INDEX: FIB4 SCORE: 0.89

## 2025-03-14 NOTE — PROGRESS NOTES
Dillan Hernández,   Neurology, Movement Disorders [facility]  [address], [address]. [place] [address]  Phone: [phone], Fax: [phone]     ASSESSMENT / PLAN   Yadira Suarez is a 54 y.o. RHD female presenting for tremors and facial weakness     LEFT hemifacial spasm  Onset 2018. MRI Brain 2/2024 unremarkable for brain stem or facial nerve findings.   Exam notable for left obicularis oculi and trace zygomaticus and risorius spasms  Consider repeating cranial nerve protocol with contrast in future.  PLAN  - Repeat Botox 100U every 3 months for hemifacial spasm    LEFT Bell's palsy  Onset 1/2024, improving. Likely etiology of the left hemifacial spasm  Stroke workup for 2/2024 episode of left facial weakness. Isolated left facial weakness with normal MRI Brain makes TIA unlikely.   - THANG completed 8/2024 showed small PFO (bubbles seen, but PFO not visualized), not candidate for closure. Dilated left atrium noted.   - US Venous LE 9/2024: no DVT, normal veins.   - Heart monitor (zio patch) 2019 with Dr. Cassidy in [facility] (notes in CareEverywhere)  - Repeat 12/2024: Short runs of supraventricular tachycardia that do not meet criteria for diagnosis of atrial flutter or atrial fibrillation.   PLAN  - Per cardiology: continue atorvastatin 40mg, losartan, aspirin 81mg.    Bilateral leg tremors  Working diagnosis as orthostatic tremor: worse with standing, improved when walking  - consider magnesium threonate, 200-400mg  - gabapentin 100mg, 1 capsule, 2-3x/day     Dizziness  - consider ENT/vestibular testing if not improving with continued PT. 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 Possible slight growth of 1-2 mm right middle cerebral artery bifurcation aneurysm compared with 2/21/2024. Difficult to confirm any true change and the aneurysm remains quite small. Continued follow-up recommended.   2. CT head without  contrast is normal.  - plan to repeat in 1/2026, then space out every 2-5 years afterwards.     Orders Placed This Encounter    Magnesium 100 MG Tab     Return in about 3 months (around 6/14/2025) for Botox no ultrasound.    BILLING DOCUMENTATION:   99478 Face and modifier -50 bilateral              HISTORY OF PRESENT ILLNESS   Yadira Suarez is a 54 y.o. RHD female presenting for tremors and facial weakness     PMHx: total hysterectomy, ovarian cancer, hemifacial spasm, hyperparathyroidism status post parathyroidectomy 2019  Working as a RN     Initial HPI 02/29/24  Prior neurologist:   - Dr. Vivien Juares at [place]  - Dr. Schumacher at [facility] [place]  - Dr. Patel at [facility]     Left blepharospasm  Left blepharospasm since 2018: left obicularis oculi and rhizorius regions spasm. No preceding injury or rash, no prior episodes of bell's palsy before 2024 episode.   Dr. Schumacher did botox x3 sessions. Left face, 30U total.  - bothersome at work, interested in starting injections again    Left facial weakness  Facial weakness improving 90%  2/21/2024: while at work, had slurred speech. Left facial droop thought to be secondary to Bell's palsy. Discharged with the following changes:   Aspirin 81mg  Atorvastatin 40mg  Losartan 25mg --> 50mg      Vision change  2018: left monocular left visual field loss, thought to be lacunar stroke. MRI negative for acute stroke. Since then, visual field has improved close to baseline. Retinal testing normal. Optometry exam 9/2023 notes reviewed: VF testing normal. Normal fundus exam     Leg shakiness  Parathyroid surgery 2019. Progressively felt weaker since then.      2021: leg weakness and shakiness. Muscle aches worsening. Last time active with crossfit, hiking, running 50+ miles in 2022. Weight change 50+ in past year. Shakiness worse with prolonged standing. Some pain going sitting to stand, radiating from waist and down. 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 Prior neurologist thought to be orthostatic tremor but didn't start medication to treat (thought to be antiseizure medication)  - woke up this morning, \"like nervous feeling\". Less severe sitting to stand when in afternoon. Active movement suppresses it.   - deep achy discomfort from waist down  - endurance better: doing peloton  - wine didn't worsen it or improve it     Recent right leg injury when lifting at work, resulting in lateral paresthesias, lateral foot, and underside of foot  - Better now     Dizziness  Valsalva sneezing, coughing,  causes \"motion\" side to side. Last happened on Friday 2/24/2024. Frequency approx 1/month. No headaches associated. Unclear duration of episode. Loud sounds doesn't trigger it. No vestibular testing in past.       Interval history  02/29/24: First visit with me.  05/30/24: Start gabapentin and magnesium  12/20/24: Botox #1: Sfx: none. Duration: nearly 3m.  03/14/25: Botox #2        Past Medical History:   Diagnosis Date    Anesthesia 07/25/2024    nausea, history of vertigo    Hypertension 07/25/2024    medicated    Infectious disease     MRSA/VRE - works in hospital    Other specified symptom associated with female genital organs     hysterectomy    Pain     Parathyroid disorder (HCC)     PONV (postoperative nausea and vomiting) 07/25/2024    history of vertigo    Snoring 07/25/2024    Stroke ([facility]) 07/25/2024 4/2024, no residual issues, left eye is smaller than right     Past Surgical History:   Procedure Laterality Date    PARATHYROIDECTOMY Right 05/02/2019    ABDOMINOPLASTY  12/11/2013    Performed by Sherrie Knott M.D. at SURGERY [facility] ORS    FAITH BY LAPAROSCOPY  9/27/2012    Performed by Jana Henriquez M.D. at SURGERY [facility] ORS    PELVISCOPY  9/16/2010    Performed by LALY FALLON at SURGERY [facility] ORS    ABDOMINAL HYSTERECTOMY TOTAL  2006    fibroid removal      Family History   Problem Relation Age of Onset    Diabetes " Mother         diet controlled    Hypertension Father     Heart Disease Father         MI age 61    Cancer Father         prostate ca    Cancer Sister         ovarian lesion    Heart Disease Brother         blood clot heart     Social History     Socioeconomic History    Marital status:      Spouse name: Federico    Number of children: Not on file    Years of education: Not on file    Highest education level: Bachelor's degree (e.g., BA, AB, BS)   Occupational History    Occupation: ER      Employer: [employer]   Tobacco Use    Smoking status: Never    Smokeless tobacco: Never   Vaping Use    Vaping status: Never Used   Substance and Sexual Activity    Alcohol use: Not Currently     Comment: OCC (Holidays)    Drug use: No    Sexual activity: Yes     Partners: Male     Birth control/protection: Surgical   Other Topics Concern    Not on file   Social History Narrative    Not on file     Social Drivers of Health     Financial Resource Strain: Low Risk  (9/24/2024)    Overall Financial Resource Strain (CARDIA)     Difficulty of Paying Living Expenses: Not hard at all   Food Insecurity: No Food Insecurity (9/24/2024)    Hunger Vital Sign     Worried About Running Out of Food in the Last Year: Never true     Ran Out of Food in the Last Year: Never true   Transportation Needs: No Transportation Needs (9/24/2024)    PRAPARE - Transportation     Lack of Transportation (Medical): No     Lack of Transportation (Non-Medical): No   Physical Activity: Insufficiently Active (9/24/2024)    Exercise Vital Sign     Days of Exercise per Week: 4 days     Minutes of Exercise per Session: 30 min   Stress: No Stress Concern Present (9/24/2024)    [place] [employer] - Occupational Stress Questionnaire     Feeling of Stress : Only a little   Social Connections: Moderately Integrated (9/24/2024)    Social Connection and Isolation Panel [NHANES]     Frequency of Communication with Friends and Family: Once a week      Frequency of Social Gatherings with Friends and Family: Once a week     Attends Yazidism Services: More than 4 times per year     Active Member of Clubs or Organizations: Yes     Attends Club or Organization Meetings: More than 4 times per year     Marital Status:    Intimate Partner Violence: Not on file   Housing Stability: Unknown (9/24/2024)    Housing Stability Vital Sign     Unable to Pay for Housing in the Last Year: No     Number of Times Moved in the Last Year: Not on file     Homeless in the Last Year: No     Current Outpatient Medications   Medication    Magnesium 100 MG Tab    Zoster Vac Recomb Adjuvanted (SHINGRIX) 50 MCG/0.5ML Recon Susp    atorvastatin (LIPITOR) 40 MG Tab    losartan (COZAAR) 50 MG Tab    alendronate (FOSAMAX) 70 MG Tab    aspirin 81 MG EC tablet    Cholecalciferol (VITAMIN D) 2000 UNIT Tab    omeprazole (PRILOSEC) 20 MG delayed-release capsule     Current Facility-Administered Medications   Medication Dose    onabotulinum toxin type A (Botox) injection 100 Units  100 Units     Allergies   Allergen Reactions    Pork Allergy Rash and Itching     itching             DATA / RESULTS     MRI C-spine 3/2023:  1.  Mild degenerative disease in the cervical spine as described above.  2.  There are multiple enlarged bilateral jugulodigastric and submandibular lymph nodes. The right submandibular lymph node measures an approximately 1.3 cm. This lymph nodes likely represents reactive lymphadenopathy. However, the possibility of   pathological lymphadenopathy cannot be excluded. Please correlate with the clinical examination.     MRI T-spine 3/2023  MRI of the thoracic spine without and with contrast within normal limits except for small central disc protrusion at T6-7 without spinal or neural foraminal stenosis..      Echocardiogram 2/2024:  Normal left ventricular systolic function.  Grade II diastolic dysfunction.  Moderately dilated left atrium.  Evidence of early (0-5 beats) right to  left shunt suggestive of intracardiac shunt (ASD or PFO).     US Venous Leg 2/2024:   No acute thrombosis is identified.      MRI Brain 2/2024: reviewed and agree with report  1.  No acute abnormality.  2.  Mild chronic microvascular ischemic disease.  3.  Mild tonsillar ectopia.  4.  There has been no significant interval change.     CTA Neck 2/2024:  CT angiogram of the neck within normal limits.      CTA Head 2/2024:  1.  No evidence of intracranial vascular occlusion.   2.  Possible tiny 1 mm aneurysm emanating from the right middle cerebral artery distribution from an M2 branch.    MRI Lumbar 6/2024  1.  Mild discal and endplate degenerative changes at the L5-S1 level.   2.  Minimal annular bulging at the L5-S1 level which extends into the inferior aspects of the neural foramina bilaterally.    25-Hydroxy   Vitamin D 25   Date Value Ref Range Status   03/07/2025 38 30 - 100 ng/mL Final     Comment:     Adult Ranges:   <20 ng/mL - Deficiency  20-29 ng/mL - Insufficiency   ng/mL - Sufficiency  Electrochemiluminescence binding assay performed using Roche kelton e  immunoassay analyzer.  The Elecsys Vitamin D total II assay is intended for  the quantitative determination of total 25 hydroxyvitamin D in human serum  and plasma. This assay is to be used as an aid in the assessment of vitamin  D sufficiency in adults.       Vitamin B12 -True Cobalamin   Date Value Ref Range Status   02/21/2024 569 211 - 911 pg/mL Final     TSH   Date Value Ref Range Status   02/21/2024 1.270 0.380 - 5.330 uIU/mL Final     Comment:     The 2011 American Thyroid Association (JUANA) guidelines  recommended that the interpretation of thyroid function in  pregnancy be based on trimester specific reference ranges.    1st Trimester  0.100-2.500 mIU/L  2nd Trimester  0.200-3.000 mIU/L  3rd Trimester  0.300-3.500 mIU/L    These established reference ranges have not been validated  at [employer].       Glycohemoglobin   Date  "Value Ref Range Status   02/11/2019 5.2 4.0 - 5.6 % Final     LDL   Date Value Ref Range Status   05/30/2024 51 <100 mg/dL Final                OBJECTIVE      Vitals:    03/14/25 1102   BP: 122/74   BP Location: Left arm   Patient Position: Sitting   BP Cuff Size: Adult   Pulse: 78   Resp: 14   Temp: 36.8 °C (98.3 °F)   TempSrc: Temporal   SpO2: 98%   Weight: 79.7 kg (175 lb 11.3 oz)   Height: 1.549 m (5' 1\")         Physical Exam  Left obicularis oculi, zygomaticus, and risorius spasms          PROCEDURE       Botulinum Toxin Administration  Diagnosis: hemifacial spasm  Procedure Date: 03/14/25    INFORMED CONSENT   The risks and benefits of the procedure were explained to the patient, and all questions were answered. Adverse effects from toxin injection include but are not limited to: excessive weakness, infection, breathing and/or swallowing difficulty.  Common adverse effects from the injection itself are pain and bruising. The patient demonstrated good understanding of risks and benefits and consents to botulinum toxin injection.     Botox 100 U were diluted in 1 ml of saline  30 gauge needles     Muscle Right Left Total # Units   Orbicularis oculi   2U x7  *diagram below 14   Pre-tarsal plate         Frontalis         Procerus - -              Nasalis         Zygomaticus   2U 2   Risorius 1U   For symmetry 2U 3   Mentalis         Platysma            Physical Exam  Eyes:           Total units used: 19U  Total units wasted: 81U    Patient tolerated the procedure well, no complications.   "

## 2025-03-18 DIAGNOSIS — G51.32 HEMIFACIAL SPASM OF LEFT SIDE OF FACE: ICD-10-CM

## 2025-04-04 ENCOUNTER — HOSPITAL ENCOUNTER (OUTPATIENT)
Dept: RADIOLOGY | Facility: MEDICAL CENTER | Age: 55
End: 2025-04-04
Attending: STUDENT IN AN ORGANIZED HEALTH CARE EDUCATION/TRAINING PROGRAM
Payer: COMMERCIAL

## 2025-04-04 DIAGNOSIS — Z12.31 ENCOUNTER FOR SCREENING MAMMOGRAM FOR MALIGNANT NEOPLASM OF BREAST: ICD-10-CM

## 2025-04-04 PROCEDURE — 77067 SCR MAMMO BI INCL CAD: CPT

## 2025-04-08 ENCOUNTER — RESULTS FOLLOW-UP (OUTPATIENT)
Dept: MEDICAL GROUP | Facility: LAB | Age: 55
End: 2025-04-08

## 2025-04-17 ENCOUNTER — TELEPHONE (OUTPATIENT)
Dept: MEDICAL GROUP | Facility: LAB | Age: 55
End: 2025-04-17
Payer: COMMERCIAL

## 2025-04-17 DIAGNOSIS — L98.9 SKIN LESION: ICD-10-CM

## 2025-04-17 NOTE — TELEPHONE ENCOUNTER
Thank you very much. Can I get a referral to a dermatologist I have this small small round bump on my nose, help with pigmentation, and large pores.   Thanks

## 2025-04-18 ENCOUNTER — PATIENT MESSAGE (OUTPATIENT)
Dept: MEDICAL GROUP | Facility: LAB | Age: 55
End: 2025-04-18
Payer: COMMERCIAL

## 2025-04-18 ENCOUNTER — TELEPHONE (OUTPATIENT)
Dept: MEDICAL GROUP | Facility: LAB | Age: 55
End: 2025-04-18
Payer: COMMERCIAL

## 2025-04-18 DIAGNOSIS — E66.9 OBESITY (BMI 30-39.9): ICD-10-CM

## 2025-04-18 NOTE — TELEPHONE ENCOUNTER
MEDICATION PRIOR AUTHORIZATION NEEDED:    1. Name of Medication: Wegovy 0.25MG/0.5ML auto-injectors    2. Requested By (Name of [facility]acy): [facility]     3. Is insurance on file current? yes    4. What is the name & phone number of the 3rd party payor? (Key: [id number])     Please indicate the patient’s previous medication history in a free text field (if available) for any of the following medications:  Benzphetamine (Didrex)  Bupropion-naltrexone (Contrave)  Diethylpropion (Tenuate)  Liraglutide (Saxenda)  Lorcaserin (Belviq)  Orlistat (Charli, Xenical)  Phendimetrazine  Phentermine (Adipex-P)  Phentermine-topiramate (Qsymia)  Other (Please specify other

## 2025-04-23 NOTE — Clinical Note
REFERRAL APPROVAL NOTICE         Sent on April 23, 2025                   Alexandra Suarez  [address] [facility] [address]                   Dear Ms. Suarez,    After a careful review of the medical information and benefit coverage, [facility] has processed your referral. See below for additional details.    If applicable, you must be actively enrolled with your insurance for coverage of the authorized service. If you have any questions regarding your coverage, please contact your insurance directly.    REFERRAL INFORMATION   Referral #:  [id number]  Referred-To Provider    Referred-By Provider:  Dermatology    Librado Villanueva D.O.   [facility] [place]      [address] [place] [address] [address] [place] [id number]  [phone] [address]  [facility] [address]  [phone]    Referral Start Date:  04/17/2025  Referral End Date:   04/17/2026             SCHEDULING  If you do not already have an appointment, please call [phone] to make an appointment.     MORE INFORMATION  If you do not already have a [employer] account, sign up at: [email or web address]  You can access your medical information, make appointments, see lab results, billing information, and more.  If you have questions regarding this referral, please contact  the [facility] Referrals department at:             [phone]. Monday - Friday 8:00AM - 5:00PM.     Sincerely,    [facility]

## 2025-04-29 NOTE — TELEPHONE ENCOUNTER
Nereyda is unable to respond with clinical questions. Please see more information at the bottom of the page for next steps.

## 2025-05-01 ENCOUNTER — PHARMACY VISIT (OUTPATIENT)
Dept: PHARMACY | Facility: MEDICAL CENTER | Age: 55
End: 2025-05-01
Payer: MEDICARE

## 2025-05-02 ENCOUNTER — APPOINTMENT (OUTPATIENT)
Dept: ENDOCRINOLOGY | Facility: MEDICAL CENTER | Age: 55
End: 2025-05-02
Attending: INTERNAL MEDICINE
Payer: COMMERCIAL

## 2025-05-05 ENCOUNTER — PHARMACY VISIT (OUTPATIENT)
Dept: PHARMACY | Facility: MEDICAL CENTER | Age: 55
End: 2025-05-05
Payer: MEDICARE

## 2025-05-05 PROCEDURE — RXMED WILLOW AMBULATORY MEDICATION CHARGE: Performed by: INTERNAL MEDICINE

## 2025-05-05 RX ORDER — ZOSTER VACCINE RECOMBINANT, ADJUVANTED 50 MCG/0.5
KIT INTRAMUSCULAR
Qty: 0.5 ML | Refills: 0 | OUTPATIENT
Start: 2025-05-05

## 2025-05-09 ENCOUNTER — PATIENT MESSAGE (OUTPATIENT)
Dept: ENDOCRINOLOGY | Facility: MEDICAL CENTER | Age: 55
End: 2025-05-09
Payer: COMMERCIAL

## 2025-06-10 ENCOUNTER — APPOINTMENT (OUTPATIENT)
Dept: URBAN - METROPOLITAN AREA CLINIC 22 | Facility: CLINIC | Age: 55
Setting detail: DERMATOLOGY
End: 2025-06-10

## 2025-06-10 DIAGNOSIS — L81.4 OTHER MELANIN HYPERPIGMENTATION: ICD-10-CM

## 2025-06-10 DIAGNOSIS — K13.0 DISEASES OF LIPS: ICD-10-CM

## 2025-06-10 PROBLEM — D23.39 OTHER BENIGN NEOPLASM OF SKIN OF OTHER PARTS OF FACE: Status: ACTIVE | Noted: 2025-06-10

## 2025-06-10 PROCEDURE — ? COUNSELING

## 2025-06-10 PROCEDURE — ? PRESCRIPTION

## 2025-06-10 PROCEDURE — ? ADDITIONAL NOTES

## 2025-06-10 PROCEDURE — ? SUNSCREEN RECOMMENDATIONS

## 2025-06-10 RX ORDER — DESONIDE OINTMENT, 0.05% 0.5 MG/G
OINTMENT TOPICAL BID
Qty: 15 | Refills: 1 | Status: ERX | COMMUNITY
Start: 2025-06-10

## 2025-06-10 RX ORDER — TACROLIMUS 1 MG/G
OINTMENT TOPICAL
Qty: 30 | Refills: 2 | Status: ERX | COMMUNITY
Start: 2025-06-10

## 2025-06-10 RX ADMIN — DESONIDE OINTMENT, 0.05%: 0.5 OINTMENT TOPICAL at 00:00

## 2025-06-10 RX ADMIN — TACROLIMUS: 1 OINTMENT TOPICAL at 00:00

## 2025-06-10 ASSESSMENT — LOCATION SIMPLE DESCRIPTION DERM
LOCATION SIMPLE: LEFT CHEEK
LOCATION SIMPLE: RIGHT CHEEK

## 2025-06-10 ASSESSMENT — LOCATION ZONE DERM: LOCATION ZONE: FACE

## 2025-06-10 ASSESSMENT — LOCATION DETAILED DESCRIPTION DERM
LOCATION DETAILED: LEFT INFERIOR CENTRAL MALAR CHEEK
LOCATION DETAILED: RIGHT INFERIOR CENTRAL MALAR CHEEK

## 2025-06-19 ENCOUNTER — HOSPITAL ENCOUNTER (OUTPATIENT)
Dept: RADIOLOGY | Facility: MEDICAL CENTER | Age: 55
End: 2025-06-19
Attending: SURGERY
Payer: COMMERCIAL

## 2025-06-19 DIAGNOSIS — M54.50 LUMBAR PAIN: ICD-10-CM

## 2025-06-19 PROCEDURE — 72110 X-RAY EXAM L-2 SPINE 4/>VWS: CPT

## 2025-06-26 ENCOUNTER — TELEPHONE (OUTPATIENT)
Dept: NEUROLOGY | Facility: MEDICAL CENTER | Age: 55
End: 2025-06-26
Payer: COMMERCIAL

## 2025-06-26 NOTE — TELEPHONE ENCOUNTER

## 2025-06-27 ENCOUNTER — APPOINTMENT (OUTPATIENT)
Dept: NEUROLOGY | Facility: MEDICAL CENTER | Age: 55
End: 2025-06-27
Attending: INTERNAL MEDICINE
Payer: COMMERCIAL

## 2025-06-27 NOTE — PROGRESS NOTES
Dillan Hernández,   Neurology, Movement Disorders [facility]  [address], [address]. [place] [address]  Phone: [phone], Fax: [phone]     ASSESSMENT / PLAN   Yadira Suarez is a 54 y.o. RHD female presenting for tremors and facial weakness     LEFT hemifacial spasm  Onset 2018. MRI Brain 2/2024 unremarkable for brain stem or facial nerve findings.   Exam notable for left obicularis oculi and trace zygomaticus and risorius spasms  Consider repeating cranial nerve protocol with contrast in future.  PLAN  - Repeat Botox 100U every 3 months for hemifacial spasm    LEFT Bell's palsy  Onset 1/2024, improving. Likely etiology of the left hemifacial spasm  Stroke workup for 2/2024 episode of left facial weakness. Isolated left facial weakness with normal MRI Brain makes TIA unlikely.   - THANG completed 8/2024 showed small PFO (bubbles seen, but PFO not visualized), not candidate for closure. Dilated left atrium noted.   - US Venous LE 9/2024: no DVT, normal veins.   - Heart monitor (zio patch) 2019 with Dr. Cassidy in [facility] (notes in CareEverywhere)  - Repeat 12/2024: Short runs of supraventricular tachycardia that do not meet criteria for diagnosis of atrial flutter or atrial fibrillation.   PLAN  - Per cardiology: continue atorvastatin 40mg, losartan, aspirin 81mg.    Bilateral leg tremors  Working diagnosis as orthostatic tremor: worse with standing, improved when walking  - consider magnesium threonate, 200-400mg  - gabapentin 100mg, 1 capsule, 2-3x/day     Dizziness  - consider ENT/vestibular testing if not improving with continued PT. 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 Possible slight growth of 1-2 mm right middle cerebral artery bifurcation aneurysm compared with 2/21/2024. Difficult to confirm any true change and the aneurysm remains quite small. Continued follow-up recommended.   2. CT head without  contrast is normal.  - plan to repeat in 1/2026, then space out every 2-5 years afterwards.     No orders of the defined types were placed in this encounter.    No follow-ups on file.    BILLING DOCUMENTATION:   51496 Face and modifier -50 bilateral              HISTORY OF PRESENT ILLNESS   Yadira Suarez is a 54 y.o. RHD female presenting for tremors and facial weakness     PMHx: total hysterectomy, ovarian cancer, hemifacial spasm, hyperparathyroidism status post parathyroidectomy 2019  Working as a RN     Initial HPI 02/29/24  Prior neurologist:   - Dr. Vivien Juares at [facility]  - Dr. Schumacher at [facility], [place]  - Dr. Patel at [facility]     Left blepharospasm  Left blepharospasm since 2018: left obicularis oculi and rhizorius regions spasm. No preceding injury or rash, no prior episodes of bell's palsy before 2024 episode.   Dr. Schumacher did botox x3 sessions. Left face, 30U total.  - bothersome at work, interested in starting injections again    Left facial weakness  Facial weakness improving 90%  2/21/2024: while at work, had slurred speech. Left facial droop thought to be secondary to Bell's palsy. Discharged with the following changes:   Aspirin 81mg  Atorvastatin 40mg  Losartan 25mg --> 50mg      Vision change  2018: left monocular left visual field loss, thought to be lacunar stroke. MRI negative for acute stroke. Since then, visual field has improved close to baseline. Retinal testing normal. Optometry exam 9/2023 notes reviewed: VF testing normal. Normal fundus exam     Leg shakiness  Parathyroid surgery 2019. Progressively felt weaker since then.      2021: leg weakness and shakiness. Muscle aches worsening. Last time active with crossfit, hiking, running 50+ miles in 2022. Weight change 50+ in past year. Shakiness worse with prolonged standing. Some pain going sitting to stand, radiating from waist and down. 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 Prior neurologist thought to  "be orthostatic tremor but didn't start medication to treat (thought to be antiseizure medication)  - woke up this morning, \"like nervous feeling\". Less severe sitting to stand when in afternoon. Active movement suppresses it.   - deep achy discomfort from waist down  - endurance better: doing peloton  - wine didn't worsen it or improve it     Recent right leg injury when lifting at work, resulting in lateral paresthesias, lateral foot, and underside of foot  - Better now     Dizziness  Valsalva sneezing, coughing,  causes \"motion\" side to side. Last happened on Friday 2/24/2024. Frequency approx 1/month. No headaches associated. Unclear duration of episode. Loud sounds doesn't trigger it. No vestibular testing in past.       Interval history  02/29/24: First visit with me.  05/30/24: Start gabapentin and magnesium  12/20/24: Botox #1: Sfx: none. Duration: nearly 3m.  03/14/25: Botox #2        Past Medical History:   Diagnosis Date    Anesthesia 07/25/2024    nausea, history of vertigo    Hypertension 07/25/2024    medicated    Infectious disease     MRSA/VRE - works in hospital    Other specified symptom associated with female genital organs     hysterectomy    Pain     Parathyroid disorder (HCC)     PONV (postoperative nausea and vomiting) 07/25/2024    history of vertigo    Snoring 07/25/2024    Stroke (HCC) 07/25/2024 4/2024, no residual issues, left eye is smaller than right     Past Surgical History:   Procedure Laterality Date    PARATHYROIDECTOMY Right 05/02/2019    ABDOMINOPLASTY  12/11/2013    Performed by Sherrie Knott M.D. at SURGERY [facility] ORS    FAITH BY LAPAROSCOPY  9/27/2012    Performed by Jana Henriquez M.D. at SURGERY [facility] ORS    PELVISCOPY  9/16/2010    Performed by LALY AFLLON at SURGERY [facility] ORS    ABDOMINAL HYSTERECTOMY TOTAL  2006    fibroid removal      Family History   Problem Relation Age of Onset    Diabetes Mother         diet controlled    " Hypertension Father     Heart Disease Father         MI age 61    Cancer Father         prostate ca    Cancer Sister         ovarian lesion    Heart Disease Brother         blood clot heart     Social History     Socioeconomic History    Marital status:      Spouse name: Federico    Number of children: Not on file    Years of education: Not on file    Highest education level: Bachelor's degree (e.g., BA, AB, BS)   Occupational History    Occupation: ER      Employer: [employer]   Tobacco Use    Smoking status: Never    Smokeless tobacco: Never   Vaping Use    Vaping status: Never Used   Substance and Sexual Activity    Alcohol use: Not Currently     Comment: OCC (Holidays)    Drug use: No    Sexual activity: Yes     Partners: Male     Birth control/protection: Surgical   Other Topics Concern    Not on file   Social History Narrative    Not on file     Social Drivers of Health     Financial Resource Strain: Low Risk  (9/24/2024)    Overall Financial Resource Strain (CARDIA)     Difficulty of Paying Living Expenses: Not hard at all   Food Insecurity: No Food Insecurity (9/24/2024)    Hunger Vital Sign     Worried About Running Out of Food in the Last Year: Never true     Ran Out of Food in the Last Year: Never true   Transportation Needs: No Transportation Needs (9/24/2024)    PRAPARE - Transportation     Lack of Transportation (Medical): No     Lack of Transportation (Non-Medical): No   Physical Activity: Insufficiently Active (9/24/2024)    Exercise Vital Sign     Days of Exercise per Week: 4 days     Minutes of Exercise per Session: 30 min   Stress: No Stress Concern Present (9/24/2024)    [place] [employer] - Occupational Stress Questionnaire     Feeling of Stress : Only a little   Social Connections: Moderately Integrated (9/24/2024)    Social Connection and Isolation Panel [NHANES]     Frequency of Communication with Friends and Family: Once a week     Frequency of Social Gatherings with  Friends and Family: Once a week     Attends Bahai Services: More than 4 times per year     Active Member of Clubs or Organizations: Yes     Attends Club or Organization Meetings: More than 4 times per year     Marital Status:    Intimate Partner Violence: Not on file   Housing Stability: Unknown (9/24/2024)    Housing Stability Vital Sign     Unable to Pay for Housing in the Last Year: No     Number of Times Moved in the Last Year: Not on file     Homeless in the Last Year: No     Current Outpatient Medications   Medication    Zoster Vac Recomb Adjuvanted (SHINGRIX) 50 MCG/0.5ML Recon Susp    Semaglutide (WEGOVY) 0.25 MG/0.5ML Solution Auto-injector Pen-injector    Magnesium 100 MG Tab    atorvastatin (LIPITOR) 40 MG Tab    losartan (COZAAR) 50 MG Tab    alendronate (FOSAMAX) 70 MG Tab    aspirin 81 MG EC tablet    Cholecalciferol (VITAMIN D) 2000 UNIT Tab    omeprazole (PRILOSEC) 20 MG delayed-release capsule     Current Facility-Administered Medications   Medication Dose    onabotulinum toxin type A (Botox) injection 100 Units  100 Units     Allergies   Allergen Reactions    Pork Allergy Rash and Itching     itching             DATA / RESULTS     MRI C-spine 3/2023:  1.  Mild degenerative disease in the cervical spine as described above.  2.  There are multiple enlarged bilateral jugulodigastric and submandibular lymph nodes. The right submandibular lymph node measures an approximately 1.3 cm. This lymph nodes likely represents reactive lymphadenopathy. However, the possibility of   pathological lymphadenopathy cannot be excluded. Please correlate with the clinical examination.     MRI T-spine 3/2023  MRI of the thoracic spine without and with contrast within normal limits except for small central disc protrusion at T6-7 without spinal or neural foraminal stenosis..      Echocardiogram 2/2024:  Normal left ventricular systolic function.  Grade II diastolic dysfunction.  Moderately dilated left  atrium.  Evidence of early (0-5 beats) right to left shunt suggestive of intracardiac shunt (ASD or PFO).     US Venous Leg 2/2024:   No acute thrombosis is identified.      MRI Brain 2/2024: reviewed and agree with report  1.  No acute abnormality.  2.  Mild chronic microvascular ischemic disease.  3.  Mild tonsillar ectopia.  4.  There has been no significant interval change.     CTA Neck 2/2024:  CT angiogram of the neck within normal limits.      CTA Head 2/2024:  1.  No evidence of intracranial vascular occlusion.   2.  Possible tiny 1 mm aneurysm emanating from the right middle cerebral artery distribution from an M2 branch.    MRI Lumbar 6/2024  1.  Mild discal and endplate degenerative changes at the L5-S1 level.   2.  Minimal annular bulging at the L5-S1 level which extends into the inferior aspects of the neural foramina bilaterally.    25-Hydroxy   Vitamin D 25   Date Value Ref Range Status   03/07/2025 38 30 - 100 ng/mL Final     Comment:     Adult Ranges:   <20 ng/mL - Deficiency  20-29 ng/mL - Insufficiency   ng/mL - Sufficiency  Electrochemiluminescence binding assay performed using Roche kelton e  immunoassay analyzer.  The [employer] Vitamin D total II assay is intended for  the quantitative determination of total 25 hydroxyvitamin D in human serum  and plasma. This assay is to be used as an aid in the assessment of vitamin  D sufficiency in adults.       Vitamin B12 -True Cobalamin   Date Value Ref Range Status   02/21/2024 569 211 - 911 pg/mL Final     TSH   Date Value Ref Range Status   02/21/2024 1.270 0.380 - 5.330 uIU/mL Final     Comment:     The 2011 American Thyroid Association (JUANA) guidelines  recommended that the interpretation of thyroid function in  pregnancy be based on trimester specific reference ranges.    1st Trimester  0.100-2.500 mIU/L  2nd Trimester  0.200-3.000 mIU/L  3rd Trimester  0.300-3.500 mIU/L    These established reference ranges have not been validated  at [facility].       Glycohemoglobin   Date Value Ref Range Status   02/11/2019 5.2 4.0 - 5.6 % Final     LDL   Date Value Ref Range Status   05/30/2024 51 <100 mg/dL Final                OBJECTIVE      There were no vitals filed for this visit.        Physical Exam  Left obicularis oculi, zygomaticus, and risorius spasms          PROCEDURE       Botulinum Toxin Administration  Diagnosis: hemifacial spasm  Procedure Date: 03/14/25    INFORMED CONSENT   The risks and benefits of the procedure were explained to the patient, and all questions were answered. Adverse effects from toxin injection include but are not limited to: excessive weakness, infection, breathing and/or swallowing difficulty.  Common adverse effects from the injection itself are pain and bruising. The patient demonstrated good understanding of risks and benefits and consents to botulinum toxin injection.     Botox 100 U were diluted in 1 ml of saline  30 gauge needles     Muscle Right Left Total # Units   Orbicularis oculi   2U x7  *diagram below 14   Pre-tarsal plate         Frontalis         Procerus - -              Nasalis         Zygomaticus   2U 2   Risorius 1U   For symmetry 2U 3   Mentalis         Platysma            Physical Exam  Eyes:           Total units used: 19U  Total units wasted: 81U    Patient tolerated the procedure well, no complications.

## 2025-08-05 ENCOUNTER — APPOINTMENT (OUTPATIENT)
Dept: URBAN - METROPOLITAN AREA CLINIC 22 | Facility: CLINIC | Age: 55
Setting detail: DERMATOLOGY
End: 2025-08-05

## 2025-08-05 DIAGNOSIS — D22 MELANOCYTIC NEVI: ICD-10-CM

## 2025-08-05 DIAGNOSIS — Z71.89 OTHER SPECIFIED COUNSELING: ICD-10-CM

## 2025-08-05 DIAGNOSIS — L259 CONTACT DERMATITIS AND OTHER ECZEMA, UNSPECIFIED CAUSE: ICD-10-CM | Status: INADEQUATELY CONTROLLED

## 2025-08-05 DIAGNOSIS — L81.4 OTHER MELANIN HYPERPIGMENTATION: ICD-10-CM

## 2025-08-05 DIAGNOSIS — L82.1 OTHER SEBORRHEIC KERATOSIS: ICD-10-CM

## 2025-08-05 DIAGNOSIS — D18.0 HEMANGIOMA: ICD-10-CM

## 2025-08-05 PROBLEM — D18.01 HEMANGIOMA OF SKIN AND SUBCUTANEOUS TISSUE: Status: ACTIVE | Noted: 2025-08-05

## 2025-08-05 PROBLEM — D48.5 NEOPLASM OF UNCERTAIN BEHAVIOR OF SKIN: Status: ACTIVE | Noted: 2025-08-05

## 2025-08-05 PROBLEM — D22.5 MELANOCYTIC NEVI OF TRUNK: Status: ACTIVE | Noted: 2025-08-05

## 2025-08-05 PROBLEM — D23.39 OTHER BENIGN NEOPLASM OF SKIN OF OTHER PARTS OF FACE: Status: ACTIVE | Noted: 2025-08-05

## 2025-08-05 PROBLEM — L30.9 DERMATITIS, UNSPECIFIED: Status: ACTIVE | Noted: 2025-08-05

## 2025-08-05 PROCEDURE — ? COUNSELING

## 2025-08-05 PROCEDURE — ? PRESCRIPTION MEDICATION MANAGEMENT

## 2025-08-05 PROCEDURE — ? ADDITIONAL NOTES

## 2025-08-05 PROCEDURE — ? BIOPSY BY SHAVE METHOD

## 2025-08-05 PROCEDURE — ? SUNSCREEN RECOMMENDATIONS

## 2025-08-05 ASSESSMENT — LOCATION SIMPLE DESCRIPTION DERM
LOCATION SIMPLE: RIGHT FOREARM
LOCATION SIMPLE: RIGHT UPPER BACK
LOCATION SIMPLE: LEFT UPPER BACK
LOCATION SIMPLE: LEFT FOREARM
LOCATION SIMPLE: ABDOMEN

## 2025-08-05 ASSESSMENT — LOCATION DETAILED DESCRIPTION DERM
LOCATION DETAILED: RIGHT DISTAL DORSAL FOREARM
LOCATION DETAILED: RIGHT MID-UPPER BACK
LOCATION DETAILED: LEFT INFERIOR UPPER BACK
LOCATION DETAILED: LEFT LATERAL ABDOMEN
LOCATION DETAILED: LEFT PROXIMAL DORSAL FOREARM

## 2025-08-05 ASSESSMENT — LOCATION ZONE DERM
LOCATION ZONE: TRUNK
LOCATION ZONE: ARM